# Patient Record
Sex: FEMALE | Race: WHITE | Employment: FULL TIME | URBAN - METROPOLITAN AREA
[De-identification: names, ages, dates, MRNs, and addresses within clinical notes are randomized per-mention and may not be internally consistent; named-entity substitution may affect disease eponyms.]

---

## 2017-01-18 ENCOUNTER — HOSPITAL ENCOUNTER (OUTPATIENT)
Dept: MAMMOGRAPHY | Age: 62
Discharge: HOME | End: 2017-01-18
Attending: NURSE PRACTITIONER
Payer: COMMERCIAL

## 2017-01-18 DIAGNOSIS — Z12.39 BREAST CANCER SCREENING: ICD-10-CM

## 2017-01-18 DIAGNOSIS — N63.10 MASS OF RIGHT BREAST: ICD-10-CM

## 2017-01-18 PROCEDURE — 76642 ULTRASOUND BREAST LIMITED: CPT

## 2017-01-18 PROCEDURE — 77066 DX MAMMO INCL CAD BI: CPT

## 2017-02-07 ENCOUNTER — OFFICE VISIT (OUTPATIENT)
Dept: INTERNAL MEDICINE CLINIC | Age: 62
End: 2017-02-07

## 2017-02-07 VITALS
DIASTOLIC BLOOD PRESSURE: 69 MMHG | OXYGEN SATURATION: 94 % | SYSTOLIC BLOOD PRESSURE: 140 MMHG | TEMPERATURE: 96.1 F | HEIGHT: 60 IN | HEART RATE: 90 BPM | RESPIRATION RATE: 19 BRPM | WEIGHT: 293 LBS | BODY MASS INDEX: 57.52 KG/M2

## 2017-02-07 DIAGNOSIS — J44.9 CHRONIC OBSTRUCTIVE PULMONARY DISEASE, UNSPECIFIED COPD TYPE (HCC): ICD-10-CM

## 2017-02-07 DIAGNOSIS — L71.9 ROSACEA: ICD-10-CM

## 2017-02-07 DIAGNOSIS — E66.01 MORBID OBESITY, UNSPECIFIED OBESITY TYPE (HCC): ICD-10-CM

## 2017-02-07 DIAGNOSIS — F17.200 TOBACCO USE DISORDER: ICD-10-CM

## 2017-02-07 DIAGNOSIS — E78.00 PURE HYPERCHOLESTEROLEMIA: Chronic | ICD-10-CM

## 2017-02-07 DIAGNOSIS — G47.33 OSA (OBSTRUCTIVE SLEEP APNEA): ICD-10-CM

## 2017-02-07 DIAGNOSIS — I10 ESSENTIAL HYPERTENSION: Primary | Chronic | ICD-10-CM

## 2017-02-07 RX ORDER — TRIAMTERENE/HYDROCHLOROTHIAZID 37.5-25 MG
TABLET ORAL
Qty: 30 TAB | Refills: 5 | Status: SHIPPED | OUTPATIENT
Start: 2017-02-07 | End: 2017-09-05 | Stop reason: SDUPTHER

## 2017-02-07 RX ORDER — LOSARTAN POTASSIUM 50 MG/1
TABLET ORAL
Qty: 30 TAB | Refills: 5 | Status: SHIPPED | OUTPATIENT
Start: 2017-02-07 | End: 2017-09-05 | Stop reason: SDUPTHER

## 2017-02-07 RX ORDER — MONTELUKAST SODIUM 10 MG/1
TABLET ORAL
Qty: 30 TAB | Refills: 5 | Status: SHIPPED | OUTPATIENT
Start: 2017-02-07 | End: 2017-09-05 | Stop reason: SDUPTHER

## 2017-02-07 RX ORDER — METRONIDAZOLE 10 MG/G
GEL TOPICAL DAILY
Qty: 45 G | Refills: 5 | Status: SHIPPED | OUTPATIENT
Start: 2017-02-07 | End: 2018-12-05 | Stop reason: SDUPTHER

## 2017-02-07 RX ORDER — LOVASTATIN 40 MG/1
TABLET ORAL
Qty: 30 TAB | Refills: 5 | Status: SHIPPED | OUTPATIENT
Start: 2017-02-07 | End: 2017-09-05 | Stop reason: SDUPTHER

## 2017-02-07 NOTE — PROGRESS NOTES
Reviewed record in preparation for visit and have obtained necessary documentation. Identified pt with two pt identifiers(name and ). Health Maintenance Due   Topic    Hepatitis C Screening     Pneumococcal 19-64 Medium Risk (1 of 1 - PPSV23)    FOBT Q 1 YEAR AGE 54-65     INFLUENZA AGE 9 TO ADULT          Chief Complaint   Patient presents with    Hypertension    Cholesterol Problem    Follow Up Chronic Condition          Learning Assessment:  :     Learning Assessment 2014   PRIMARY LEARNER Patient   HIGHEST LEVEL OF EDUCATION - PRIMARY LEARNER  4 YEARS OF COLLEGE   BARRIERS PRIMARY LEARNER NONE   PRIMARY LANGUAGE ENGLISH   LEARNER PREFERENCE PRIMARY LISTENING   ANSWERED BY patient   RELATIONSHIP SELF       Depression Screening:  :     PHQ 2 / 9, over the last two weeks 2017   Little interest or pleasure in doing things Not at all   Feeling down, depressed or hopeless Not at all   Total Score PHQ 2 0       Fall Risk Assessment:  :     No flowsheet data found. Abuse Screening:  :     No flowsheet data found. Coordination of Care Questionnaire:  :     1) Have you been to an emergency room, urgent care clinic since your last visit? yes   Hospitalized since your last visit? no             2) Have you seen or consulted any other health care providers outside of [address] [place] [address] since your last visit? no  (Include any pap smears or colon screenings in this section.)    3) Do you have an Advance Directive on file? yes    4) Are you interested in receiving information on Advance Directives? NO      Patient is accompanied by patient I have received verbal consent from Juan Diego Coughlin to discuss any/all medical information while they are present in the room.

## 2017-02-07 NOTE — PROGRESS NOTES
HISTORY OF PRESENT ILLNESS  Juan Diego Coughlin is a 63 y.o. female. Pt. comes in after over a year for f/u. Has multiple medical problems. Recent abnormal mammogram but benign findings. Had umbilical hernia repair in 6/16. Has chronic redness on face and neck area. Worse in sun. No itching. Reports compliance with medications but not her diet closely. Med list and most recent labs/studies reviewed with pt. Not very active physically. Has gained more. Plans to attend bariatric surgery seminar. Her asthma/COPD has been stable. Still smoking. Has ADORE but unable to use her CPAP. Sleeps in recliner. Due for labs. Needs refills. Hypertension    Associated symptoms include shortness of breath (DANIEL). Pertinent negatives include no chest pain, no blurred vision, no headaches and no dizziness. Cholesterol Problem   Associated symptoms include shortness of breath (DANIEL). Pertinent negatives include no chest pain, no abdominal pain and no headaches. Follow Up Chronic Condition   Associated symptoms include shortness of breath (DANIEL). Pertinent negatives include no chest pain, no abdominal pain and no headaches. Review of Systems   Constitutional: Negative. Negative for weight loss. Eyes: Negative for blurred vision. Respiratory: Positive for shortness of breath (DANIEL). Negative for sputum production and wheezing. Cardiovascular: Negative for chest pain and leg swelling. Gastrointestinal: Negative for abdominal pain. Genitourinary: Negative for dysuria. Musculoskeletal: Positive for joint pain. Negative for back pain and falls. Skin: Negative for rash. Neurological: Negative for dizziness, sensory change, focal weakness and headaches. Psychiatric/Behavioral: Negative for depression. The patient is not nervous/anxious and does not have insomnia. All other systems reviewed and are negative. Physical Exam   Constitutional: She is oriented to person, place, and time.  She appears well-developed and well-nourished. No distress. Morbidly obese pleasant     HENT:   Head: Normocephalic and atraumatic. Mouth/Throat: Oropharynx is clear and moist.   Thick tissue in back of throat   Large tonsils     Eyes: Conjunctivae are normal. No scleral icterus. Neck: Normal range of motion. Neck supple. No JVD present. No thyromegaly present. Cardiovascular: Normal rate, regular rhythm, normal heart sounds and intact distal pulses. No murmur heard. Pulmonary/Chest: Effort normal and breath sounds normal. No respiratory distress. She has no wheezes. She has no rales. Abdominal: Soft. Bowel sounds are normal. She exhibits no distension. There is no tenderness. obese   Musculoskeletal: She exhibits no edema or tenderness. djd   Lymphadenopathy:     She has no cervical adenopathy. Neurological: She is alert and oriented to person, place, and time. Coordination normal.   Skin: Skin is warm and dry. No rash noted. There is erythema (facial, chronic). Psychiatric: She has a normal mood and affect. Her behavior is normal.   Nursing note and vitals reviewed. ASSESSMENT and PLAN  Brendan Murphy was seen today for hypertension, cholesterol problem and follow up chronic condition. Diagnoses and all orders for this visit:    Essential hypertension  -     METABOLIC PANEL, BASIC    Pure hypercholesterolemia  -     ALT  -     AST  -     LIPID PANEL  -     METABOLIC PANEL, BASIC  -     lovastatin (MEVACOR) 40 mg tablet; TAKE 1 TABLET BY MOUTH NIGHTLY    Morbid obesity, unspecified obesity type (HCC)    ADORE (obstructive sleep apnea)    Tobacco use disorder    Chronic obstructive pulmonary disease, unspecified COPD type (HCC)    Rosacea    Other orders  -     metroNIDAZOLE (METROGEL) 1 % topical gel; Apply  to affected area daily.  Use a thin layer to affected areas after washing  -     montelukast (SINGULAIR) 10 mg tablet; TAKE 1 TABLET BY MOUTH EVERY DAY  -     losartan (COZAAR) 50 mg tablet; TAKE 1 TABLET BY MOUTH DAILY.  -     triamterene-hydroCHLOROthiazide (MAXZIDE) 37.5-25 mg per tablet; TAKE 1 TABLET BY MOUTH EVERY DAY      Follow-up Disposition:  Return in about 6 months (around 8/7/2017). lab results and schedule of future lab studies reviewed with patient  reviewed diet, exercise and weight control  cardiovascular risk and specific lipid/LDL goals reviewed  reviewed medications and side effects in detail   F/u with other MD's as scheduled  Explained that her main issue is her wt. Discussed possible future consequences in detail. Bariatric surgery is probably the only effective way to lose this much wt.

## 2017-02-07 NOTE — MR AVS SNAPSHOT
Visit Information Date & Time Provider Department Dept. Phone Encounter #  
 2/7/2017 10:00 AM Esthela Guerra, [address] [facility] Internal Medicine [phone] [id number] Follow-up Instructions Return in about 6 months (around 8/7/2017). Upcoming Health Maintenance Date Due Hepatitis C Screening 1955 Pneumococcal 19-64 Medium Risk (1 of 1 - PPSV23) 6/22/1976 FOBT Q 1 YEAR AGE 50-75 6/22/2007 INFLUENZA AGE 9 TO ADULT 8/1/2016 PAP AKA CERVICAL CYTOLOGY 12/17/2017 BREAST CANCER SCRN MAMMOGRAM 1/18/2019 DTaP/Tdap/Td series (2 - Td) 12/17/2024 Allergies as of 2/7/2017  Review Complete On: 2/7/2017 By: Esthela Guerra, DO Severity Noted Reaction Type Reactions Codeine High 07/23/2010   Intolerance Shortness of Breath, Swelling Pcn [Penicillins] High 07/23/2010   Intolerance Shortness of Breath, Swelling E-mycin E Medium 07/23/2010   Intolerance Hives, Nausea and Vomiting Scopolamine Medium 12/05/2012    Swelling Adhesive Tape-silicones  74/28/5713    Rash, Swelling Current Immunizations  Reviewed on 2/7/2017 Name Date Influenza Vaccine 10/20/2016, 10/15/2014, 10/21/2013 Influenza Vaccine Split 9/5/2011 Pneumococcal Vaccine (Unspecified Type)  Deferred (Patient Refused) Tdap 12/17/2014 Reviewed by Tiffanie Villafana on 2/7/2017 at 10:22 AM  
You Were Diagnosed With   
  
 Codes Comments Essential hypertension    -  Primary ICD-10-CM: I10 
ICD-9-CM: 401.9 Pure hypercholesterolemia     ICD-10-CM: E78.00 ICD-9-CM: 272.0 Morbid obesity, unspecified obesity type ([facility]     ICD-10-CM: E66.01 
ICD-9-CM: 278.01   
 ADORE (obstructive sleep apnea)     ICD-10-CM: G47.33 
ICD-9-CM: 327.23 Tobacco use disorder     ICD-10-CM: F17.200 ICD-9-CM: 305.1 Chronic obstructive pulmonary disease, unspecified COPD type ([facility]     ICD-10-CM: J44.9 ICD-9-CM: 663 Rosacea     ICD-10-CM: L71.9 ICD-9-CM: 695.3 Vitals BP Pulse Temp Resp Height(growth percentile) Weight(growth percentile) 140/69 (BP 1 Location: Right arm, BP Patient Position: Sitting) 90 96.1 °F (35.6 °C) (Oral) 19 5' (1.524 m) 303 lb (137.4 kg) SpO2 BMI OB Status Smoking Status 94% 59.18 kg/m2 Hysterectomy Current Every Day Smoker BMI and BSA Data Body Mass Index Body Surface Area  
 59.18 kg/m 2 2.41 m 2 Preferred Pharmacy Pharmacy Name Phone [id number] [address] [phone] Your Updated Medication List  
  
   
This list is accurate as of: 2/7/17 10:39 AM.  Always use your most recent med list.  
  
  
  
  
 aspirin 81 mg chewable tablet Take 81 mg by mouth daily. clobetasol 0.05 % topical cream  
Commonly known as:  Marcus Davenport Apply  to affected area two (2) times a day. ipratropium-albuterol  mcg/actuation inhaler Commonly known as:  COMBIVENT Take 2 Puffs by inhalation every six (6) hours as needed for Wheezing. losartan 50 mg tablet Commonly known as:  COZAAR  
TAKE 1 TABLET BY MOUTH DAILY. lovastatin 40 mg tablet Commonly known as:  MEVACOR  
TAKE 1 TABLET BY MOUTH NIGHTLY  
  
 metroNIDAZOLE 1 % topical gel Commonly known as:  Mango Turner Apply  to affected area daily. Use a thin layer to affected areas after washing  
  
 montelukast 10 mg tablet Commonly known as:  SINGULAIR  
TAKE 1 TABLET BY MOUTH EVERY DAY  
  
 triamterene-hydroCHLOROthiazide 37.5-25 mg per tablet Commonly known as:  Cleotis Blare TAKE 1 TABLET BY MOUTH EVERY DAY  
  
 VITAMIN D3 PO Take 1,000 mg by mouth daily. Prescriptions Sent to Pharmacy Refills  
 metroNIDAZOLE (METROGEL) 1 % topical gel 5 Sig: Apply  to affected area daily. Use a thin layer to affected areas after washing Class: Normal  
 Pharmacy: [facility]/pharmacy #[id number] Ph #: [phone] Route: Topical  
 lovastatin (MEVACOR) 40 mg tablet 5 Sig: TAKE 1 TABLET BY MOUTH NIGHTLY Class: Normal  
 [id number] [address] Ph #: [phone]  
 montelukast (SINGULAIR) 10 mg tablet 5 Sig: TAKE 1 TABLET BY MOUTH EVERY DAY Class: Normal  
 [id number] [place] [address] Ph #: [phone]  
 losartan (COZAAR) 50 mg tablet 5 Sig: TAKE 1 TABLET BY MOUTH DAILY. Class: Normal  
 [id number] [address] Ph #: [phone]  
 triamterene-hydroCHLOROthiazide (MAXZIDE) 37.5-25 mg per tablet 5 Sig: TAKE 1 TABLET BY MOUTH EVERY DAY Class: Normal  
 [id number] [address] Ph #: [phone] We Performed the Following ALT [id number] CPT(R)] AST [id number] CPT(R)] LIPID PANEL [14832 CPT(R)] METABOLIC PANEL, BASIC [54475 CPT(R)] Follow-up Instructions Return in about 6 months (around 8/7/2017). Introducing [facility]! Dear Lauren White: Thank you for requesting a [employer] account. Our records indicate that you already have an active [employer] account. You can access your account anytime at [email or web address] Did you know that you can access your hospital and ER discharge instructions at any time in [employer]? You can also review all of your test results from your hospital stay or ER visit. Additional Information If you have questions, please visit the Frequently Asked Questions section of the [employer] website at [email or web address] Remember, [employer] is NOT to be used for urgent needs. For medical emergencies, dial 911. Now available from your iPhone and Android! Please provide this summary of care documentation to your next provider. Your primary care clinician is listed as Juan José Chavez. If you have any questions after today's visit, please call [phone].

## 2017-02-07 NOTE — LETTER
2/15/2017 12:18 PM 
 
Ms. Francine Alonso [facility] [address] Dear Kelvin Cordoba: 
 
Please find your most recent results below. Resulted Orders ALT Result Value Ref Range ALT (SGPT) 21 0 - 32 IU/L Narrative Performed at:  [address]  [id number] : Syed Warner MD, Phone:  [phone] AST Result Value Ref Range AST (SGOT) 20 0 - 40 IU/L Narrative Performed at:  [address]  [id number] : Syed Warner MD, Phone:  [phone] LIPID PANEL Result Value Ref Range Cholesterol, total 134 100 - 199 mg/dL Triglyceride 104 0 - 149 mg/dL HDL Cholesterol 46 >39 mg/dL VLDL, calculated 21 5 - 40 mg/dL LDL, calculated 67 0 - 99 mg/dL Narrative Performed at:  [address]  [id number] : Syed Warner MD, Phone:  [phone] METABOLIC PANEL, BASIC Result Value Ref Range Glucose 95 65 - 99 mg/dL BUN 21 6 - 24 mg/dL Creatinine 0.87 0.57 - 1.00 mg/dL GFR est non-AA 73 >59 mL/min/1.73 GFR est AA 84 >59 mL/min/1.73  
 BUN/Creatinine ratio 24 (H) 9 - 23 Sodium 141 134 - 144 mmol/L Potassium 3.8 3.5 - 5.2 mmol/L Chloride 99 96 - 106 mmol/L  
 CO2 28 18 - 29 mmol/L Calcium 10.0 8.7 - 10.2 mg/dL Narrative Performed at:  [address]  [id number] : Syed Warner MD, Phone:  [phone] CVD REPORT Result Value Ref Range INTERPRETATION Note Comment:  
   Supplement report is available. Narrative Performed at:  [facility] [place] [facility] [address] [place] [address]  [id number] : Feliz Velasco PhD, Phone:  [phone] RECOMMENDATIONS: 
 
stable Please call me if you have any questions: [phone] Sincerely, 
 
 
 Dre Yanez, DO

## 2017-02-08 LAB
ALT SERPL-CCNC: 21 IU/L (ref 0–32)
AST SERPL-CCNC: 20 IU/L (ref 0–40)
BUN SERPL-MCNC: 21 MG/DL (ref 6–24)
BUN/CREAT SERPL: 24 (ref 9–23)
CALCIUM SERPL-MCNC: 10 MG/DL (ref 8.7–10.2)
CHLORIDE SERPL-SCNC: 99 MMOL/L (ref 96–106)
CHOLEST SERPL-MCNC: 134 MG/DL (ref 100–199)
CO2 SERPL-SCNC: 28 MMOL/L (ref 18–29)
CREAT SERPL-MCNC: 0.87 MG/DL (ref 0.57–1)
GLUCOSE SERPL-MCNC: 95 MG/DL (ref 65–99)
HDLC SERPL-MCNC: 46 MG/DL
INTERPRETATION, 910389: NORMAL
LDLC SERPL CALC-MCNC: 67 MG/DL (ref 0–99)
POTASSIUM SERPL-SCNC: 3.8 MMOL/L (ref 3.5–5.2)
SODIUM SERPL-SCNC: 141 MMOL/L (ref 134–144)
TRIGL SERPL-MCNC: 104 MG/DL (ref 0–149)
VLDLC SERPL CALC-MCNC: 21 MG/DL (ref 5–40)

## 2017-04-08 DIAGNOSIS — J45.909 UNCOMPLICATED ASTHMA, UNSPECIFIED ASTHMA SEVERITY: ICD-10-CM

## 2017-04-08 DIAGNOSIS — I10 ESSENTIAL HYPERTENSION: ICD-10-CM

## 2017-04-08 RX ORDER — LOSARTAN POTASSIUM 50 MG/1
TABLET ORAL
Qty: 30 TAB | Refills: 5 | Status: SHIPPED | OUTPATIENT
Start: 2017-04-08 | End: 2017-06-07 | Stop reason: SDUPTHER

## 2017-04-08 RX ORDER — MONTELUKAST SODIUM 10 MG/1
TABLET ORAL
Qty: 30 TAB | Refills: 5 | Status: SHIPPED | OUTPATIENT
Start: 2017-04-08 | End: 2017-06-07 | Stop reason: SDUPTHER

## 2017-04-08 RX ORDER — TRIAMTERENE/HYDROCHLOROTHIAZID 37.5-25 MG
TABLET ORAL
Qty: 30 TAB | Refills: 5 | Status: SHIPPED | OUTPATIENT
Start: 2017-04-08 | End: 2017-06-07 | Stop reason: SDUPTHER

## 2017-06-01 ENCOUNTER — HOSPITAL ENCOUNTER (EMERGENCY)
Age: 62
Discharge: HOME | End: 2017-06-01
Attending: FAMILY MEDICINE

## 2017-06-01 ENCOUNTER — APPOINTMENT (OUTPATIENT)
Dept: GENERAL RADIOLOGY | Age: 62
End: 2017-06-01
Attending: FAMILY MEDICINE

## 2017-06-01 VITALS
HEART RATE: 81 BPM | SYSTOLIC BLOOD PRESSURE: 192 MMHG | DIASTOLIC BLOOD PRESSURE: 90 MMHG | BODY MASS INDEX: 57.52 KG/M2 | TEMPERATURE: 98.1 F | WEIGHT: 293 LBS | OXYGEN SATURATION: 94 % | HEIGHT: 60 IN | RESPIRATION RATE: 20 BRPM

## 2017-06-01 DIAGNOSIS — J44.1 CHRONIC OBSTRUCTIVE PULMONARY DISEASE WITH ACUTE EXACERBATION (HCC): Primary | ICD-10-CM

## 2017-06-01 RX ORDER — BENZONATATE 200 MG/1
200 CAPSULE ORAL
Qty: 21 CAP | Refills: 0 | Status: SHIPPED | OUTPATIENT
Start: 2017-06-01 | End: 2017-06-07

## 2017-06-01 RX ORDER — IPRATROPIUM BROMIDE AND ALBUTEROL SULFATE 2.5; .5 MG/3ML; MG/3ML
3 SOLUTION RESPIRATORY (INHALATION)
Status: COMPLETED | OUTPATIENT
Start: 2017-06-01 | End: 2017-06-01

## 2017-06-01 RX ORDER — PREDNISONE 20 MG/1
TABLET ORAL
Qty: 21 TAB | Refills: 0 | Status: SHIPPED | OUTPATIENT
Start: 2017-06-01 | End: 2017-09-05 | Stop reason: ALTCHOICE

## 2017-06-01 RX ORDER — DOXYCYCLINE 100 MG/1
100 CAPSULE ORAL 2 TIMES DAILY
Qty: 20 CAP | Refills: 0 | Status: SHIPPED | OUTPATIENT
Start: 2017-06-01 | End: 2017-09-05 | Stop reason: ALTCHOICE

## 2017-06-01 RX ORDER — PROMETHAZINE HYDROCHLORIDE AND DEXTROMETHORPHAN HYDROBROMIDE 6.25; 15 MG/5ML; MG/5ML
5 SYRUP ORAL
Qty: 100 ML | Refills: 0 | Status: SHIPPED | OUTPATIENT
Start: 2017-06-01 | End: 2017-06-07

## 2017-06-01 RX ADMIN — IPRATROPIUM BROMIDE AND ALBUTEROL SULFATE 3 ML: 2.5; .5 SOLUTION RESPIRATORY (INHALATION) at 10:55

## 2017-06-01 NOTE — UC PROVIDER NOTE
Patient is a 63 y.o. female presenting with cough. The history is provided by the patient. Cough   This is a new problem. The current episode started more than 2 days ago. The problem occurs every few minutes. The problem has been rapidly worsening. The cough is non-productive. There has been no fever. Associated symptoms include chest pain (on coughing ), shortness of breath and wheezing. Pertinent negatives include no chills, no nausea and no vomiting. She has tried nothing for the symptoms. She is a smoker. Her past medical history is significant for bronchitis, pneumonia and COPD. Past Medical History:   Diagnosis Date    Angina decubitus ([facility]     Arthritis     KNEES    Asthma     COPD     Gallstones     H/O seasonal allergies     History of blood transfusion EARLY     [facility], NO REACTION    Hypercholesterolemia     Hypertension     IBS (irritable bowel syndrome)     Morbid obesity (HCC)     Nausea & vomiting     WITH ALL 3 SURGERIES    Obesity     Other ill-defined conditions(799.89)     High cholesterol    Psoriasis     Recurrent umbilical hernia 4323    Sleep apnea     DOESN'T USE CPAP; SLEEPS IN RECLINER FOR 2 YEARS    Unspecified adverse effect of anesthesia     SMALL AIRWAY, ENLARGED TONSILS    URI (upper respiratory infection) 2014        Past Surgical History:   Procedure Laterality Date    HX  SECTION  1987    HX CHOLECYSTECTOMY  2011    by Dr Bee Folds Right     2010 - Benign    HX DILATION AND CURETTAGE  10/2012    HX HEART CATHETERIZATION      HX HEENT      WISDOM TEETH EXTRACTION    HX HEENT Bilateral     TAN.  TEAR DUCT TUMOR REMOVAL    HX HEENT      ALL TEETH REMOVED    HX HERNIA REPAIR  9997    umbilical hernia repair by Dr Ambrose Aiken  12    ventral hernia repair by Dr Rupa Alexandra  16    Laparoscopic repair of recurrent incisional hernia with mesh by Dr Francheska Man  12/1/2012    Dr Salvador Pro Left 2/09   2 PROCEDURES    ORIF L FOOT, 2ND REPAIR    HX WISDOM TEETH EXTRACTION      KNEE ARTHROSCP HARV      KNEE SCOPE,MED OR LAT MENIS REPAIR Bilateral 1980    TAN 2 SEPARATE SURGERIES SAME YEAR    LAP,CHOLECYSTECTOMY  4/27/11         Family History   Problem Relation Age of Onset    Hypertension Mother     Post-op Nausea/Vomiting Mother     Stroke Mother     Post-op Nausea/Vomiting Daughter     Heart Disease Father     No Known Problems Sister     No Known Problems Sister     No Known Problems Sister         Social History     Social History    Marital status:      Spouse name: N/A    Number of children: N/A    Years of education: N/A     Occupational History    Not on file. Social History Main Topics    Smoking status: Current Every Day Smoker     Packs/day: 0.25     Years: 38.00     Types: Cigarettes    Smokeless tobacco: Never Used      Comment: DOWN TO 5 CIGARETTES PER DAY. SMOKED 2 PPD FOR 7 YEARS    Alcohol use No    Drug use: No    Sexual activity: Not on file     Other Topics Concern    Not on file     Social History Narrative                ALLERGIES: Codeine; Pcn [penicillins]; E-mycin e; Scopolamine; and Adhesive tape-silicones    Review of Systems   Constitutional: Negative for chills. Respiratory: Positive for cough, shortness of breath and wheezing. Cardiovascular: Positive for chest pain (on coughing ). Gastrointestinal: Negative for nausea and vomiting. All other systems reviewed and are negative. Vitals:    06/01/17 1032   BP: 192/90   Pulse: 81   Resp: 20   Temp: 98.1 °F (36.7 °C)   SpO2: 94%   Weight: 136.5 kg (301 lb)   Height: 5' (1.524 m)       Physical Exam   Constitutional: No distress.    HENT:   Right Ear: Tympanic membrane and ear canal normal.   Left Ear: Tympanic membrane and ear canal normal.   Nose: Nose normal.   Mouth/Throat: No oropharyngeal exudate, posterior oropharyngeal edema or posterior oropharyngeal erythema. Eyes: Conjunctivae are normal. Right eye exhibits no discharge. Left eye exhibits no discharge. Neck: Neck supple. Pulmonary/Chest: Effort normal. No respiratory distress. She has decreased breath sounds. She has wheezes. She has rhonchi. She has no rales. Lymphadenopathy:     She has no cervical adenopathy. Skin: No rash noted. Nursing note and vitals reviewed. MDM     Differential Diagnosis; Clinical Impression; Plan:     CLINICAL IMPRESSION:  Chronic obstructive pulmonary disease with acute exacerbation ([facility]  (primary encounter diagnosis)      DDX    Plan:    Doxy/tessalon and prednisone  Continue inhalers    CXR- no infiltrate  Amount and/or Complexity of Data Reviewed:   Tests in the radiology section of CPT®:  Ordered and reviewed   Review and summarize past medical records:  Yes  Risk of Significant Complications, Morbidity, and/or Mortality:   Presenting problems: Moderate  Diagnostic procedures: Moderate  Management options:   Moderate      Procedures

## 2017-06-01 NOTE — DISCHARGE INSTRUCTIONS
COPD Exacerbation Plan: Care Instructions  Your Care Instructions  If you have chronic obstructive pulmonary disease (COPD), your usual shortness of breath could suddenly get worse. You may start coughing more and have more mucus. This flare-up is called a COPD exacerbation (say \"fc-EAU-md-BAY-myriam\"). A lung infection or air pollution could set off an exacerbation. Sometimes it can happen after a quick change in temperature or being around chemicals. Work with your doctor to make a plan for dealing with an exacerbation. You can better manage it if you plan ahead. Follow-up care is a key part of your treatment and safety. Be sure to make and go to all appointments, and call your doctor if you are having problems. It's also a good idea to know your test results and keep a list of the medicines you take. How can you care for yourself at home? During an exacerbation  · Do not panic if you start to have one. Quick treatment at home may help you prevent serious breathing problems. If you have a COPD exacerbation plan that you developed with your doctor, follow it. · Take your medicines exactly as your doctor tells you. ¨ Use your inhaler as directed by your doctor. If your symptoms do not get better after you use your medicine, have someone take you to the emergency room. Call an ambulance if necessary. ¨ With inhaled medicines, a spacer or a nebulizer may help you get more medicine to your lungs. Ask your doctor or pharmacist how to use them properly. Practice using the spacer in front of a mirror before you have an exacerbation. This may help you get the medicine into your lungs quickly. ¨ If your doctor has given you steroid pills, take them as directed. ¨ Your doctor may have given you a prescription for antibiotics, which you can fill if you need it. ¨ Talk to your doctor if you have any problems with your medicine. And call your doctor if you have to use your antibiotic or steroid pills.   Preventing an exacerbation  · Do not smoke. This is the most important step you can take to prevent more damage to your lungs and prevent problems. If you already smoke, it is never too late to stop. If you need help quitting, talk to your doctor about stop-smoking programs and medicines. These can increase your chances of quitting for good. · Take your daily medicines as prescribed. · Avoid colds and flu. ¨ Get a pneumococcal vaccine. ¨ Get a flu vaccine each year, as soon as it is available. Ask those you live or work with to do the same, so they will not get the flu and infect you. ¨ Try to stay away from people with colds or the flu. ¨ Wash your hands often. · Avoid secondhand smoke; air pollution; cold, dry air; hot, humid air; and high altitudes. Stay at home with your windows closed when air pollution is bad. · Learn breathing techniques for COPD, such as breathing through pursed lips. These techniques can help you breathe easier during an exacerbation. When should you call for help? Call 911 anytime you think you may need emergency care. For example, call if:  · You have severe trouble breathing. · You have severe chest pain. Call your doctor now or seek immediate medical care if:  · You have new or worse shortness of breath. · You develop new chest pain. · You are coughing more deeply or more often, especially if you notice more mucus or a change in the color of your mucus. · You cough up blood. · You have new or increased swelling in your legs or belly. · You have a fever. Watch closely for changes in your health, and be sure to contact your doctor if:  · You need to use your antibiotic or steroid pills. · Your symptoms are getting worse. Where can you learn more? Go to http://thien-homero.info/. Enter Z951 in the search box to learn more about \"COPD Exacerbation Plan: Care Instructions. \"  Current as of: July 21, 2016  Content Version: 11.2  © 0749-7263 [employer]. Care instructions adapted under license by [employer] (which disclaims liability or warranty for this information). If you have questions about a medical condition or this instruction, always ask your healthcare professional. [address] any warranty or liability for your use of this information.

## 2017-06-02 ENCOUNTER — PATIENT OUTREACH (OUTPATIENT)
Dept: OTHER | Age: 62
End: 2017-06-02

## 2017-06-02 NOTE — PROGRESS NOTES
Patient on report as with discharge from  visit 06/01 for COPD exacerbation. Initial attempt to contact patient for transitions of care. Left discreet message on voicemail with this CM contact information. Will attempt to contact again. 1:30 pm Ms. Kelly returned my call and left voice message while I was on another call. She shared that she has started her antibiotics and prednisone and is feeling better with productive cough instead of dry. She is planning to make apt with PCP, Dr. Pascale Crowley today. Follow up:  Tues 6/6        Prescriptions   Medication Sig Dispense Start Date End Date Auth. Provider   benzonatate (TESSALON) 200 mg capsule Take 1 Cap by mouth three (3) times daily as needed for Cough for up to 7 days. 21 Cap 6/1/2017 6/8/2017 Delfino Lopez MD   doxycycline (MONODOX) 100 mg capsule Take 1 Cap by mouth two (2) times a day. 20 Cap 6/1/2017  Delfino Lopez MD   predniSONE (DELTASONE) 20 mg tablet 3 tab once  x 3 days, 2 tab once x 3 days, 1 tab once x 3 days and 1/2 tab once  x 3 days - With Breakfast 21 Tab 6/1/2017  Delfino Lopez MD   promethazine-dextromethorphan (PROMETHAZINE-DM) 6.25-15 mg/5 mL syrup Take 5 mL by mouth nightly as needed for Cough.  100 mL 6/1/2017  Delfino Lopez MD   Follow-up Information   Follow up With Details Comments  [address], DO Schedule an appointment as soon as possible for a visit in 1 week If symptom not resolved [address] [facility]   [address] [facility]   [phone]

## 2017-06-05 ENCOUNTER — PATIENT OUTREACH (OUTPATIENT)
Dept: OTHER | Age: 62
End: 2017-06-05

## 2017-06-05 NOTE — PROGRESS NOTES
Patient on report as with discharge from  visit for COPD exacerbation. Attempted to contact patient for transitions of care update. Left discreet message on voicemail with this CM contact information. Will attempt to contact again.      Chart Review finds apt on 6/7 with NP.

## 2017-06-06 ENCOUNTER — PATIENT OUTREACH (OUTPATIENT)
Dept: OTHER | Age: 62
End: 2017-06-06

## 2017-06-06 NOTE — PROGRESS NOTES
SUKUMAR follow up. Patient on with UC/ED visit for COPD exacerbation. Chart review:  PCP appointment made for 06/07. Patient returned my call for transitions of care services. Ms. Salo Ovalle is feeling much better with antibiotics and steroids. She states, \"Smoking is down. \"   Apt for tomorrow with PCP made. She hopes to return to work this weekend. She is aware of her needs for lifestyle modification (smoking/ weight management mentioned) and will consider readiness to change. Introduced CCM program for continued support after SUKUMAR. Will follow for GISELA MONZON services. Next FU 2 weeks.

## 2017-06-07 ENCOUNTER — OFFICE VISIT (OUTPATIENT)
Dept: INTERNAL MEDICINE CLINIC | Age: 62
End: 2017-06-07

## 2017-06-07 VITALS
TEMPERATURE: 97.2 F | WEIGHT: 293 LBS | SYSTOLIC BLOOD PRESSURE: 144 MMHG | HEART RATE: 88 BPM | DIASTOLIC BLOOD PRESSURE: 71 MMHG | HEIGHT: 60 IN | OXYGEN SATURATION: 98 % | BODY MASS INDEX: 57.52 KG/M2 | RESPIRATION RATE: 14 BRPM

## 2017-06-07 DIAGNOSIS — J44.1 COPD EXACERBATION (HCC): Primary | ICD-10-CM

## 2017-06-07 DIAGNOSIS — Z12.11 COLON CANCER SCREENING: ICD-10-CM

## 2017-06-07 NOTE — PATIENT INSTRUCTIONS
COPD Exacerbation Plan: Care Instructions  Your Care Instructions  If you have chronic obstructive pulmonary disease (COPD), your usual shortness of breath could suddenly get worse. You may start coughing more and have more mucus. This flare-up is called a COPD exacerbation (say \"lp-LIR-df-BAY-myriam\"). A lung infection or air pollution could set off an exacerbation. Sometimes it can happen after a quick change in temperature or being around chemicals. Work with your doctor to make a plan for dealing with an exacerbation. You can better manage it if you plan ahead. Follow-up care is a key part of your treatment and safety. Be sure to make and go to all appointments, and call your doctor if you are having problems. It's also a good idea to know your test results and keep a list of the medicines you take. How can you care for yourself at home? During an exacerbation  · Do not panic if you start to have one. Quick treatment at home may help you prevent serious breathing problems. If you have a COPD exacerbation plan that you developed with your doctor, follow it. · Take your medicines exactly as your doctor tells you. ¨ Use your inhaler as directed by your doctor. If your symptoms do not get better after you use your medicine, have someone take you to the emergency room. Call an ambulance if necessary. ¨ With inhaled medicines, a spacer or a nebulizer may help you get more medicine to your lungs. Ask your doctor or pharmacist how to use them properly. Practice using the spacer in front of a mirror before you have an exacerbation. This may help you get the medicine into your lungs quickly. ¨ If your doctor has given you steroid pills, take them as directed. ¨ Your doctor may have given you a prescription for antibiotics, which you can fill if you need it. ¨ Talk to your doctor if you have any problems with your medicine. And call your doctor if you have to use your antibiotic or steroid pills.   Preventing an exacerbation  · Do not smoke. This is the most important step you can take to prevent more damage to your lungs and prevent problems. If you already smoke, it is never too late to stop. If you need help quitting, talk to your doctor about stop-smoking programs and medicines. These can increase your chances of quitting for good. · Take your daily medicines as prescribed. · Avoid colds and flu. ¨ Get a pneumococcal vaccine. ¨ Get a flu vaccine each year, as soon as it is available. Ask those you live or work with to do the same, so they will not get the flu and infect you. ¨ Try to stay away from people with colds or the flu. ¨ Wash your hands often. · Avoid secondhand smoke; air pollution; cold, dry air; hot, humid air; and high altitudes. Stay at home with your windows closed when air pollution is bad. · Learn breathing techniques for COPD, such as breathing through pursed lips. These techniques can help you breathe easier during an exacerbation. When should you call for help? Call 911 anytime you think you may need emergency care. For example, call if:  · You have severe trouble breathing. · You have severe chest pain. Call your doctor now or seek immediate medical care if:  · You have new or worse shortness of breath. · You develop new chest pain. · You are coughing more deeply or more often, especially if you notice more mucus or a change in the color of your mucus. · You cough up blood. · You have new or increased swelling in your legs or belly. · You have a fever. Watch closely for changes in your health, and be sure to contact your doctor if:  · You need to use your antibiotic or steroid pills. · Your symptoms are getting worse. Where can you learn more? Go to http://thien-homero.info/. Enter V739 in the search box to learn more about \"COPD Exacerbation Plan: Care Instructions. \"  Current as of: July 21, 2016  Content Version: 11.2  © 6964-3435 Healthwise, Incorporated. Care instructions adapted under license by [employer] (which disclaims liability or warranty for this information). If you have questions about a medical condition or this instruction, always ask your healthcare professional. [address] any warranty or liability for your use of this information.

## 2017-06-07 NOTE — PROGRESS NOTES
HISTORY OF PRESENT ILLNESS  Britney Baer is a 63 y.o. female. This is a patient of Dr. Laurie Welch who presents today for follow-up after urgent care visit. The patient presented to urgent care on 6/1/17 related to cough and congestion. She had some wheezing as well. She was diagnosed with COPD exacerbation and discharged with orders for doxycycline and prednisone taper. She states she is feeling better. She continue with productive cough, but this is improving. No wheezing or shortness of breath at this time. No chest pain. No fever or chills. Patient states she has been using cough drops PRN, which have helped. She would like to return to work on Friday. Patient has not had colonoscopy. She declines at today's visit but agrees to FIT test.  Visit Vitals    /71 (BP 1 Location: Left arm, BP Patient Position: Sitting)    Pulse 88    Temp 97.2 °F (36.2 °C) (Oral)    Resp 14    Ht 5' (1.524 m)    Wt 303 lb (137.4 kg)    SpO2 98%    BMI 59.18 kg/m2     HPI    Review of Systems   Constitutional: Negative for chills, fever and weight loss. HENT: Positive for congestion and sore throat. Respiratory: Positive for cough and sputum production. Negative for shortness of breath and wheezing. Cardiovascular: Negative for chest pain, palpitations and leg swelling. Gastrointestinal: Negative for abdominal pain, blood in stool, constipation and diarrhea. Genitourinary: Negative. Musculoskeletal: Negative. Skin: Negative. Neurological: Negative for dizziness and headaches. Endo/Heme/Allergies: Negative. Psychiatric/Behavioral: Negative. Physical Exam   Constitutional: She is oriented to person, place, and time. She appears well-developed and well-nourished. No distress. HENT:   Head: Normocephalic and atraumatic. Mild nasal congestion  OP with cobblestoning     Eyes: Conjunctivae are normal.   Cardiovascular: Normal rate and regular rhythm.     Pulmonary/Chest: Effort normal and breath sounds normal. No respiratory distress. She has no wheezes. She has no rales. Musculoskeletal: She exhibits no edema. Lymphadenopathy:     She has no cervical adenopathy. Neurological: She is alert and oriented to person, place, and time. Skin: Skin is warm and dry. Psychiatric: She has a normal mood and affect. Her behavior is normal.   Nursing note and vitals reviewed. ASSESSMENT and PLAN    ICD-10-CM ICD-9-CM    1. COPD exacerbation (HCC) J44.1 491.21 Complete doxycyline 100 mg po bid x 10 days, as ordered. Complete prednisone taper, as ordered. May continue Combivent PRN, as ordered. Patient encouraged to call or return to office if symptoms do not improve or worsen. Discussed importance of smoking cessation. Patient has currently decreased to 3-4 cigarettes daily. 2. Colon cancer screening Z12.11 V76.51 Will order  OCCULT BLOOD, IMMUNOASSAY (FIT)     Completed note for return to work, per request.  Lab results and schedule of future lab studies reviewed with patient  Reviewed diet, exercise and weight control  Very strongly urged to quit smoking to reduce cardiovascular risk  Reviewed medications and side effects in detail  Patient encouraged to call or return to office if symptoms do not improve or worsen. Reviewed plan of care with patient who acknowledges understanding and agrees. If experiencing severe shortness of breath or chest pain, present to the ER. Follow-up with Dr. Rima Parker in 2 months, as scheduled, or sooner as needed.

## 2017-06-07 NOTE — MR AVS SNAPSHOT
Visit Information Date & Time Provider Department Dept. Phone Encounter #  
 6/7/2017  9:45 AM Jacquelyn Dean, [address] [facility] Internal Medicine [phone] [id number] Your Appointments 8/7/2017 10:15 AM  
ACUTE CARE with Nandini Alba DO [facility] ([address]) Appt Note: fu [address] [place] [id number]  
[phone]  
  
   
 [address] [facility] [address] Upcoming Health Maintenance Date Due Hepatitis C Screening 1955 Pneumococcal 19-64 Medium Risk (1 of 1 - PPSV23) 6/22/1976 FOBT Q 1 YEAR AGE 50-75 6/22/2007 INFLUENZA AGE 9 TO ADULT 8/1/2017 PAP AKA CERVICAL CYTOLOGY 12/17/2017 BREAST CANCER SCRN MAMMOGRAM 1/18/2019 DTaP/Tdap/Td series (2 - Td) 12/17/2024 Allergies as of 6/7/2017  Review Complete On: 6/7/2017 By: Jacquelyn Dean NP Severity Noted Reaction Type Reactions Codeine High 07/23/2010   Intolerance Shortness of Breath, Swelling Pcn [Penicillins] High 07/23/2010   Intolerance Shortness of Breath, Swelling E-mycin E Medium 07/23/2010   Intolerance Hives, Nausea and Vomiting Scopolamine Medium 12/05/2012    Swelling Adhesive Tape-silicones  66/11/8958    Rash, Swelling Current Immunizations  Reviewed on 2/7/2017 Name Date Influenza Vaccine 10/20/2016, 10/15/2014, 10/21/2013 Influenza Vaccine Split 9/5/2011 Pneumococcal Vaccine (Unspecified Type)  Deferred (Patient Refused) Tdap 12/17/2014 Not reviewed this visit You Were Diagnosed With   
  
 Codes Comments COPD exacerbation ([facility] [address]    -  Primary ICD-10-CM: J44.1 ICD-9-CM: 491.21 Colon cancer screening     ICD-10-CM: Z12.11 ICD-9-CM: V76.51 Vitals BP Pulse Temp Resp Height(growth percentile) Weight(growth percentile) 144/71 (BP 1 Location: Left arm, BP Patient Position: Sitting) 88 97.2 °F (36.2 °C) (Oral) 14 5' (1.524 m) 303 lb (137.4 kg) SpO2 BMI OB Status Smoking Status 90% 59.18 kg/m2 Hysterectomy Current Every Day Smoker Vitals History BMI and BSA Data Body Mass Index Body Surface Area  
 59.18 kg/m 2 2.41 m 2 Preferred Pharmacy Pharmacy Name Phone [id number] Mckeon, [address] [place] [address] [phone] Your Updated Medication List  
  
   
This list is accurate as of: 6/7/17 10:15 AM.  Always use your most recent med list.  
  
  
  
  
 aspirin 81 mg chewable tablet Take 81 mg by mouth daily. clobetasol 0.05 % topical cream  
Commonly known as:  Jacquetta Laurel Apply  to affected area two (2) times a day. doxycycline 100 mg capsule Commonly known as:  Blanka Less Take 1 Cap by mouth two (2) times a day. ipratropium-albuterol  mcg/actuation inhaler Commonly known as:  COMBIVENT Take 2 Puffs by inhalation every six (6) hours as needed for Wheezing. losartan 50 mg tablet Commonly known as:  COZAAR  
TAKE 1 TABLET BY MOUTH DAILY. lovastatin 40 mg tablet Commonly known as:  MEVACOR  
TAKE 1 TABLET BY MOUTH NIGHTLY  
  
 metroNIDAZOLE 1 % topical gel Commonly known as:  Burtis Sidles Apply  to affected area daily. Use a thin layer to affected areas after washing  
  
 montelukast 10 mg tablet Commonly known as:  SINGULAIR  
TAKE 1 TABLET BY MOUTH EVERY DAY  
  
 predniSONE 20 mg tablet Commonly known as:  DELTASONE  
3 tab once  x 3 days, 2 tab once x 3 days, 1 tab once x 3 days and 1/2 tab once  x 3 days - With Breakfast  
  
 triamterene-hydroCHLOROthiazide 37.5-25 mg per tablet Commonly known as:  Rene Earing TAKE 1 TABLET BY MOUTH EVERY DAY  
  
 VITAMIN D3 PO Take 1,000 mg by mouth daily. We Performed the Following OCCULT BLOOD, IMMUNOASSAY (FIT) [id number] CPT(R)] Patient Instructions COPD Exacerbation Plan: Care Instructions Your Care Instructions If you have chronic obstructive pulmonary disease (COPD), your usual shortness of breath could suddenly get worse. You may start coughing more and have more mucus. This flare-up is called a COPD exacerbation (say \"fa-DMM-am-BAY-shun\"). A lung infection or air pollution could set off an exacerbation. Sometimes it can happen after a quick change in temperature or being around chemicals. Work with your doctor to make a plan for dealing with an exacerbation. You can better manage it if you plan ahead. Follow-up care is a key part of your treatment and safety. Be sure to make and go to all appointments, and call your doctor if you are having problems. It's also a good idea to know your test results and keep a list of the medicines you take. How can you care for yourself at home? During an exacerbation · Do not panic if you start to have one. Quick treatment at home may help you prevent serious breathing problems. If you have a COPD exacerbation plan that you developed with your doctor, follow it. · Take your medicines exactly as your doctor tells you. ¨ Use your inhaler as directed by your doctor. If your symptoms do not get better after you use your medicine, have someone take you to the emergency room. Call an ambulance if necessary. ¨ With inhaled medicines, a spacer or a nebulizer may help you get more medicine to your lungs. Ask your doctor or pharmacist how to use them properly. Practice using the spacer in front of a mirror before you have an exacerbation. This may help you get the medicine into your lungs quickly. ¨ If your doctor has given you steroid pills, take them as directed. ¨ Your doctor may have given you a prescription for antibiotics, which you can fill if you need it. ¨ Talk to your doctor if you have any problems with your medicine. And call your doctor if you have to use your antibiotic or steroid pills. Preventing an exacerbation · Do not smoke. This is the most important step you can take to prevent more damage to your lungs and prevent problems. If you already smoke, it is never too late to stop. If you need help quitting, talk to your doctor about stop-smoking programs and medicines. These can increase your chances of quitting for good. · Take your daily medicines as prescribed. · Avoid colds and flu. ¨ Get a pneumococcal vaccine. ¨ Get a flu vaccine each year, as soon as it is available. Ask those you live or work with to do the same, so they will not get the flu and infect you. ¨ Try to stay away from people with colds or the flu. ¨ Wash your hands often. · Avoid secondhand smoke; air pollution; cold, dry air; hot, humid air; and high altitudes. Stay at home with your windows closed when air pollution is bad. · Learn breathing techniques for COPD, such as breathing through pursed lips. These techniques can help you breathe easier during an exacerbation. When should you call for help? Call 911 anytime you think you may need emergency care. For example, call if: 
· You have severe trouble breathing. · You have severe chest pain. Call your doctor now or seek immediate medical care if: 
· You have new or worse shortness of breath. · You develop new chest pain. · You are coughing more deeply or more often, especially if you notice more mucus or a change in the color of your mucus. · You cough up blood. · You have new or increased swelling in your legs or belly. · You have a fever. Watch closely for changes in your health, and be sure to contact your doctor if: 
· You need to use your antibiotic or steroid pills. · Your symptoms are getting worse. Where can you learn more? Go to http://thien-homero.info/. Enter F901 in the search box to learn more about \"COPD Exacerbation Plan: Care Instructions. \" Current as of: July 21, 2016 Content Version: 11.2 © 1711-6358 Healthwise, Incorporated. Care instructions adapted under license by [employer] (which disclaims liability or warranty for this information). If you have questions about a medical condition or this instruction, always ask your healthcare professional. [address] any warranty or liability for your use of this information. Introducing [facility]! Dear Sawyer Randall: Thank you for requesting a [employer] account. Our records indicate that you already have an active [employer] account. You can access your account anytime at [email or web address] Did you know that you can access your hospital and ER discharge instructions at any time in [employer]? You can also review all of your test results from your hospital stay or ER visit. Additional Information If you have questions, please visit the Frequently Asked Questions section of the [employer] website at [email or web address] Remember, [employer] is NOT to be used for urgent needs. For medical emergencies, dial 911. Now available from your iPhone and Android! Please provide this summary of care documentation to your next provider. Your primary care clinician is listed as Cheryl Narvaez. If you have any questions after today's visit, please call [phone].

## 2017-06-07 NOTE — PROGRESS NOTES
1. Have you been to the ER, urgent care clinic since your last visit? Hospitalized since your last visit? yes- see below. 2. Have you seen or consulted any other health care providers outside of the [address] [place] [address] since your last visit? Include any pap smears or colon screening. No     Chief Complaint   Patient presents with    COPD     follow up from [facility] urgent care 6/1/2017 for COPD exarcebation.       Not fasting

## 2017-06-07 NOTE — LETTER
NOTIFICATION OF RETURN TO WORK / SCHOOL 
 
6/7/2017 10:12 AM 
 
Ms. Escamilla [address] [facility] [address] [address] [place] To Whom It May Concern: 
 
Brian Franklin was under the care of [address] [place] on 6/7/17. She will be able to return to work/school on 06/09/17 If there are questions or concerns please have the patient contact our office. Sincerely, Rosa Bravo NP

## 2017-07-03 ENCOUNTER — PATIENT OUTREACH (OUTPATIENT)
Dept: OTHER | Age: 62
End: 2017-07-03

## 2017-07-03 NOTE — PROGRESS NOTES
FU call for SUKUMAR from UC visit for bronchitis on 6/1. Previous SUKUMAR calls introduced continued CM coverage through [employer] benefits. Possible goals include weight management (BMI 59.18); Smoking cessation; Lifestyle management. Resolving current episode (Transitions of care complete). No further ED/UC or hospital admissions within 30 days post discharge. Patient attended follow-up appointments. Left discreet message on voicemail with this CM contact information. Will attempt to contact again to offer [address] [place] [address] Management services.

## 2017-07-03 NOTE — LETTER
7/3/2017 12:38 PM 
 
Ms. Francine Alonso [facility] [address] Dear Ms. Leticia, My name is Juliann Cade , Employee Care Manager for [employer] [facility] [employer] and I have been trying to reach you. The Employee Care  is a free-of-charge confidential service provided to our employees and their family members covered by the [employer]. The program will provide an employee and his/her family with the [employer] [facility] expertise to assist in navigating the health care delivery system, provider services, and their overall care needsso as to assure and improve health care interactions and enhance the quality of life. This program is designed to provide you with the opportunity to have a [employer] [facility] care manager partner with you for the following services: 
 
1.)  Care transitions-such as when you come home from the hospital  (I hope you recall talking to you) 2.) When help is needed to manage your disease process 3.) When you are faced with managing a chronic or complex medical condition [employer] [facility] is dedicated to empowering the good health of its community and improving the quality of care and care experiences for employees and their families. We are commited to safeguarding patient confidentiality and privacy,  assuring that every employee has the respect he or she deserves in managing their health. The information shared with your care manager will not be shared with anyone else aside from those you identify as part of your care team, and will only be used to assist you with any identified care needs. Please contact me if you would like this service provided to you.   
 
Sincerely, 
Juliann Cade RN, [place] [address], [address] [address]  
Phone:  [phone]     Fax:  [phone]    [email or web address]

## 2017-07-31 ENCOUNTER — PATIENT OUTREACH (OUTPATIENT)
Dept: OTHER | Age: 62
End: 2017-07-31

## 2017-07-31 NOTE — LETTER
7/31/2017 3:38 PM 
 
Ms. [address]am[address] [facility] [address] Dear Ms. Leticia, My name is Trevor Love , Employee Care Manager for [employer] and I have been trying to reach you. I hope you remember speaking with me in early June. The Employee Care  is a free-of-charge confidential service provided to our employees and their family members covered by the [employer]. The program will provide an employee and his/her family with the [employer] expertise to assist in navigating the health care delivery system, provider services, and their overall care needsso as to assure and improve health care interactions and enhance the quality of life. This program is designed to provide you with the opportunity to have a [employer] care manager partner with you for the following services: 
 
1.)  Care transitions-such as when you come home from the hospital 
2.) When help is needed to manage your disease process 3.) When you are faced with managing a chronic or complex medical condition [employer] is dedicated to empowering the good health of its community and improving the quality of care and care experiences for employees and their families. We are commited to safeguarding patient confidentiality and privacy,  assuring that every employee has the respect he or she deserves in managing their health. The information shared with your care manager will not be shared with anyone else aside from those you identify as part of your care team, and will only be used to assist you with any identified care needs. Please contact me if you would like this service provided to you. Sincerely, 
 
 
Trevor Love RN, [facility], [address] [address].  
Phone:  [phone]     Fax:  [phone]    [email or web address]

## 2017-07-31 NOTE — PROGRESS NOTES
Patient identified as eligible for [address] [place] [address] services. Second telephone outreach attempted. Left discreet voicemail with this CM confidential contact information. Will send UTR letter. Ms. Dallinmelanie Silver previously agreed to [facility] services. Opportunities for further CM service with chronic illness and potential lifestyle changes. Noted upcoming OV on 8/7.

## 2017-09-05 ENCOUNTER — OFFICE VISIT (OUTPATIENT)
Dept: INTERNAL MEDICINE CLINIC | Age: 62
End: 2017-09-05

## 2017-09-05 VITALS
HEIGHT: 60 IN | WEIGHT: 293 LBS | SYSTOLIC BLOOD PRESSURE: 145 MMHG | TEMPERATURE: 96.7 F | OXYGEN SATURATION: 97 % | RESPIRATION RATE: 18 BRPM | BODY MASS INDEX: 57.52 KG/M2 | DIASTOLIC BLOOD PRESSURE: 77 MMHG | HEART RATE: 90 BPM

## 2017-09-05 DIAGNOSIS — G47.33 OSA (OBSTRUCTIVE SLEEP APNEA): ICD-10-CM

## 2017-09-05 DIAGNOSIS — E78.00 PURE HYPERCHOLESTEROLEMIA: Chronic | ICD-10-CM

## 2017-09-05 DIAGNOSIS — I10 ESSENTIAL HYPERTENSION: Primary | Chronic | ICD-10-CM

## 2017-09-05 DIAGNOSIS — J45.909 UNCOMPLICATED ASTHMA, UNSPECIFIED ASTHMA SEVERITY: ICD-10-CM

## 2017-09-05 DIAGNOSIS — E66.01 MORBID OBESITY, UNSPECIFIED OBESITY TYPE (HCC): ICD-10-CM

## 2017-09-05 DIAGNOSIS — J44.1 CHRONIC OBSTRUCTIVE PULMONARY DISEASE WITH ACUTE EXACERBATION (HCC): ICD-10-CM

## 2017-09-05 RX ORDER — TRIAMTERENE/HYDROCHLOROTHIAZID 37.5-25 MG
TABLET ORAL
Qty: 30 TAB | Refills: 5 | Status: SHIPPED | OUTPATIENT
Start: 2017-09-05 | End: 2018-01-29 | Stop reason: SDUPTHER

## 2017-09-05 RX ORDER — MONTELUKAST SODIUM 10 MG/1
TABLET ORAL
Qty: 30 TAB | Refills: 5 | Status: SHIPPED | OUTPATIENT
Start: 2017-09-05 | End: 2018-01-29 | Stop reason: SDUPTHER

## 2017-09-05 RX ORDER — LOVASTATIN 40 MG/1
TABLET ORAL
Qty: 30 TAB | Refills: 5 | Status: SHIPPED | OUTPATIENT
Start: 2017-09-05 | End: 2018-01-29 | Stop reason: SDUPTHER

## 2017-09-05 RX ORDER — LOSARTAN POTASSIUM 50 MG/1
TABLET ORAL
Qty: 30 TAB | Refills: 5 | Status: SHIPPED | OUTPATIENT
Start: 2017-09-05 | End: 2018-01-29 | Stop reason: SDUPTHER

## 2017-09-05 NOTE — PROGRESS NOTES
HISTORY OF PRESENT ILLNESS  Omid Guerra is a 63 y.o. female. Pt. comes in for f/u. Has multiple medical problems. She continues to smoke a few cigarettes daily. Denies alcohol use. Reports compliance with medications and diet. Med list and most recent labs/studies reviewed with pt. Labs are stable. Trying to be active physically. Has lost some weight. Needs med refills. Reports no other new c/o. Hypertension    Associated symptoms include shortness of breath (DANIEL). Pertinent negatives include no chest pain, no blurred vision, no headaches and no dizziness. Irritable Bowel Syndrome   Associated symptoms include abdominal pain and shortness of breath (DANIEL). Pertinent negatives include no chest pain and no headaches. Cholesterol Problem   Associated symptoms include abdominal pain and shortness of breath (DANIEL). Pertinent negatives include no chest pain and no headaches. COPD   Associated symptoms include abdominal pain and shortness of breath (DANIEL). Pertinent negatives include no chest pain and no headaches. Review of Systems   Constitutional: Negative. Negative for weight loss. Eyes: Negative for blurred vision. Respiratory: Positive for shortness of breath (DANIEL). Negative for sputum production and wheezing. Cardiovascular: Negative for chest pain and leg swelling. Gastrointestinal: Positive for abdominal pain. Genitourinary: Negative for dysuria. Musculoskeletal: Positive for joint pain. Negative for back pain and falls. Skin: Negative for rash. Neurological: Negative for dizziness, sensory change, focal weakness and headaches. Psychiatric/Behavioral: Negative for depression. The patient is not nervous/anxious and does not have insomnia. All other systems reviewed and are negative. Physical Exam   Constitutional: She is oriented to person, place, and time. She appears well-developed and well-nourished. No distress.    Morbidly obese pleasant     HENT:   Head: Normocephalic and atraumatic. Mouth/Throat: Oropharynx is clear and moist.   Thick tissue in back of throat   Large tonsils     Eyes: Conjunctivae are normal. No scleral icterus. Neck: Normal range of motion. Neck supple. No JVD present. No thyromegaly present. Cardiovascular: Normal rate, regular rhythm, normal heart sounds and intact distal pulses. No murmur heard. Pulmonary/Chest: Effort normal and breath sounds normal. No respiratory distress. She has no wheezes. She has no rales. Abdominal: Soft. Bowel sounds are normal. She exhibits no distension. obese   Musculoskeletal: She exhibits no edema or tenderness. djd   Neurological: She is alert and oriented to person, place, and time. Coordination normal.   Skin: Skin is warm and dry. There is erythema (facial, chronic). Psychiatric: She has a normal mood and affect. Her behavior is normal.   Nursing note and vitals reviewed. ASSESSMENT and PLAN  Diagnoses and all orders for this visit:    1. Essential hypertension  -     METABOLIC PANEL, BASIC; Future    2. Chronic obstructive pulmonary disease with acute exacerbation (HCC)    3. Pure hypercholesterolemia  -     lovastatin (MEVACOR) 40 mg tablet; TAKE 1 TABLET BY MOUTH NIGHTLY  -     LIPID PANEL; Future  -     METABOLIC PANEL, BASIC; Future  -     ALT; Future  -     AST; Future    4. Morbid obesity, unspecified obesity type ([address]    5. ADORE (obstructive sleep apnea)    6. Uncomplicated asthma, unspecified asthma severity    Other orders  -     montelukast (SINGULAIR) 10 mg tablet; TAKE 1 TABLET BY MOUTH EVERY DAY  -     losartan (COZAAR) 50 mg tablet; TAKE 1 TABLET BY MOUTH DAILY. -     triamterene-hydroCHLOROthiazide (MAXZIDE) 37.5-25 mg per tablet; TAKE 1 TABLET BY MOUTH EVERY DAY      Follow-up Disposition:  Return in about 6 months (around 3/5/2018).    lab results and schedule of future lab studies reviewed with patient  reviewed diet, exercise and weight control  reviewed medications and side effects in detail  F/u with other MD's as scheduled  Advised patient to continue losing more weight  Advised patient to use her CPAP on a regular basis

## 2017-09-05 NOTE — PROGRESS NOTES
Health Maintenance Due   Topic Date Due    Hepatitis C Screening  1955    Pneumococcal 19-64 Medium Risk (1 of 1 - PPSV23) 06/22/1976    FOBT Q 1 YEAR AGE 50-75  06/22/2007    ZOSTER VACCINE AGE 60>  04/22/2017    INFLUENZA AGE 9 TO ADULT  08/01/2017    PAP AKA CERVICAL CYTOLOGY  12/17/2017       Chief Complaint   Patient presents with    Hypertension     6 month follow up    Irritable Bowel Syndrome     follow up    Cholesterol Problem     follow up    COPD     follow up       1. Have you been to the ER, urgent care clinic since your last visit? Hospitalized since your last visit? No    2. Have you seen or consulted any other health care providers outside of the [address] [place] [address] since your last visit? Include any pap smears or colon screening. No    3) Do you have an Advance Directive on file? no    4) Are you interested in receiving information on Advance Directives? NO      Patient is accompanied by self I have received verbal consent from Gonzalo Woods to discuss any/all medical information while they are present in the room.

## 2017-09-05 NOTE — MR AVS SNAPSHOT
Visit Information Date & Time Provider Department Dept. Phone Encounter #  
 9/5/2017  9:30 AM Landen Contreras, [facility] Internal Medicine [phone] [id number] Follow-up Instructions Return in about 6 months (around 3/5/2018). Upcoming Health Maintenance Date Due Hepatitis C Screening 1955 Pneumococcal 19-64 Medium Risk (1 of 1 - PPSV23) 6/22/1976 FOBT Q 1 YEAR AGE 50-75 6/22/2007 ZOSTER VACCINE AGE 60> 4/22/2017 INFLUENZA AGE 9 TO ADULT 8/1/2017 PAP AKA CERVICAL CYTOLOGY 12/17/2017 BREAST CANCER SCRN MAMMOGRAM 1/18/2019 DTaP/Tdap/Td series (2 - Td) 12/17/2024 Allergies as of 9/5/2017  Review Complete On: 9/5/2017 By: Landen Contreras DO Severity Noted Reaction Type Reactions Codeine High 07/23/2010   Intolerance Shortness of Breath, Swelling Pcn [Penicillins] High 07/23/2010   Intolerance Shortness of Breath, Swelling E-mycin E Medium 07/23/2010   Intolerance Hives, Nausea and Vomiting Scopolamine Medium 12/05/2012    Swelling Adhesive Tape-silicones  63/31/2176    Rash, Swelling Current Immunizations  Reviewed on 2/7/2017 Name Date Influenza Vaccine 10/20/2016, 10/15/2014, 10/21/2013 Influenza Vaccine Split 9/5/2011 Tdap 12/17/2014 ZZZ-RETIRED (DO NOT USE) Pneumococcal Vaccine (Unspecified Type)  Deferred (Patient Refused) Not reviewed this visit You Were Diagnosed With   
  
 Codes Comments Essential hypertension    -  Primary ICD-10-CM: I10 
ICD-9-CM: 401.9 Chronic obstructive pulmonary disease with acute exacerbation (HCC)     ICD-10-CM: J44.1 ICD-9-CM: 491.21 Pure hypercholesterolemia     ICD-10-CM: E78.00 ICD-9-CM: 272.0 Morbid obesity, unspecified obesity type ([facility]     ICD-10-CM: E66.01 
ICD-9-CM: 278.01   
 ADORE (obstructive sleep apnea)     ICD-10-CM: G47.33 
ICD-9-CM: 327.23 Uncomplicated asthma, unspecified asthma severity     ICD-10-CM: J45.909 ICD-9-CM: 493.90   
  
 Vitals BP Pulse Temp Resp Height(growth percentile) Weight(growth percentile) 145/77 (BP 1 Location: Left arm, BP Patient Position: Sitting) 90 96.7 °F (35.9 °C) (Oral) 18 5' (1.524 m) 296 lb (134.3 kg) SpO2 BMI OB Status Smoking Status 97% 57.81 kg/m2 Hysterectomy Current Every Day Smoker Vitals History BMI and BSA Data Body Mass Index Body Surface Area  
 57.81 kg/m 2 2.38 m 2 Preferred Pharmacy Pharmacy Name Phone [facility]/PHARMACY #[id number] [phone] Your Updated Medication List  
  
   
This list is accurate as of: 9/5/17 10:21 AM.  Always use your most recent med list.  
  
  
  
  
 aspirin 81 mg chewable tablet Take 81 mg by mouth daily. clobetasol 0.05 % topical cream  
Commonly known as:  Wilbur Foil Apply  to affected area two (2) times a day. ipratropium-albuterol  mcg/actuation inhaler Commonly known as:  COMBIVENT Take 2 Puffs by inhalation every six (6) hours as needed for Wheezing. losartan 50 mg tablet Commonly known as:  COZAAR  
TAKE 1 TABLET BY MOUTH DAILY. lovastatin 40 mg tablet Commonly known as:  MEVACOR  
TAKE 1 TABLET BY MOUTH NIGHTLY  
  
 metroNIDAZOLE 1 % topical gel Commonly known as:  Carlene Blazer Apply  to affected area daily. Use a thin layer to affected areas after washing  
  
 montelukast 10 mg tablet Commonly known as:  SINGULAIR  
TAKE 1 TABLET BY MOUTH EVERY DAY  
  
 triamterene-hydroCHLOROthiazide 37.5-25 mg per tablet Commonly known as:  Gary Sieve TAKE 1 TABLET BY MOUTH EVERY DAY  
  
 VITAMIN D3 PO Take 1,000 mg by mouth daily. Prescriptions Sent to Pharmacy Refills  
 lovastatin (MEVACOR) 40 mg tablet 5 Sig: TAKE 1 TABLET BY MOUTH NIGHTLY Class: Normal  
 Pharmacy: [facility]/pharmacy #[id number] Ph #: [phone] montelukast (SINGULAIR) 10 mg tablet 5 Sig: TAKE 1 TABLET BY MOUTH EVERY DAY Class: Normal  
 Pharmacy: [facility]/pharmacy #[id number] Ph #: [phone]  
 losartan (COZAAR) 50 mg tablet 5 Sig: TAKE 1 TABLET BY MOUTH DAILY. Class: Normal  
 Pharmacy: [facility]/pharmacy #[id number] Ph #: [phone]  
 triamterene-hydroCHLOROthiazide (MAXZIDE) 37.5-25 mg per tablet 5 Sig: TAKE 1 TABLET BY MOUTH EVERY DAY Class: Normal  
 Pharmacy: [facility]/pharmacy #[id number] Ph #: [phone] Follow-up Instructions Return in about 6 months (around 3/5/2018). To-Do List   
 03/04/2018 Lab:  ALT   
  
 03/04/2018 Lab:  AST   
  
 03/04/2018 Lab:  LIPID PANEL   
  
 03/04/2018 Lab:  METABOLIC PANEL, BASIC Introducing [facility] & HEALTH SERVICES! Dear Deo Astorga: Thank you for requesting a [employer] account. Our records indicate that you already have an active [employer] account. You can access your account anytime at [email or web address] Did you know that you can access your hospital and ER discharge instructions at any time in Gro[employer]? You can also review all of your test results from your hospital stay or ER visit. Additional Information If you have questions, please visit the Frequently Asked Questions section of the [employer] website at [email or web address] Remember, [employer] is NOT to be used for urgent needs. For medical emergencies, dial 911. Now available from your iPhone and Android! Please provide this summary of care documentation to your next provider. Your primary care clinician is listed as Marion Stephens. If you have any questions after today's visit, please call [phone].

## 2017-09-07 ENCOUNTER — PATIENT OUTREACH (OUTPATIENT)
Dept: OTHER | Age: 62
End: 2017-09-07

## 2017-09-07 NOTE — PROGRESS NOTES
Patient identified as eligible for [address] [place] [address] services. Second telephone outreach attempted. Left discreet voicemail with this CM confidential contact information. UTR letter sent with previous attempt. Chart Review:  OV 09/05  Active cigarette smoker.     Vitals:     /77 (BP 1 Location: Left arm, BP Patient Position: Sitting)     Pulse 90     Temp 96.7 °F (35.9 °C) (Oral)      Resp 18     Ht 5' (1.524 m)     Wt 296 lb (134.3 kg)     SpO2 97%     BMI 57.81 kg/m2     BSA 2.38 m2

## 2017-11-16 ENCOUNTER — PATIENT OUTREACH (OUTPATIENT)
Dept: OTHER | Age: 62
End: 2017-11-16

## 2017-11-16 NOTE — LETTER
11/16/2017 4:05 PM 
 
Ms. [address]am[address] [facility] [address] Dear MsAmy Leticia, My name is Jaz Traylor , Employee Care Manager for [employer] and I have been trying to reach you. The Employee Care  is a free-of-charge confidential service provided to our employees and their family members covered by the [employer]. The program will provide an employee and his/her family with the [employer] expertise to assist in navigating the health care delivery system, provider services, and their overall care needsso as to assure and improve health care interactions and enhance the quality of life. This program is designed to provide you with the opportunity to have a [employer] care manager partner with you for the following services: 
 
1.)  Care transitions-such as when you come home from the hospital 
2.) When help is needed to manage your disease process 3.) When you are faced with managing a chronic or complex medical condition [employer] is dedicated to empowering the good health of its community and improving the quality of care and care experiences for employees and their families. We are commited to safeguarding patient confidentiality and privacy,  assuring that every employee has the respect he or she deserves in managing their health. The information shared with your care manager will not be shared with anyone else aside from those you identify as part of your care team, and will only be used to assist you with any identified care needs. Please contact me if you would like this service provided to you.   
 
Sincerely, 
 
 
ARMANDO Jordan RN, [place] [address], [address] [address].  
Phone:  [phone]     Fax:  [phone]    [email or web address]

## 2018-01-24 ENCOUNTER — HOSPITAL ENCOUNTER (OUTPATIENT)
Dept: LAB | Age: 63
Discharge: HOME | End: 2018-01-24

## 2018-01-24 ENCOUNTER — HOSPITAL ENCOUNTER (EMERGENCY)
Age: 63
Discharge: HOME | End: 2018-01-24
Attending: FAMILY MEDICINE

## 2018-01-24 VITALS
OXYGEN SATURATION: 95 % | BODY MASS INDEX: 57.52 KG/M2 | HEART RATE: 112 BPM | DIASTOLIC BLOOD PRESSURE: 76 MMHG | TEMPERATURE: 98.6 F | HEIGHT: 60 IN | WEIGHT: 293 LBS | RESPIRATION RATE: 20 BRPM | SYSTOLIC BLOOD PRESSURE: 180 MMHG

## 2018-01-24 DIAGNOSIS — L03.116 CELLULITIS OF LEFT LOWER EXTREMITY: Primary | ICD-10-CM

## 2018-01-24 LAB
INFLUENZA A AG (POC): NORMAL
INFLUENZA AG (POC) NEGATIVE CTRL.: NORMAL
INFLUENZA AG (POC) POSITIVE CTRL.: NORMAL
INFLUENZA B AG (POC): NORMAL
LOT NUMBER POC: NORMAL
S PYO AG THROAT QL: NEGATIVE

## 2018-01-24 PROCEDURE — 87070 CULTURE OTHR SPECIMN AEROBIC: CPT | Performed by: NURSE PRACTITIONER

## 2018-01-24 RX ORDER — CLINDAMYCIN HYDROCHLORIDE 300 MG/1
300 CAPSULE ORAL 3 TIMES DAILY
Qty: 21 CAP | Refills: 0 | Status: SHIPPED | OUTPATIENT
Start: 2018-01-24 | End: 2018-01-29 | Stop reason: SDUPTHER

## 2018-01-24 NOTE — UC PROVIDER NOTE
HPI Comments:   Tiredness onset yesterday, chills onset today. Tmax 99.8F has been checking with thermometer. No cough, runny nose, sore throat or worsening SOB. + history of COPD- no recent flare up in symptoms denies sputum production. Symptoms constant. Associated general feelings of malaise/aches. Denies nausea vomiting or diarrhea. No leg swelling. No aggravating or alleviating factors. No known sick contacts. Hx of COPD, ADORE, umbilical hernia    Patient is a 63 y.o. female presenting with chills. Chills    Pertinent negatives include no chest pain, no diarrhea, no vomiting and no neck pain. Past Medical History:   Diagnosis Date    Angina decubitus ([address]     Arthritis     KNEES    Asthma     COPD     Gallstones     H/O seasonal allergies     History of blood transfusion EARLY     [facility], NO REACTION    Hypercholesterolemia     Hypertension     IBS (irritable bowel syndrome)     Morbid obesity (HCC)     Nausea & vomiting     WITH ALL 3 SURGERIES    Obesity     Other ill-defined conditions(799.89)     High cholesterol    Psoriasis     Recurrent umbilical hernia 3/22/5709    Sleep apnea     DOESN'T USE CPAP; SLEEPS IN RECLINER FOR 2 YEARS    Unspecified adverse effect of anesthesia     SMALL AIRWAY, ENLARGED TONSILS    URI (upper respiratory infection) 2014        Past Surgical History:   Procedure Laterality Date    HX  SECTION      HX CHOLECYSTECTOMY  2011    by Dr Mayela Caicedo Right      - Benign    HX DILATION AND CURETTAGE  10/2012    HX HEART CATHETERIZATION      HX HEENT      WISDOM TEETH EXTRACTION    HX HEENT Bilateral     TAN.  TEAR DUCT TUMOR REMOVAL    HX HEENT      ALL TEETH REMOVED    HX HERNIA REPAIR      umbilical hernia repair by Dr Devon Prescott  14    ventral hernia repair by Dr Dexter Arredondo  18    Laparoscopic repair of recurrent incisional hernia with mesh by Dr Jessee Sierra  12/1/2012    Dr Jennings Collet Left 2/09   2 PROCEDURES    ORIF L FOOT, 2ND REPAIR    HX WISDOM TEETH EXTRACTION      KNEE ARTHROSCP HARV      KNEE SCOPE,MED OR LAT MENIS REPAIR Bilateral 1980    TAN 2 SEPARATE SURGERIES SAME YEAR    LAP,CHOLECYSTECTOMY  4/27/11         Family History   Problem Relation Age of Onset    Hypertension Mother     Post-op Nausea/Vomiting Mother     Stroke Mother     Post-op Nausea/Vomiting Daughter     Heart Disease Father     No Known Problems Sister     No Known Problems Sister     No Known Problems Sister         Social History     Social History    Marital status:      Spouse name: N/A    Number of children: N/A    Years of education: N/A     Occupational History    Not on file. Social History Main Topics    Smoking status: Current Every Day Smoker     Packs/day: 0.25     Years: 38.00     Types: Cigarettes    Smokeless tobacco: Never Used      Comment: DOWN TO 5 CIGARETTES PER DAY. SMOKED 2 PPD FOR 7 YEARS    Alcohol use No    Drug use: No    Sexual activity: Not on file     Other Topics Concern    Not on file     Social History Narrative                ALLERGIES: Codeine; Pcn [penicillins]; E-mycin e; Scopolamine; and Adhesive tape-silicones    Review of Systems   Constitutional: Positive for chills. HENT: Negative for rhinorrhea, sinus pain and sinus pressure. Eyes: Negative for visual disturbance. Respiratory: Negative for chest tightness and wheezing. Cardiovascular: Positive for leg swelling. Negative for chest pain and palpitations. Gastrointestinal: Negative for diarrhea, nausea and vomiting. Genitourinary: Negative for decreased urine volume and flank pain. Musculoskeletal: Negative for neck pain. Neurological: Negative for dizziness, syncope and light-headedness.        Vitals:    01/24/18 1536   BP: 180/76   Pulse: (!) 112   Resp: 20   Temp: 98.6 °F (37 °C)   SpO2: 95%   Weight: 137 kg (302 lb)   Height: 5' (1.524 m)       Physical Exam   Constitutional: She is oriented to person, place, and time. Appears to not feel well but doesn't look toxic. HENT:   Head: Normocephalic and atraumatic. Nose: Nose normal.   Mouth/Throat: Oropharynx is clear and moist. No oropharyngeal exudate. TMs normal appearing   Eyes: Conjunctivae and EOM are normal. Pupils are equal, round, and reactive to light. Neck: Normal range of motion. Neck supple. Cardiovascular: Regular rhythm and normal heart sounds. Exam reveals no gallop. No murmur heard. Tachycardia 107 bpm.   Pulmonary/Chest: Effort normal and breath sounds normal. No respiratory distress. She has no wheezes. She has no rales. She exhibits no tenderness. No diminished breath sounds   Abdominal: Soft. Bowel sounds are normal. She exhibits no distension and no mass. There is no tenderness. There is no rebound and no guarding. Musculoskeletal:     Bilateral lower extremity edema L>R with left lower extremity cellulitis; blanchable circumferencial erythema mid shin down to ankle. Not well demarcated . Pain with palpation. Warm to touch. No weeping, no pus or fluctuance or mass. Cap refill < 2 sec. No erythema right lower extremity. Lymphadenopathy:     She has no cervical adenopathy. Neurological: She is alert and oriented to person, place, and time. Skin: Skin is warm and dry. No pallor. Psychiatric: She has a normal mood and affect. Her behavior is normal. Thought content normal.       [facility]     Differential Diagnosis; Clinical Impression; Plan:       CLINICAL IMPRESSION:  (L03.116) Cellulitis of left lower extremity  (primary encounter diagnosis)    Orders Placed This Encounter      CULTURE, THROAT      POC INFLUENZA A & B SCREEN      POC GROUP A STREP  RX      clindamycin (CLEOCIN) 300 mg capsule    Rapid flu and strep negative. Plan:  1.  See above orders  2. Review provided handouts  3. Maintain adequate fluid intake  4. Call immediately after leaving to schedule appointment with PCP for re-eval in 24-48 hours. We have reviewed concerning signs/symptoms, normal vs abnormal progression of medical condition and when and where to seek immediate medical attention   ED immediately for new or worsening. Especially for fever > 100.4 F, nausea, vomiting, worsening swelling or without some improvement over next 24 hours on antibiotic. Amount and/or Complexity of Data Reviewed:   Clinical lab tests:  Ordered   Discuss the patient with another provider:  Yes  Risk of Significant Complications, Morbidity, and/or Mortality:   Presenting problems: Moderate  Diagnostic procedures: Moderate  Management options:   Moderate  Progress:   Patient progress:  Stable      Procedures

## 2018-01-24 NOTE — DISCHARGE INSTRUCTIONS
Cellulitis: Care Instructions  Your Care Instructions    Cellulitis is a skin infection. It often occurs after a break in the skin from a scrape, cut, bite, or puncture, or after a rash. The doctor has checked you carefully, but problems can develop later. If you notice any problems or new symptoms, get medical treatment right away. Follow-up care is a key part of your treatment and safety. Be sure to make and go to all appointments, and call your doctor if you are having problems. It's also a good idea to know your test results and keep a list of the medicines you take. How can you care for yourself at home? · Take your antibiotics as directed. Do not stop taking them just because you feel better. You need to take the full course of antibiotics. · Prop up the infected area on pillows to reduce pain and swelling. Try to keep the area above the level of your heart as often as you can. · If your doctor told you how to care for your wound, follow your doctor's instructions. If you did not get instructions, follow this general advice:  ¨ Wash the wound with clean water 2 times a day. Don't use hydrogen peroxide or alcohol, which can slow healing. ¨ You may cover the wound with a thin layer of petroleum jelly, such as Vaseline, and a nonstick bandage. ¨ Apply more petroleum jelly and replace the bandage as needed. · Be safe with medicines. Take pain medicines exactly as directed. ¨ If the doctor gave you a prescription medicine for pain, take it as prescribed. ¨ If you are not taking a prescription pain medicine, ask your doctor if you can take an over-the-counter medicine. To prevent cellulitis in the future  · Try to prevent cuts, scrapes, or other injuries to your skin. Cellulitis most often occurs where there is a break in the skin. · If you get a scrape, cut, mild burn, or bite, wash the wound with clean water as soon as you can to help avoid infection.  Don't use hydrogen peroxide or alcohol, which can slow healing. · If you have swelling in your legs (edema), support stockings and good skin care may help prevent leg sores and cellulitis. · Take care of your feet, especially if you have diabetes or other conditions that increase the risk of infection. Wear shoes and socks. Do not go barefoot. If you have athlete's foot or other skin problems on your feet, talk to your doctor about how to treat them. When should you call for help? Call your doctor now or seek immediate medical care if:  ? · You have signs that your infection is getting worse, such as:  ¨ Increased pain, swelling, warmth, or redness. ¨ Red streaks leading from the area. ¨ Pus draining from the area. ¨ A fever. ? · You get a rash. ? Watch closely for changes in your health, and be sure to contact your doctor if:  ? · You are not getting better after 1 day (24 hours). ? · You do not get better as expected. Where can you learn more? Go to http://thien-homero.info/. Eula Look in the search box to learn more about \"Cellulitis: Care Instructions. \"  Current as of: October 13, 2016  Content Version: 11.4  © 2175-5005 [employer]. Care instructions adapted under license by [employer] (which disclaims liability or warranty for this information). If you have questions about a medical condition or this instruction, always ask your healthcare professional. [place] [address] any warranty or liability for your use of this information.

## 2018-01-25 ENCOUNTER — PATIENT OUTREACH (OUTPATIENT)
Dept: OTHER | Age: 63
End: 2018-01-25

## 2018-01-25 ENCOUNTER — HOSPITAL ENCOUNTER (EMERGENCY)
Age: 63
Discharge: HOME | End: 2018-01-25
Attending: EMERGENCY MEDICINE
Payer: COMMERCIAL

## 2018-01-25 VITALS
WEIGHT: 293 LBS | HEIGHT: 60 IN | BODY MASS INDEX: 57.52 KG/M2 | DIASTOLIC BLOOD PRESSURE: 79 MMHG | SYSTOLIC BLOOD PRESSURE: 127 MMHG | HEART RATE: 85 BPM | OXYGEN SATURATION: 99 % | RESPIRATION RATE: 16 BRPM | TEMPERATURE: 98.8 F

## 2018-01-25 DIAGNOSIS — L03.116 CELLULITIS OF LEFT LOWER EXTREMITY: Primary | ICD-10-CM

## 2018-01-25 LAB
ALBUMIN SERPL-MCNC: 3.3 G/DL (ref 3.5–5)
ALBUMIN/GLOB SERPL: 0.8 {RATIO} (ref 1.1–2.2)
ALP SERPL-CCNC: 117 U/L (ref 45–117)
ALT SERPL-CCNC: 25 U/L (ref 12–78)
ANION GAP SERPL CALC-SCNC: 10 MMOL/L (ref 5–15)
AST SERPL-CCNC: 20 U/L (ref 15–37)
BASOPHILS # BLD: 0 K/UL (ref 0–0.1)
BASOPHILS NFR BLD: 0 % (ref 0–1)
BILIRUB SERPL-MCNC: 0.9 MG/DL (ref 0.2–1)
BUN SERPL-MCNC: 17 MG/DL (ref 6–20)
BUN/CREAT SERPL: 20 (ref 12–20)
CALCIUM SERPL-MCNC: 8.9 MG/DL (ref 8.5–10.1)
CHLORIDE SERPL-SCNC: 102 MMOL/L (ref 97–108)
CO2 SERPL-SCNC: 27 MMOL/L (ref 21–32)
CREAT SERPL-MCNC: 0.84 MG/DL (ref 0.55–1.02)
DIFFERENTIAL METHOD BLD: ABNORMAL
EOSINOPHIL # BLD: 0 K/UL (ref 0–0.4)
EOSINOPHIL NFR BLD: 0 % (ref 0–7)
ERYTHROCYTE [DISTWIDTH] IN BLOOD BY AUTOMATED COUNT: 14.6 % (ref 11.5–14.5)
GLOBULIN SER CALC-MCNC: 4.3 G/DL (ref 2–4)
GLUCOSE SERPL-MCNC: 94 MG/DL (ref 65–100)
HCT VFR BLD AUTO: 43.9 % (ref 35–47)
HGB BLD-MCNC: 14.5 G/DL (ref 11.5–16)
IMM GRANULOCYTES # BLD: 0 K/UL (ref 0–0.04)
IMM GRANULOCYTES NFR BLD AUTO: 0 % (ref 0–0.5)
LYMPHOCYTES # BLD: 1.6 K/UL (ref 0.8–3.5)
LYMPHOCYTES NFR BLD: 13 % (ref 12–49)
MCH RBC QN AUTO: 29.4 PG (ref 26–34)
MCHC RBC AUTO-ENTMCNC: 33 G/DL (ref 30–36.5)
MCV RBC AUTO: 88.9 FL (ref 80–99)
MONOCYTES # BLD: 1.1 K/UL (ref 0–1)
MONOCYTES NFR BLD: 9 % (ref 5–13)
NEUTS SEG # BLD: 9.6 K/UL (ref 1.8–8)
NEUTS SEG NFR BLD: 77 % (ref 32–75)
NRBC # BLD: 0 K/UL (ref 0–0.01)
NRBC BLD-RTO: 0 PER 100 WBC
PLATELET # BLD AUTO: 220 K/UL (ref 150–400)
PMV BLD AUTO: 10.9 FL (ref 8.9–12.9)
POTASSIUM SERPL-SCNC: 2.9 MMOL/L (ref 3.5–5.1)
PROT SERPL-MCNC: 7.6 G/DL (ref 6.4–8.2)
RBC # BLD AUTO: 4.94 M/UL (ref 3.8–5.2)
SODIUM SERPL-SCNC: 139 MMOL/L (ref 136–145)
WBC # BLD AUTO: 12.4 K/UL (ref 3.6–11)

## 2018-01-25 PROCEDURE — 36415 COLL VENOUS BLD VENIPUNCTURE: CPT | Performed by: PHYSICIAN ASSISTANT

## 2018-01-25 PROCEDURE — 80053 COMPREHEN METABOLIC PANEL: CPT | Performed by: PHYSICIAN ASSISTANT

## 2018-01-25 PROCEDURE — 99282 EMERGENCY DEPT VISIT SF MDM: CPT

## 2018-01-25 PROCEDURE — 85025 COMPLETE CBC W/AUTO DIFF WBC: CPT | Performed by: PHYSICIAN ASSISTANT

## 2018-01-25 NOTE — PROGRESS NOTES
SUKUMAR Call:      Patient on report as with discharge from [address] [place] [address] / ED visit 01/24 for LLE cellulitis. Initial attempt to contact patient for transitions of care. Left discreet message on voicemail with this CM contact information. * Multiple outreach attempts to this patient with no return calls. Will alert practice that their outreach may be of help. - copy chart to NN. Will attempt to contact again. Chart Review:   visit 1/24         Vitals:     01/24/18 1536   BP: 180/76   Pulse: (!) 112   Resp: 20   Temp: 98.6 °F (37 °C)   SpO2: 95%   Weight: 137 kg (302 lb)   Height: 5' (1.524 m)       ED Prescriptions   Medication Sig Dispense Start Date End Date Auth. Provider   clindamycin (CLEOCIN) 300 mg capsule Take 1 Cap by mouth three (3) times daily for 7 days.  21 Cap 1/24/2018 1/31/2018 Benja Staff, NP   Follow-up Information   Follow up With Details Comments Contact Info   Horace Hernandez, DO Schedule an appointment as soon as possible for a visit See PCP within 48 hours for re eval.  For any fever over 100.5F, nausea, vomiting new or worsening or dont feel some improvement after antibiotic for 24-48 hours you MUST go to emergency room immediatly

## 2018-01-25 NOTE — DISCHARGE INSTRUCTIONS
Cellulitis: Care Instructions  Your Care Instructions    Cellulitis is a skin infection. It often occurs after a break in the skin from a scrape, cut, bite, or puncture, or after a rash. The doctor has checked you carefully, but problems can develop later. If you notice any problems or new symptoms, get medical treatment right away. Follow-up care is a key part of your treatment and safety. Be sure to make and go to all appointments, and call your doctor if you are having problems. It's also a good idea to know your test results and keep a list of the medicines you take. How can you care for yourself at home? · Take your antibiotics as directed. Do not stop taking them just because you feel better. You need to take the full course of antibiotics. · Prop up the infected area on pillows to reduce pain and swelling. Try to keep the area above the level of your heart as often as you can. · If your doctor told you how to care for your wound, follow your doctor's instructions. If you did not get instructions, follow this general advice:  ¨ Wash the wound with clean water 2 times a day. Don't use hydrogen peroxide or alcohol, which can slow healing. ¨ You may cover the wound with a thin layer of petroleum jelly, such as Vaseline, and a nonstick bandage. ¨ Apply more petroleum jelly and replace the bandage as needed. · Be safe with medicines. Take pain medicines exactly as directed. ¨ If the doctor gave you a prescription medicine for pain, take it as prescribed. ¨ If you are not taking a prescription pain medicine, ask your doctor if you can take an over-the-counter medicine. To prevent cellulitis in the future  · Try to prevent cuts, scrapes, or other injuries to your skin. Cellulitis most often occurs where there is a break in the skin. · If you get a scrape, cut, mild burn, or bite, wash the wound with clean water as soon as you can to help avoid infection.  Don't use hydrogen peroxide or alcohol, which can slow healing. · If you have swelling in your legs (edema), support stockings and good skin care may help prevent leg sores and cellulitis. · Take care of your feet, especially if you have diabetes or other conditions that increase the risk of infection. Wear shoes and socks. Do not go barefoot. If you have athlete's foot or other skin problems on your feet, talk to your doctor about how to treat them. When should you call for help? Call your doctor now or seek immediate medical care if:  ? · You have signs that your infection is getting worse, such as:  ¨ Increased pain, swelling, warmth, or redness. ¨ Red streaks leading from the area. ¨ Pus draining from the area. ¨ A fever. ? · You get a rash. ? Watch closely for changes in your health, and be sure to contact your doctor if:  ? · You are not getting better after 1 day (24 hours). ? · You do not get better as expected. Where can you learn more? Go to http://thien-homero.info/. Pako Banda in the search box to learn more about \"Cellulitis: Care Instructions. \"  Current as of: October 13, 2016  Content Version: 11.4  © 4016-0618 [employer]. Care instructions adapted under license by [employer] (which disclaims liability or warranty for this information). If you have questions about a medical condition or this instruction, always ask your healthcare professional. [place] [address] any warranty or liability for your use of this information. We hope that we have addressed all of your medical concerns. The examination and treatment you received in the Emergency Department were for an emergent problem and were not intended as complete care. It is important that you follow up with your healthcare provider(s) for ongoing care.  If your symptoms worsen or do not improve as expected, and you are unable to reach your usual health care provider(s), you should return to the Emergency Department. Today's healthcare is undergoing tremendous change, and patient satisfaction surveys are one of the many tools to assess the quality of medical care. You may receive a survey from the [employer] regarding your experience in the Emergency Department. I hope that your experience has been completely positive, particularly the medical care that I provided. As such, please participate in the survey; anything less than excellent does not meet my expectations or intentions. [address] [place] [address] and [facility] participate in nationally recognized quality of care measures. If your blood pressure is greater than 120/80, as reported below, we urge that you seek medical care to address the potential of high blood pressure, commonly known as hypertension. Hypertension can be hereditary or can be caused by certain medical conditions, pain, stress, or \"white coat syndrome. \"       Please make an appointment with your health care provider(s) for follow up of your Emergency Department visit. VITALS:   Patient Vitals for the past 8 hrs:   Temp Pulse Resp BP SpO2   01/25/18 1439 98.1 °F (36.7 °C) (!) 116 16 185/83 96 %          Thank you for allowing us to provide you with medical care today. We realize that you have many choices for your emergency care needs. Please choose us in the future for any continued health care needs.       Yumiko Ashford MD    [facility].   Office: [phone]            Recent Results (from the past 24 hour(s))   METABOLIC PANEL, COMPREHENSIVE    Collection Time: 01/25/18  3:13 PM   Result Value Ref Range    Sodium 139 136 - 145 mmol/L    Potassium 2.9 (L) 3.5 - 5.1 mmol/L    Chloride 102 97 - 108 mmol/L    CO2 27 21 - 32 mmol/L    Anion gap 10 5 - 15 mmol/L    Glucose 94 65 - 100 mg/dL    BUN 17 6 - 20 MG/DL    Creatinine 0.84 0.55 - 1.02 MG/DL    BUN/Creatinine ratio 20 12 - 20 GFR est AA >60 >60 ml/min/1.73m2    GFR est non-AA >60 >60 ml/min/1.73m2    Calcium 8.9 8.5 - 10.1 MG/DL    Bilirubin, total 0.9 0.2 - 1.0 MG/DL    ALT (SGPT) 25 12 - 78 U/L    AST (SGOT) 20 15 - 37 U/L    Alk. phosphatase 117 45 - 117 U/L    Protein, total 7.6 6.4 - 8.2 g/dL    Albumin 3.3 (L) 3.5 - 5.0 g/dL    Globulin 4.3 (H) 2.0 - 4.0 g/dL    A-G Ratio 0.8 (L) 1.1 - 2.2         No results found.

## 2018-01-25 NOTE — ED TRIAGE NOTES
Pt states that she went to the [facility] yesterday for body aches and not feeling good. She tested negative for strep and flu but was told she had cellulitis, was placed on an antibiotic and to keep and eye on it to see if it gets any worse. This morning  The pt states that her foot is redder this morning and moving up her leg.

## 2018-01-25 NOTE — Clinical Note
[place] -  I know this was a UC visit/ but recommended FU with Dr. Malachi Cisneros. She doesn't answer my calls - Head's up for a call from you if you think she will answer. She needs us. .... But may not be open.

## 2018-01-25 NOTE — ED PROVIDER NOTES
HPI Comments: 63 y.o. female with extensive past medical history, please see list, significant for HTN, COPD, morbid obesity and psoriasis who presents ambulatory from home accompanied by her daughter with chief complaint of foot swelling. Pt reports worsening redness and swelling to her left foot extending up her left calf. Pt notes T99.4 and generalized body aches associated. Pt's last dose of ibuprofen was 1230 today. Pt was evaluated for the same yesterday and prescribed PO clindamycin. Pt has taken 3 doses. Pt has also been using antifungal medications and applying Vaseline as directed. Pt states foot was painful yesterday, but pain has improved today. She is concerned about possible infection and wants to ensure she is taking the correct abx. Pt specifically denies history of DM and pain. There are no other acute medical concerns at this time. Social hx: current every day tobacco smoker; denies EtOH use; denies illicit drug use  PCP: Ha Arteaga DO    Note written by London Dang, as dictated by Desiree Daley MD 3:35 PM        The history is provided by the patient. No  was used.         Past Medical History:   Diagnosis Date    Angina decubitus ([facility] [address]     Arthritis     KNEES    Asthma     COPD     Gallstones     H/O seasonal allergies     History of blood transfusion EARLY     [facility], NO REACTION    Hypercholesterolemia     Hypertension     IBS (irritable bowel syndrome)     Morbid obesity (HCC)     Nausea & vomiting     WITH ALL 3 SURGERIES    Obesity     Other ill-defined conditions(799.89)     High cholesterol    Psoriasis     Recurrent umbilical hernia     Sleep apnea     DOESN'T USE CPAP; SLEEPS IN RECLINER FOR 2 YEARS    Unspecified adverse effect of anesthesia     SMALL AIRWAY, ENLARGED TONSILS    URI (upper respiratory infection) 2014       Past Surgical History:   Procedure Laterality Date    HX  SECTION 1987    HX CHOLECYSTECTOMY  4/27/2011    by Dr Basil Thomas Right     2010 - Benign    HX DILATION AND CURETTAGE  10/2012    HX HEART CATHETERIZATION  2011    HX HEENT      WISDOM TEETH EXTRACTION    HX HEENT Bilateral 1980'S    TAN. TEAR DUCT TUMOR REMOVAL    HX HEENT      ALL TEETH REMOVED    HX HERNIA REPAIR  1/51/7186    umbilical hernia repair by Dr Shayna Rico  12/1/12    ventral hernia repair by Dr Severo Legacy  6/9/16    Laparoscopic repair of recurrent incisional hernia with mesh by Dr Kaylen Ovalle  12/1/2012    Dr Kenneth Anderson Left 2/09   2 PROCEDURES    ORIF L FOOT, 2ND REPAIR    HX WISDOM TEETH EXTRACTION      KNEE ARTHROSCP HARV      KNEE SCOPE,MED OR LAT MENIS REPAIR Bilateral 1980    TAN 2 SEPARATE SURGERIES SAME YEAR    LAP,CHOLECYSTECTOMY  4/27/11         Family History:   Problem Relation Age of Onset    Hypertension Mother     Post-op Nausea/Vomiting Mother     Stroke Mother     Post-op Nausea/Vomiting Daughter     Heart Disease Father     No Known Problems Sister     No Known Problems Sister     No Known Problems Sister        Social History     Social History    Marital status:      Spouse name: N/A    Number of children: N/A    Years of education: N/A     Occupational History    Not on file. Social History Main Topics    Smoking status: Current Every Day Smoker     Packs/day: 0.25     Years: 38.00     Types: Cigarettes    Smokeless tobacco: Never Used      Comment: DOWN TO 5 CIGARETTES PER DAY. SMOKED 2 PPD FOR 7 YEARS    Alcohol use No    Drug use: No    Sexual activity: Not on file     Other Topics Concern    Not on file     Social History Narrative         ALLERGIES: Codeine; Pcn [penicillins]; E-mycin e; Scopolamine; and Adhesive tape-silicones    Review of Systems   Constitutional: Negative for fever.    HENT: Negative for facial swelling. Eyes: Negative for visual disturbance. Respiratory: Negative for chest tightness. Cardiovascular: Negative for chest pain. Gastrointestinal: Negative for abdominal pain. Genitourinary: Negative for dysuria. Musculoskeletal: Negative for arthralgias. Left foot swelling, redness   Skin: Negative for rash. Neurological: Negative for dizziness. Hematological: Negative for adenopathy. Psychiatric/Behavioral: Negative for suicidal ideas. Vitals:    01/25/18 1439   BP: 185/83   Pulse: (!) 116   Resp: 16   Temp: 98.1 °F (36.7 °C)   SpO2: 96%   Weight: 137 kg (302 lb)   Height: 5' (1.524 m)            Physical Exam   Constitutional: She is oriented to person, place, and time. She appears well-developed and well-nourished. No distress. Morbidly obese. HENT:   Head: Normocephalic and atraumatic. Mouth/Throat: Oropharynx is clear and moist.   Eyes: Pupils are equal, round, and reactive to light. No scleral icterus. Neck: Normal range of motion. Neck supple. No thyromegaly present. Cardiovascular: Normal rate, regular rhythm, normal heart sounds and intact distal pulses. No murmur heard. Pulmonary/Chest: Effort normal and breath sounds normal. No respiratory distress. Abdominal: Soft. Bowel sounds are normal. She exhibits no distension. There is no tenderness. Musculoskeletal: Normal range of motion. She exhibits no edema. Neurological: She is alert and oriented to person, place, and time. Skin: Skin is warm and dry. No rash noted. She is not diaphoretic. There is erythema. Erythema extending from left ankle into posterior calf. Cracked, dry skin on soles of feet bilaterally. Nursing note and vitals reviewed. Note written by London Kim, as dictated by Carlito Lemus MD 3:35 PM     MDM  Number of Diagnoses or Management Options  Cellulitis of left lower extremity:   Diagnosis management comments: A:  Cellulitis of LLE.   On clindamycin for less than 24 hours. VS stable with slight tachycardia that improved after triage. No fever. WBC=12.4. P:  Pt stable for continued outpatient treatment. Encouraged elevation. Pt advised to return with 24 hours if no further improvement.     ED Course       Procedures

## 2018-01-25 NOTE — LETTER
[address]ank[address] 
[address] [place], [place] [address] [id number] 
[phone] Work/School Note Date: 1/25/2018 To Whom It May concern: 
 
Jc Severino was seen and treated today in the emergency room by the following provider(s): 
Attending Provider: Gaby Lucero MD.   
 
Jc Severino may return to work on 1/29/18.  
 
Sincerely, 
 
 
 
Amber Kapadia RN

## 2018-01-25 NOTE — ED NOTES
Pt ambulatory out of ED with discharge instructions and prescriptions in hand given by Dr. Radha Miller; pt verbalized understanding of discharge paperwork and time allotted for questions. VSS. Pt alert and oriented.

## 2018-01-26 ENCOUNTER — PATIENT OUTREACH (OUTPATIENT)
Dept: OTHER | Age: 63
End: 2018-01-26

## 2018-01-26 LAB
BACTERIA SPEC CULT: NORMAL
SERVICE CMNT-IMP: NORMAL
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
## 2018-01-29 ENCOUNTER — OFFICE VISIT (OUTPATIENT)
Dept: INTERNAL MEDICINE CLINIC | Age: 63
End: 2018-01-29

## 2018-01-29 ENCOUNTER — PATIENT OUTREACH (OUTPATIENT)
Dept: OTHER | Age: 63
End: 2018-01-29

## 2018-01-29 VITALS
SYSTOLIC BLOOD PRESSURE: 145 MMHG | RESPIRATION RATE: 20 BRPM | WEIGHT: 293 LBS | BODY MASS INDEX: 57.52 KG/M2 | HEART RATE: 95 BPM | DIASTOLIC BLOOD PRESSURE: 67 MMHG | HEIGHT: 60 IN | OXYGEN SATURATION: 94 % | TEMPERATURE: 97.9 F

## 2018-01-29 DIAGNOSIS — R60.0 LOCALIZED EDEMA: ICD-10-CM

## 2018-01-29 DIAGNOSIS — E78.00 PURE HYPERCHOLESTEROLEMIA: Chronic | ICD-10-CM

## 2018-01-29 DIAGNOSIS — L03.116 CELLULITIS OF LEFT LOWER EXTREMITY: Primary | ICD-10-CM

## 2018-01-29 DIAGNOSIS — E87.6 HYPOKALEMIA: ICD-10-CM

## 2018-01-29 DIAGNOSIS — L30.9 DERMATITIS: ICD-10-CM

## 2018-01-29 DIAGNOSIS — I10 ESSENTIAL HYPERTENSION: Chronic | ICD-10-CM

## 2018-01-29 RX ORDER — FUROSEMIDE 20 MG/1
20 TABLET ORAL DAILY
Qty: 7 TAB | Refills: 0 | Status: SHIPPED | OUTPATIENT
Start: 2018-01-29 | End: 2018-02-12 | Stop reason: SDUPTHER

## 2018-01-29 RX ORDER — MONTELUKAST SODIUM 10 MG/1
TABLET ORAL
Qty: 30 TAB | Refills: 5 | Status: SHIPPED | OUTPATIENT
Start: 2018-01-29 | End: 2018-10-14 | Stop reason: SDUPTHER

## 2018-01-29 RX ORDER — TRIAMTERENE/HYDROCHLOROTHIAZID 37.5-25 MG
TABLET ORAL
Qty: 30 TAB | Refills: 5 | Status: SHIPPED | OUTPATIENT
Start: 2018-01-29 | End: 2018-09-15 | Stop reason: SDUPTHER

## 2018-01-29 RX ORDER — CLINDAMYCIN HYDROCHLORIDE 300 MG/1
300 CAPSULE ORAL 3 TIMES DAILY
Qty: 21 CAP | Refills: 0 | Status: ON HOLD | OUTPATIENT
Start: 2018-01-29 | End: 2018-02-05

## 2018-01-29 RX ORDER — POTASSIUM CHLORIDE 20 MEQ/1
20 TABLET, EXTENDED RELEASE ORAL DAILY
Qty: 30 TAB | Refills: 0 | Status: SHIPPED | OUTPATIENT
Start: 2018-01-29 | End: 2018-02-24 | Stop reason: SDUPTHER

## 2018-01-29 RX ORDER — LOVASTATIN 40 MG/1
TABLET ORAL
Qty: 30 TAB | Refills: 5 | Status: SHIPPED | OUTPATIENT
Start: 2018-01-29 | End: 2018-10-14 | Stop reason: SDUPTHER

## 2018-01-29 RX ORDER — CLOBETASOL PROPIONATE 0.5 MG/G
CREAM TOPICAL 2 TIMES DAILY
Qty: 1 TUBE | Refills: 5 | Status: SHIPPED | OUTPATIENT
Start: 2018-01-29

## 2018-01-29 RX ORDER — LOSARTAN POTASSIUM 50 MG/1
TABLET ORAL
Qty: 30 TAB | Refills: 5 | Status: SHIPPED | OUTPATIENT
Start: 2018-01-29 | End: 2018-09-15 | Stop reason: SDUPTHER

## 2018-01-29 NOTE — PATIENT INSTRUCTIONS
Cellulitis in Children: Care Instructions  Your Care Instructions    Cellulitis is a skin infection. It often occurs after a break in the skin from a scrape, cut, bite, or puncture. Or it can occur after a rash. The doctor has checked your child carefully. But problems can develop later. If you notice any problems or new symptoms, get medical treatment right away. Follow-up care is a key part of your child's treatment and safety. Be sure to make and go to all appointments, and call your doctor if your child is having problems. It's also a good idea to know your child's test results and keep a list of the medicines your child takes. How can you care for your child at home? · Give your child antibiotics as directed. Do not stop using them just because your child feels better. Your child needs to take the full course of antibiotics. · Prop up the infected area on pillows to reduce pain and swelling. Try to keep the area above the level of your child's heart as often as you can. · If your doctor told you how to care for your child's infection, follow your doctor's instructions. If you did not get instructions, follow this general advice:  ¨ Wash the area with clean water 2 times a day. Don't use hydrogen peroxide or alcohol, which can slow healing. ¨ You may cover the area with a thin layer of petroleum jelly, such as Vaseline, and a nonstick bandage. ¨ Apply more petroleum jelly and replace the bandage as needed. · Give your child acetaminophen (Tylenol) or ibuprofen (Advil, Motrin) to reduce pain and swelling. Read and follow all instructions on the label. · Do not give a child two or more pain medicines at the same time unless the doctor told you to. Many pain medicines have acetaminophen, which is Tylenol. Too much acetaminophen (Tylenol) can be harmful. To prevent cellulitis in the future  · Try to prevent cuts, scrapes, or other injuries to your child's skin.  Cellulitis most often occurs where there is a break in the skin. · If your child gets a scrape, cut, mild burn, or bite, wash the wound with clean water as soon as you can. This helps to avoid infection. Don't use hydrogen peroxide or alcohol, which can slow healing. · Take care of your child's feet, especially if he or she has diabetes or other conditions that increase the risk of infection. Make sure that your child wears shoes and socks. Don't let your child go barefoot. If your child has athlete's foot or other skin problems on the feet, talk to the doctor about how to treat them. When should you call for help? Call your doctor now or seek immediate medical care if:  ? · There are signs that your child's infection is getting worse, such as:  ¨ Increased pain, swelling, warmth, or redness. ¨ Red streaks leading from the area. ¨ Pus draining from the area. ¨ A fever. ? · Your child gets a rash. ? Watch closely for changes in your child's health, and be sure to contact your doctor if:  ? · Your child is not getting better after 1 day (24 hours). ? · Your child does not get better as expected. Where can you learn more? Go to http://thien-homero.info/. Enter C158 in the search box to learn more about \"Cellulitis in Children: Care Instructions. \"  Current as of: October 13, 2016  Content Version: 11.4  © 3080-5542 [employer]. Care instructions adapted under license by [employer] (which disclaims liability or warranty for this information). If you have questions about a medical condition or this instruction, always ask your healthcare professional. [address] any warranty or liability for your use of this information. Electrolyte Imbalance: Care Instructions  Your Care Instructions  Electrolytes are minerals in your blood. They include sodium, potassium, calcium, and magnesium. When they are not at the right levels, you can feel very ill.  You may not know what is causing it, but you know something is wrong. You may feel weak or numb, have muscle spasms, or twitch. Your heart may beat fast. Symptoms are different with each mineral. Too much is as bad as too little. Minerals help keep your body working as it should. Vomiting, diarrhea, and fever can cause you to lose minerals. A problem with your kidneys can tip a mineral out of balance. So can taking certain medicines. Your doctor may do more tests. He or she may change your medicine and diet. If you are low in one or more minerals, they may be given through a tube into your vein (IV). Your doctor may have you take or drink special fluids or pills. If your kidneys are failing, your blood may be filtered. This is called dialysis. It can put the minerals back in balance. Follow-up care is a key part of your treatment and safety. Be sure to make and go to all appointments, and call your doctor if you are having problems. It's also a good idea to know your test results and keep a list of the medicines you take. How can you care for yourself at home? · Take your medicines exactly as prescribed. Call your doctor if you have any problems with your medicines. You will get more details on the specific medicines your doctor prescribes. · Do not take any medicine without talking to your doctor first. This includes prescription, over-the-counter, and herbal medicines. · If you have kidney, heart, or liver disease and have to limit fluids, talk with your doctor before you increase the amount of fluids you drink. · Your doctor or dietitian may give you a diet plan to help balance your minerals. Follow the diet carefully. When should you call for help? Call 911 anytime you think you may need emergency care. For example, call if:  ? · You passed out (lost consciousness). ? · Your heartbeat seems to be irregular. It might be speeding up and then slowing down or skipping beats.    ?Call your doctor now or seek immediate medical care if:  ? · You have muscle aches. ? · You feel very weak. ? · You are confused or cannot think clearly. ? Watch closely for changes in your health, and be sure to contact your doctor if:  ? · You do not get better as expected. Where can you learn more? Go to http://thien-homero.info/. Enter L458 in the search box to learn more about \"Electrolyte Imbalance: Care Instructions. \"  Current as of: May 12, 2017  Content Version: 11.4  © 6282-8485 [employer]. Care instructions adapted under license by [employer] (which disclaims liability or warranty for this information). If you have questions about a medical condition or this instruction, always ask your healthcare professional. [address] any warranty or liability for your use of this information. Leg and Ankle Edema: Care Instructions  Your Care Instructions  Swelling in the legs, ankles, and feet is called edema. It is common after you sit or stand for a while. Long plane flights or car rides often cause swelling in the legs and feet. You may also have swelling if you have to stand for long periods of time at your job. Problems with the veins in the legs (varicose veins) and changes in hormones can also cause swelling. Sometimes the swelling in the ankles and feet is caused by a more serious problem, such as heart failure, infection, blood clots, or liver or kidney disease. Follow-up care is a key part of your treatment and safety. Be sure to make and go to all appointments, and call your doctor if you are having problems. It's also a good idea to know your test results and keep a list of the medicines you take. How can you care for yourself at home? · If your doctor gave you medicine, take it as prescribed. Call your doctor if you think you are having a problem with your medicine. · Whenever you are resting, raise your legs up.  Try to keep the swollen area higher than the level of your heart. · Take breaks from standing or sitting in one position. ¨ Walk around to increase the blood flow in your lower legs. ¨ Move your feet and ankles often while you stand, or tighten and relax your leg muscles. · Wear support stockings. Put them on in the morning, before swelling gets worse. · Eat a balanced diet. Lose weight if you need to. · Limit the amount of salt (sodium) in your diet. Salt holds fluid in the body and may increase swelling. When should you call for help? Call 911 anytime you think you may need emergency care. For example, call if:  ? · You have symptoms of a blood clot in your lung (called a pulmonary embolism). These may include:  ¨ Sudden chest pain. ¨ Trouble breathing. ¨ Coughing up blood. ?Call your doctor now or seek immediate medical care if:  ? · You have signs of a blood clot, such as:  ¨ Pain in your calf, back of the knee, thigh, or groin. ¨ Redness and swelling in your leg or groin. ? · You have symptoms of infection, such as:  ¨ Increased pain, swelling, warmth, or redness. ¨ Red streaks or pus. ¨ A fever. ? Watch closely for changes in your health, and be sure to contact your doctor if:  ? · Your swelling is getting worse. ? · You have new or worsening pain in your legs. ? · You do not get better as expected. Where can you learn more? Go to http://thien-homero.info/. Enter O180 in the search box to learn more about \"Leg and Ankle Edema: Care Instructions. \"  Current as of: March 20, 2017  Content Version: 11.4  © 0252-8092 [employer]. Care instructions adapted under license by [employer] (which disclaims liability or warranty for this information). If you have questions about a medical condition or this instruction, always ask your healthcare professional. [address] any warranty or liability for your use of this information.

## 2018-01-29 NOTE — PROGRESS NOTES
Health Maintenance Due   Topic Date Due    Hepatitis C Screening  1955    Pneumococcal 19-64 Medium Risk (1 of 1 - PPSV23) 06/22/1976    FOBT Q 1 YEAR AGE 50-75  06/22/2007    ZOSTER VACCINE AGE 60>  04/22/2017    Influenza Age 5 to Adult  08/01/2017    PAP AKA CERVICAL CYTOLOGY  12/17/2017       Chief Complaint   Patient presents with    Follow-up     ER- [facility] 1/25/18    Skin Infection     Cellulitis       1. Have you been to the ER, urgent care clinic since your last visit? Hospitalized since your last visit? Yes When: 1/25/19 Where: [facility] Reason for visit: Left leg swelling    2. Have you seen or consulted any other health care providers outside of the [facility] [place] [address] [place] since your last visit? Include any pap smears or colon screening. No    3) Do you have an Advance Directive on file? no    4) Are you interested in receiving information on Advance Directives? NO      Patient is accompanied by self I have received verbal consent from Sofie Leal to discuss any/all medical information while they are present in the room.

## 2018-01-29 NOTE — PROGRESS NOTES
Subjective:     Chief Complaint   Patient presents with    Follow-up     ER- [facility] 1/25/18    Skin Infection     Cellulitis       History of Present Illness    Leela Santos is a 63y.o. year old female who is a patient of Dr. César Rincon that presents today for an ED follow-up visit. She was recently seen at [facility] ED for complaints of redness and swelling to her left lower leg. She was diagnosed with cellulitis and placed on Clindamycin. She states she has noticed some improvement however she recently noticed redness to her right foot as well. She states she has been elevating her legs while at home. She reports increased swelling. Her WBC was normal in the ED. She denies any pain or fevers. Her K was 2.9. She reports compliance with her antibiotics. She denies any new medical complaints today. No CP, SOB, GI, or  symptoms. Reviewed medications, recent lab work and imaging with patient. Pt reports compliance with medications. Current Outpatient Prescriptions on File Prior to Visit   Medication Sig Dispense Refill    clindamycin (CLEOCIN) 300 mg capsule Take 1 Cap by mouth three (3) times daily for 7 days. 21 Cap 0    lovastatin (MEVACOR) 40 mg tablet TAKE 1 TABLET BY MOUTH NIGHTLY 30 Tab 5    montelukast (SINGULAIR) 10 mg tablet TAKE 1 TABLET BY MOUTH EVERY DAY 30 Tab 5    losartan (COZAAR) 50 mg tablet TAKE 1 TABLET BY MOUTH DAILY. 30 Tab 5    triamterene-hydroCHLOROthiazide (MAXZIDE) 37.5-25 mg per tablet TAKE 1 TABLET BY MOUTH EVERY DAY 30 Tab 5    metroNIDAZOLE (METROGEL) 1 % topical gel Apply  to affected area daily. Use a thin layer to affected areas after washing 45 g 5    ipratropium-albuterol (COMBIVENT)  mcg/actuation inhaler Take 2 Puffs by inhalation every six (6) hours as needed for Wheezing. 1 Inhaler 5    clobetasol (TEMOVATE) 0.05 % topical cream Apply  to affected area two (2) times a day. (Patient taking differently: Apply  to affected area two (2) times a day.  FOR PSORASIS) 45 g 0    CHOLECALCIFEROL, VITAMIN D3, (VITAMIN D-3 PO) Take 1,000 mg by mouth daily.  aspirin 81 mg chewable tablet Take 81 mg by mouth daily. No current facility-administered medications on file prior to visit. Allergies   Allergen Reactions    Codeine Shortness of Breath and Swelling    Pcn [Penicillins] Shortness of Breath and Swelling    E-Mycin E Hives and Nausea and Vomiting    Scopolamine Swelling    Adhesive Tape-Silicones Rash and Swelling      Past Medical History:   Diagnosis Date    Angina decubitus (HCC)     Arthritis     KNEES    Asthma     COPD     Gallstones     H/O seasonal allergies     History of blood transfusion EARLY     [facility], NO REACTION    Hypercholesterolemia     Hypertension     IBS (irritable bowel syndrome)     Morbid obesity (HCC)     Nausea & vomiting     WITH ALL 3 SURGERIES    Obesity     Other ill-defined conditions(799.89)     High cholesterol    Psoriasis     Recurrent umbilical hernia     Sleep apnea     DOESN'T USE CPAP; SLEEPS IN RECLINER FOR 2 YEARS    Unspecified adverse effect of anesthesia     SMALL AIRWAY, ENLARGED TONSILS    URI (upper respiratory infection) 2014      Past Surgical History:   Procedure Laterality Date    HX  SECTION      HX CHOLECYSTECTOMY  2011    by Dr Ajit Koch Right      - Benign    HX DILATION AND CURETTAGE  10/2012    HX HEART CATHETERIZATION      HX HEENT      WISDOM TEETH EXTRACTION    HX HEENT Bilateral     TAN.  TEAR DUCT TUMOR REMOVAL    HX HEENT      ALL TEETH REMOVED    HX HERNIA REPAIR      umbilical hernia repair by Dr Governor Chapman  12    ventral hernia repair by Dr Ruy Camarena  18    Laparoscopic repair of recurrent incisional hernia with mesh by Dr Devante Healy  2012    Dr Donnie Gambino HX ORTHOPAEDIC Left 2/09   2 PROCEDURES    ORIF L FOOT, 2ND REPAIR    HX WISDOM TEETH EXTRACTION      KNEE ARTHROSCP HARV      KNEE SCOPE,MED OR LAT MENIS REPAIR Bilateral 1980    TAN 2 SEPARATE SURGERIES SAME YEAR    LAP,CHOLECYSTECTOMY  4/27/11      Social History   Substance Use Topics    Smoking status: Current Every Day Smoker     Packs/day: 0.25     Years: 38.00     Types: Cigarettes    Smokeless tobacco: Never Used      Comment: DOWN TO 5 CIGARETTES PER DAY. SMOKED 2 PPD FOR 7 YEARS    Alcohol use No      Family History   Problem Relation Age of Onset    Hypertension Mother     Post-op Nausea/Vomiting Mother     Stroke Mother     Post-op Nausea/Vomiting Daughter     Heart Disease Father     No Known Problems Sister     No Known Problems Sister     No Known Problems Sister         Objective:     Vitals:    01/29/18 1337   BP: 145/67   Pulse: 95   Resp: 20   Temp: 97.9 °F (36.6 °C)   TempSrc: Oral   SpO2: 94%   Weight: 295 lb 9.6 oz (134.1 kg)   Height: 5' (1.524 m)       Review of Systems   Constitutional: Negative. HENT: Negative. Eyes: Negative. Respiratory: Negative. Cardiovascular: Positive for leg swelling. Gastrointestinal: Negative. Genitourinary: Negative. Musculoskeletal: Negative. Skin:        Redness   Neurological: Negative. Psychiatric/Behavioral: Negative. Physical Exam   Constitutional: She is oriented to person, place, and time. She appears well-developed and well-nourished. No distress. Well-appearing obese  female. NAD   HENT:   Head: Normocephalic and atraumatic. Eyes: Conjunctivae and EOM are normal. Pupils are equal, round, and reactive to light. Neck: Normal range of motion. Neck supple. Cardiovascular: Normal rate, regular rhythm and normal heart sounds. Pulmonary/Chest: Effort normal and breath sounds normal. No respiratory distress. She has no wheezes. Abdominal: She exhibits no distension.    Musculoskeletal: Normal range of motion. She exhibits edema. She exhibits no tenderness or deformity. Feet:   Right Foot:   Skin Integrity: Positive for callus and dry skin. Left Foot:   Skin Integrity: Positive for callus and dry skin. Neurological: She is alert and oriented to person, place, and time. Skin: Skin is warm and dry. She is not diaphoretic. There is erythema. Left lower leg with erythema and edema from ankle to knee. 3+ edema noted  Small erythematous area noted on top of right foot. Psychiatric: She has a normal mood and affect. Her behavior is normal.   Nursing note and vitals reviewed. Assessment/ Plan:   Diagnoses and all orders for this visit:    1. Hypokalemia   Will order  -     potassium chloride (K-DUR, KLOR-CON) 20 mEq tablet; Take 1 Tab by mouth daily.  -     METABOLIC PANEL, COMPREHENSIVE    2. Cellulitis of left lower extremity   Will order  -     clindamycin (CLEOCIN) 300 mg capsule; Take 1 Cap by mouth three (3) times daily for 7 days. -     CBC WITH AUTOMATED DIFF  -     REFERRAL TO PODIATRY    3. Localized edema   Will order  -     furosemide (LASIX) 20 mg tablet; Take 1 Tab by mouth daily. 4. Pure hypercholesterolemia   Will order  -     lovastatin (MEVACOR) 40 mg tablet; TAKE 1 TABLET BY MOUTH NIGHTLY    5. Dermatitis   Will order  -     clobetasol (TEMOVATE) 0.05 % topical cream; Apply  to affected area two (2) times a day. FOR PSORASIS    Other orders  -     montelukast (SINGULAIR) 10 mg tablet; TAKE 1 TABLET BY MOUTH EVERY DAY  -     losartan (COZAAR) 50 mg tablet; TAKE 1 TABLET BY MOUTH DAILY. -     triamterene-hydroCHLOROthiazide (MAXZIDE) 37.5-25 mg per tablet; TAKE 1 TABLET BY MOUTH EVERY DAY    Patient's plan of care has been reviewed with them.   Patient and/or family have verbally conveyed their understanding and agreement of the patient's signs, symptoms, diagnosis, treatment and prognosis and additionally agree to follow up as recommended or return to [facility] should their condition change prior to follow-up. Discharge instructions have also been provided to the patient with some educational information regarding their diagnosis as well a list of reasons why they would want to return to the office prior to their follow-up appointment should their condition change. Follow-up on Thursday unless cellulitis has greatly improved.

## 2018-01-29 NOTE — MR AVS SNAPSHOT
[address] [facility] [address] Alyssa Thomas 13 
[phone] Patient: Sterling Quinones MRN:  [id number] Visit Information Date & Time Provider Department Dept. Phone Encounter #  
 1/29/2018  1:20 PM Serenity Singh, [facility] Internal Medicine [phone] [id number] Your Appointments 3/6/2018 10:00 AM  
ROUTINE CARE with Larry Huff DO [facility] Internal Medicine ([address] [facility]) Appt Note: 6 month follow up [address] [facility] [address] [facility] [address]  
[phone]  
  
   
 [address] Upcoming Health Maintenance Date Due Hepatitis C Screening 1955 Pneumococcal 19-64 Medium Risk (1 of 1 - PPSV23) 6/22/1976 FOBT Q 1 YEAR AGE 50-75 6/22/2007 ZOSTER VACCINE AGE 60> 4/22/2017 PAP AKA CERVICAL CYTOLOGY 12/17/2017 BREAST CANCER SCRN MAMMOGRAM 1/18/2019 DTaP/Tdap/Td series (2 - Td) 12/17/2024 Allergies as of 1/29/2018  Review Complete On: 1/29/2018 By: Pallavi Lewis LPN Severity Noted Reaction Type Reactions Codeine High 07/23/2010   Intolerance Shortness of Breath, Swelling Pcn [Penicillins] High 07/23/2010   Intolerance Shortness of Breath, Swelling E-mycin E Medium 07/23/2010   Intolerance Hives, Nausea and Vomiting Scopolamine Medium 12/05/2012    Swelling Adhesive Tape-silicones  03/17/9367    Rash, Swelling Current Immunizations  Reviewed on 2/7/2017 Name Date Influenza Vaccine 10/16/2017, 10/15/2014, 10/21/2013 Influenza Vaccine Split 9/5/2011 Tdap 12/17/2014 ZZZ-RETIRED (DO NOT USE) Pneumococcal Vaccine (Unspecified Type)  Deferred (Patient Refused) Not reviewed this visit You Were Diagnosed With   
  
 Codes Comments Hypokalemia    -  Primary ICD-10-CM: E87.6 ICD-9-CM: 276.8 Cellulitis of left lower extremity     ICD-10-CM: L03.116 ICD-9-CM: 682.6 Localized edema     ICD-10-CM: R60.0 ICD-9-CM: 389. 3 Pure hypercholesterolemia     ICD-10-CM: E78.00 ICD-9-CM: 272.0 Dermatitis     ICD-10-CM: L30.9 ICD-9-CM: 692.9 Vitals BP Pulse Temp Resp Height(growth percentile) Weight(growth percentile) 145/67 (BP 1 Location: Left arm, BP Patient Position: Sitting) 95 97.9 °F (36.6 °C) (Oral) 20 5' (1.524 m) 295 lb 9.6 oz (134.1 kg) SpO2 BMI OB Status Smoking Status 94% 57.73 kg/m2 Hysterectomy Current Every Day Smoker Vitals History BMI and BSA Data Body Mass Index Body Surface Area 57.73 kg/m 2 2.38 m 2 Preferred Pharmacy Pharmacy Name Phone [id number] [address] [phone] Your Updated Medication List  
  
   
This list is accurate as of: 1/29/18  2:09 PM.  Always use your most recent med list.  
  
  
  
  
 aspirin 81 mg chewable tablet Take 81 mg by mouth daily. clindamycin 300 mg capsule Commonly known as:  CLEOCIN Take 1 Cap by mouth three (3) times daily for 7 days. clobetasol 0.05 % topical cream  
Commonly known as:  Royal Riches Apply  to affected area two (2) times a day. FOR PSORASIS  
  
 furosemide 20 mg tablet Commonly known as:  LASIX Take 1 Tab by mouth daily. ipratropium-albuterol  mcg/actuation inhaler Commonly known as:  COMBIVENT Take 2 Puffs by inhalation every six (6) hours as needed for Wheezing. losartan 50 mg tablet Commonly known as:  COZAAR  
TAKE 1 TABLET BY MOUTH DAILY. lovastatin 40 mg tablet Commonly known as:  MEVACOR  
TAKE 1 TABLET BY MOUTH NIGHTLY  
  
 metroNIDAZOLE 1 % topical gel Commonly known as:  Eulis Miu Apply  to affected area daily. Use a thin layer to affected areas after washing  
  
 montelukast 10 mg tablet Commonly known as:  SINGULAIR  
TAKE 1 TABLET BY MOUTH EVERY DAY  
  
 potassium chloride 20 mEq tablet Commonly known as:  K-DUR, KLOR-CON Take 1 Tab by mouth daily. triamterene-hydroCHLOROthiazide 37.5-25 mg per tablet Commonly known as:  Cande Evens TAKE 1 TABLET BY MOUTH EVERY DAY  
  
 VITAMIN D3 PO Take 1,000 mg by mouth daily. Prescriptions Sent to Pharmacy Refills  
 clindamycin (CLEOCIN) 300 mg capsule 0 Sig: Take 1 Cap by mouth three (3) times daily for 7 days. Class: Normal  
 Pharmacy: [facility] #[id number] Ph #: [phone] Route: Oral  
 furosemide (LASIX) 20 mg tablet 0 Sig: Take 1 Tab by mouth daily. Class: Normal  
 Pharmacy: [facility]/pharmacy #[id number] Ph #: [phone] Route: Oral  
 potassium chloride (K-DUR, KLOR-CON) 20 mEq tablet 0 Sig: Take 1 Tab by mouth daily. Class: Normal  
 [facility] Ph #: [phone] Route: Oral  
 lovastatin (MEVACOR) 40 mg tablet 5 Sig: TAKE 1 TABLET BY MOUTH NIGHTLY Class: Normal  
 Pharmacy: [facility]/pharmacy #[id number] Ph #: [phone]  
 montelukast (SINGULAIR) 10 mg tablet 5 Sig: TAKE 1 TABLET BY MOUTH EVERY DAY Class: Normal  
 [id number] [address] Ph #: [phone]  
 losartan (COZAAR) 50 mg tablet 5 Sig: TAKE 1 TABLET BY MOUTH DAILY. Class: Normal  
 Pharmacy: [facility]/pharmacy #[id number] Ph #: [phone]  
 triamterene-hydroCHLOROthiazide (MAXZIDE) 37.5-25 mg per tablet 5 Sig: TAKE 1 TABLET BY MOUTH EVERY DAY  Class: Normal  
 Pharmacy: [facility]/pharmacy #[id number] Ph #: [phone]  
 clobetasol (TEMOVATE) 0.05 % topical cream 5  
 Sig: Apply  to affected area two (2) times a day. FOR PSORASIS Class: Normal  
 [id number] [place], [address] [facility] Ph #: [phone] Route: Topical  
  
We Performed the Following CBC WITH AUTOMATED DIFF [43689 CPT(R)] METABOLIC PANEL, COMPREHENSIVE [91623 CPT(R)] REFERRAL TO PODIATRY [REF90 Custom] Comments:  
 Please evaluate patient for foot evaluation Referral Information Referral ID Referred By Referred To  
  
 [id number] DOMO, [address] [address]   
   [address] [place], [facility] Visits Status Start Date End Date 1 New Request 1/29/18 1/29/19 If your referral has a status of pending review or denied, additional information will be sent to support the outcome of this decision. Introducing [facility]! Dear Abby House: Thank you for requesting a [employer] account. Our records indicate that you already have an active [employer] account. You can access your account anytime at [email or web address] Did you know that you can access your hospital and ER discharge instructions at any time in [employer]? You can also review all of your test results from your hospital stay or ER visit. Additional Information If you have questions, please visit the Frequently Asked Questions section of the [employer] website at [email or web address] Remember, [employer] is NOT to be used for urgent needs. For medical emergencies, dial 911. Now available from your iPhone and Android! Please provide this summary of care documentation to your next provider. Your primary care clinician is listed as Rhoda Herrera. If you have any questions after today's visit, please call [phone].

## 2018-01-29 NOTE — PROGRESS NOTES
SUKUMAR follow up. Patient with UC  1/24  And ED visit 1/25 for Left leg cellulitis . Chart review:  PCP / NP visit today attended. With FU apt Thurs planned/ unless healing well. Attempted to contact patient for transitions of care services. Left discreet message on voicemail with this CM contact information. Will follow for [facility] services. Next call   2/1    Chart Review    Vitals:     01/29/18 1337   BP: 145/67   Pulse: 95   Resp: 20   Temp: 97.9 °F (36.6 °C)   TempSrc: Oral   SpO2: 94%   Weight: 295 lb 9.6 oz (134.1 kg)   Height: 5' (1.524 m)     Assessment/ Plan:   Diagnoses and all orders for this visit:     1. Hypokalemia                        Will order  -     potassium chloride (K-DUR, KLOR-CON) 20 mEq tablet; Take 1 Tab by mouth daily.  -     METABOLIC PANEL, COMPREHENSIVE     2. Cellulitis of left lower extremity                        Will order  -     clindamycin (CLEOCIN) 300 mg capsule; Take 1 Cap by mouth three (3) times daily for 7 days. -     CBC WITH AUTOMATED DIFF  -     REFERRAL TO PODIATRY     3. Localized edema                        Will order  -     furosemide (LASIX) 20 mg tablet; Take 1 Tab by mouth daily.     4. Pure hypercholesterolemia                        Will order  -     lovastatin (MEVACOR) 40 mg tablet; TAKE 1 TABLET BY MOUTH NIGHTLY     5. Dermatitis                        Will order  -     clobetasol (TEMOVATE) 0.05 % topical cream; Apply  to affected area two (2) times a day. FOR PSORASIS     Other orders  -     montelukast (SINGULAIR) 10 mg tablet; TAKE 1 TABLET BY MOUTH EVERY DAY  -     losartan (COZAAR) 50 mg tablet; TAKE 1 TABLET BY MOUTH DAILY.   -     triamterene-hydroCHLOROthiazide (MAXZIDE) 37.5-25 mg per tablet; TAKE 1 TABLET BY MOUTH EVERY DAY

## 2018-01-29 NOTE — LETTER
1/30/2018 12:58 PM 
 
Ms. Francine Alonso [facility] [address] Dear Nilda España: 
 
Please find your most recent results below. Resulted Orders CBC WITH AUTOMATED DIFF Result Value Ref Range WBC 12.4 (H) 3.4 - 10.8 x10E3/uL  
 RBC 4.85 3.77 - 5.28 x10E6/uL HGB 14.2 11.1 - 15.9 g/dL HCT 42.8 34.0 - 46.6 % MCV 88 79 - 97 fL  
 MCH 29.3 26.6 - 33.0 pg  
 MCHC 33.2 31.5 - 35.7 g/dL  
 RDW 14.7 12.3 - 15.4 % PLATELET 997 500 - 702 x10E3/uL NEUTROPHILS 64 Not Estab. % Lymphocytes 20 Not Estab. % MONOCYTES 13 Not Estab. % EOSINOPHILS 3 Not Estab. % BASOPHILS 0 Not Estab. %  
 ABS. NEUTROPHILS 7.9 (H) 1.4 - 7.0 x10E3/uL Abs Lymphocytes 2.5 0.7 - 3.1 x10E3/uL  
 ABS. MONOCYTES 1.6 (H) 0.1 - 0.9 x10E3/uL  
 ABS. EOSINOPHILS 0.3 0.0 - 0.4 x10E3/uL  
 ABS. BASOPHILS 0.0 0.0 - 0.2 x10E3/uL IMMATURE GRANULOCYTES 0 Not Estab. %  
 ABS. IMM. GRANS. 0.0 0.0 - 0.1 x10E3/uL Narrative Performed at:  [address]  [id number] : Cordell Robles MD, Phone:  [phone] METABOLIC PANEL, COMPREHENSIVE Result Value Ref Range Glucose 90 65 - 99 mg/dL BUN 18 8 - 27 mg/dL Creatinine 0.79 0.57 - 1.00 mg/dL GFR est non-AA 82 >59 mL/min/1.73 GFR est AA 94 >59 mL/min/1.73  
 BUN/Creatinine ratio 23 12 - 28 Sodium 140 134 - 144 mmol/L Potassium 3.9 3.5 - 5.2 mmol/L Chloride 98 96 - 106 mmol/L  
 CO2 25 18 - 29 mmol/L Calcium 9.8 8.7 - 10.3 mg/dL Protein, total 6.9 6.0 - 8.5 g/dL Albumin 4.0 3.6 - 4.8 g/dL GLOBULIN, TOTAL 2.9 1.5 - 4.5 g/dL A-G Ratio 1.4 1.2 - 2.2 Bilirubin, total 0.4 0.0 - 1.2 mg/dL Alk. phosphatase 109 39 - 117 IU/L  
 AST (SGOT) 18 0 - 40 IU/L  
 ALT (SGPT) 20 0 - 32 IU/L Narrative Performed at:  [address]  [id number] : Cordell Robles MD, Phone:  [phone] RECOMMENDATIONS: 
 
1.  White count is unchanged.  Continue with Clindamycin.    
2.  Potassium 3.9 which it within normal limits.  Do not need to take Potassium supplements.    
Other labs stable Please call me if you have any questions: [phone] Sincerely, Danielle Perea NP

## 2018-01-30 LAB
ALBUMIN SERPL-MCNC: 4 G/DL (ref 3.6–4.8)
ALBUMIN/GLOB SERPL: 1.4 {RATIO} (ref 1.2–2.2)
ALP SERPL-CCNC: 109 IU/L (ref 39–117)
ALT SERPL-CCNC: 20 IU/L (ref 0–32)
AST SERPL-CCNC: 18 IU/L (ref 0–40)
BASOPHILS # BLD AUTO: 0 X10E3/UL (ref 0–0.2)
BASOPHILS NFR BLD AUTO: 0 %
BILIRUB SERPL-MCNC: 0.4 MG/DL (ref 0–1.2)
BUN SERPL-MCNC: 18 MG/DL (ref 8–27)
BUN/CREAT SERPL: 23 (ref 12–28)
CALCIUM SERPL-MCNC: 9.8 MG/DL (ref 8.7–10.3)
CHLORIDE SERPL-SCNC: 98 MMOL/L (ref 96–106)
CO2 SERPL-SCNC: 25 MMOL/L (ref 18–29)
CREAT SERPL-MCNC: 0.79 MG/DL (ref 0.57–1)
EOSINOPHIL # BLD AUTO: 0.3 X10E3/UL (ref 0–0.4)
EOSINOPHIL NFR BLD AUTO: 3 %
ERYTHROCYTE [DISTWIDTH] IN BLOOD BY AUTOMATED COUNT: 14.7 % (ref 12.3–15.4)
GFR SERPLBLD CREATININE-BSD FMLA CKD-EPI: 82 ML/MIN/1.73
GFR SERPLBLD CREATININE-BSD FMLA CKD-EPI: 94 ML/MIN/1.73
GLOBULIN SER CALC-MCNC: 2.9 G/DL (ref 1.5–4.5)
GLUCOSE SERPL-MCNC: 90 MG/DL (ref 65–99)
HCT VFR BLD AUTO: 42.8 % (ref 34–46.6)
HGB BLD-MCNC: 14.2 G/DL (ref 11.1–15.9)
IMM GRANULOCYTES # BLD: 0 X10E3/UL (ref 0–0.1)
IMM GRANULOCYTES NFR BLD: 0 %
LYMPHOCYTES # BLD AUTO: 2.5 X10E3/UL (ref 0.7–3.1)
LYMPHOCYTES NFR BLD AUTO: 20 %
MCH RBC QN AUTO: 29.3 PG (ref 26.6–33)
MCHC RBC AUTO-ENTMCNC: 33.2 G/DL (ref 31.5–35.7)
MCV RBC AUTO: 88 FL (ref 79–97)
MONOCYTES # BLD AUTO: 1.6 X10E3/UL (ref 0.1–0.9)
MONOCYTES NFR BLD AUTO: 13 %
NEUTROPHILS # BLD AUTO: 7.9 X10E3/UL (ref 1.4–7)
NEUTROPHILS NFR BLD AUTO: 64 %
PLATELET # BLD AUTO: 306 X10E3/UL (ref 150–379)
POTASSIUM SERPL-SCNC: 3.9 MMOL/L (ref 3.5–5.2)
PROT SERPL-MCNC: 6.9 G/DL (ref 6–8.5)
RBC # BLD AUTO: 4.85 X10E6/UL (ref 3.77–5.28)
SODIUM SERPL-SCNC: 140 MMOL/L (ref 134–144)
WBC # BLD AUTO: 12.4 X10E3/UL (ref 3.4–10.8)

## 2018-01-30 NOTE — PROGRESS NOTES
1.  White count is unchanged. Continue with Clindamycin. 2.  Potassium 3.9 which it within normal limits. Do not need to take Potassium supplements. Other labs stable.

## 2018-01-31 ENCOUNTER — HOSPITAL ENCOUNTER (INPATIENT)
Age: 63
LOS: 4 days | Discharge: HOME | DRG: 872 | End: 2018-02-05
Attending: EMERGENCY MEDICINE | Admitting: FAMILY MEDICINE
Payer: COMMERCIAL

## 2018-01-31 DIAGNOSIS — L03.116 CELLULITIS OF LEFT LOWER EXTREMITY: ICD-10-CM

## 2018-01-31 DIAGNOSIS — L03.115 CELLULITIS OF RIGHT LOWER EXTREMITY: Primary | ICD-10-CM

## 2018-01-31 LAB
ALBUMIN SERPL-MCNC: 3 G/DL (ref 3.5–5)
ALBUMIN/GLOB SERPL: 0.7 {RATIO} (ref 1.1–2.2)
ALP SERPL-CCNC: 105 U/L (ref 45–117)
ALT SERPL-CCNC: 26 U/L (ref 12–78)
ANION GAP SERPL CALC-SCNC: 9 MMOL/L (ref 5–15)
AST SERPL-CCNC: 16 U/L (ref 15–37)
BASOPHILS # BLD: 0 K/UL (ref 0–0.1)
BASOPHILS NFR BLD: 0 % (ref 0–1)
BILIRUB SERPL-MCNC: 0.7 MG/DL (ref 0.2–1)
BUN SERPL-MCNC: 21 MG/DL (ref 6–20)
BUN/CREAT SERPL: 24 (ref 12–20)
CALCIUM SERPL-MCNC: 9.3 MG/DL (ref 8.5–10.1)
CHLORIDE SERPL-SCNC: 99 MMOL/L (ref 97–108)
CO2 SERPL-SCNC: 28 MMOL/L (ref 21–32)
CREAT SERPL-MCNC: 0.89 MG/DL (ref 0.55–1.02)
DIFFERENTIAL METHOD BLD: ABNORMAL
EOSINOPHIL # BLD: 0 K/UL (ref 0–0.4)
EOSINOPHIL NFR BLD: 0 % (ref 0–7)
ERYTHROCYTE [DISTWIDTH] IN BLOOD BY AUTOMATED COUNT: 14.2 % (ref 11.5–14.5)
FLUAV AG NPH QL IA: NEGATIVE
FLUBV AG NOSE QL IA: NEGATIVE
GLOBULIN SER CALC-MCNC: 4.5 G/DL (ref 2–4)
GLUCOSE SERPL-MCNC: 120 MG/DL (ref 65–100)
HCT VFR BLD AUTO: 42.1 % (ref 35–47)
HGB BLD-MCNC: 14 G/DL (ref 11.5–16)
IMM GRANULOCYTES # BLD: 0.3 K/UL (ref 0–0.04)
IMM GRANULOCYTES NFR BLD AUTO: 1 % (ref 0–0.5)
LACTATE SERPL-SCNC: 1.1 MMOL/L (ref 0.4–2)
LYMPHOCYTES # BLD: 1.6 K/UL (ref 0.8–3.5)
LYMPHOCYTES NFR BLD: 6 % (ref 12–49)
MCH RBC QN AUTO: 29.4 PG (ref 26–34)
MCHC RBC AUTO-ENTMCNC: 33.3 G/DL (ref 30–36.5)
MCV RBC AUTO: 88.3 FL (ref 80–99)
MONOCYTES # BLD: 1.3 K/UL (ref 0–1)
MONOCYTES NFR BLD: 5 % (ref 5–13)
NEUTS SEG # BLD: 23.5 K/UL (ref 1.8–8)
NEUTS SEG NFR BLD: 88 % (ref 32–75)
NRBC # BLD: 0 K/UL (ref 0–0.01)
NRBC BLD-RTO: 0 PER 100 WBC
PLATELET # BLD AUTO: 293 K/UL (ref 150–400)
PMV BLD AUTO: 10 FL (ref 8.9–12.9)
POTASSIUM SERPL-SCNC: 3.2 MMOL/L (ref 3.5–5.1)
PROT SERPL-MCNC: 7.5 G/DL (ref 6.4–8.2)
RBC # BLD AUTO: 4.77 M/UL (ref 3.8–5.2)
RBC MORPH BLD: ABNORMAL
SODIUM SERPL-SCNC: 136 MMOL/L (ref 136–145)
WBC # BLD AUTO: 26.7 K/UL (ref 3.6–11)

## 2018-01-31 PROCEDURE — 74011250637 HC RX REV CODE- 250/637: Performed by: EMERGENCY MEDICINE

## 2018-01-31 PROCEDURE — 87804 INFLUENZA ASSAY W/OPTIC: CPT | Performed by: EMERGENCY MEDICINE

## 2018-01-31 PROCEDURE — 85025 COMPLETE CBC W/AUTO DIFF WBC: CPT | Performed by: EMERGENCY MEDICINE

## 2018-01-31 PROCEDURE — 93971 EXTREMITY STUDY: CPT

## 2018-01-31 PROCEDURE — 74011250636 HC RX REV CODE- 250/636: Performed by: EMERGENCY MEDICINE

## 2018-01-31 PROCEDURE — 36415 COLL VENOUS BLD VENIPUNCTURE: CPT | Performed by: EMERGENCY MEDICINE

## 2018-01-31 PROCEDURE — 80053 COMPREHEN METABOLIC PANEL: CPT | Performed by: EMERGENCY MEDICINE

## 2018-01-31 PROCEDURE — 87040 BLOOD CULTURE FOR BACTERIA: CPT | Performed by: EMERGENCY MEDICINE

## 2018-01-31 PROCEDURE — 99283 EMERGENCY DEPT VISIT LOW MDM: CPT

## 2018-01-31 PROCEDURE — 83605 ASSAY OF LACTIC ACID: CPT | Performed by: EMERGENCY MEDICINE

## 2018-01-31 PROCEDURE — 96365 THER/PROPH/DIAG IV INF INIT: CPT

## 2018-01-31 RX ORDER — SODIUM CHLORIDE 9 MG/ML
1000 INJECTION, SOLUTION INTRAVENOUS ONCE
Status: COMPLETED | OUTPATIENT
Start: 2018-01-31 | End: 2018-02-01

## 2018-01-31 RX ORDER — ACETAMINOPHEN 500 MG
1000 TABLET ORAL
Status: COMPLETED | OUTPATIENT
Start: 2018-01-31 | End: 2018-01-31

## 2018-01-31 RX ORDER — VANCOMYCIN 2 GRAM/500 ML IN 0.9 % SODIUM CHLORIDE INTRAVENOUS
2
Status: COMPLETED | OUTPATIENT
Start: 2018-01-31 | End: 2018-02-01

## 2018-01-31 RX ADMIN — SODIUM CHLORIDE 1000 ML: 900 INJECTION, SOLUTION INTRAVENOUS at 23:18

## 2018-01-31 RX ADMIN — ACETAMINOPHEN 1000 MG: 500 TABLET, FILM COATED ORAL at 23:41

## 2018-01-31 RX ADMIN — VANCOMYCIN HYDROCHLORIDE 2000 MG: 10 INJECTION, POWDER, LYOPHILIZED, FOR SOLUTION INTRAVENOUS at 23:18

## 2018-01-31 NOTE — IP AVS SNAPSHOT
[address] [place] [address] 
[phone] Patient: Yanely Lai MRN: [id number] LUJ:2/91/3323 A check shaji indicates which time of day the medication should be taken. My Medications START taking these medications Instructions Each Dose to Equal  
 Morning Noon Evening Bedtime L. acidoph & paracasei- S therm- Bifido 8 billion cell Cap cap Commonly known as:  YANICK-Q/RISAQUAD Your last dose was: Your next dose is: Take 1 Cap by mouth daily. 1 Cap  
    
   
   
   
  
 levoFLOXacin 750 mg tablet Commonly known as:  Clerance Folk Your last dose was: Your next dose is: Take 1 Tab by mouth daily for 7 days. Indications: Skin and Skin Structure Infection 750 mg CHANGE how you take these medications Instructions Each Dose to Equal  
 Morning Noon Evening Bedtime * furosemide 20 mg tablet Commonly known as:  LASIX What changed:  Another medication with the same name was added. Make sure you understand how and when to take each. Your last dose was: Your next dose is: Take 1 Tab by mouth daily. 20 mg  
    
   
   
   
  
 * furosemide 20 mg tablet Commonly known as:  LASIX What changed: You were already taking a medication with the same name, and this prescription was added. Make sure you understand how and when to take each. Your last dose was: Your next dose is: Take 1 Tab by mouth daily. 20 mg  
    
   
   
   
  
 * Notice: This list has 2 medication(s) that are the same as other medications prescribed for you. Read the directions carefully, and ask your doctor or other care provider to review them with you. CONTINUE taking these medications Instructions Each Dose to Equal  
 Morning Noon Evening Bedtime  
 aspirin 81 mg chewable tablet Your last dose was: Your next dose is: Take 81 mg by mouth daily. 81 mg  
    
   
   
   
  
 clindamycin 300 mg capsule Commonly known as:  CLEOCIN Your last dose was: Your next dose is: Take 1 Cap by mouth three (3) times daily for 7 days. Indications: STAPHYLOCOCCUS AUREUS SKIN AND SKIN STRUCTURE INFECTION  
 300 mg  
    
   
   
   
  
 clobetasol 0.05 % topical cream  
Commonly known as:  Sherlyn Pleitez Your last dose was: Your next dose is:    
   
   
 Apply  to affected area two (2) times a day. FOR PSORASIS  
     
   
   
   
  
 ipratropium-albuterol  mcg/actuation inhaler Commonly known as:  COMBIVENT Your last dose was: Your next dose is: Take 2 Puffs by inhalation every six (6) hours as needed for Wheezing. 2 Puff  
    
   
   
   
  
 losartan 50 mg tablet Commonly known as:  COZAAR Your last dose was: Your next dose is: TAKE 1 TABLET BY MOUTH DAILY. lovastatin 40 mg tablet Commonly known as:  MEVACOR Your last dose was: Your next dose is: TAKE 1 TABLET BY MOUTH NIGHTLY  
     
   
   
   
  
 metroNIDAZOLE 1 % topical gel Commonly known as:  Carter Cool Your last dose was: Your next dose is:    
   
   
 Apply  to affected area daily. Use a thin layer to affected areas after washing  
     
   
   
   
  
 montelukast 10 mg tablet Commonly known as:  SINGULAIR Your last dose was: Your next dose is: TAKE 1 TABLET BY MOUTH EVERY DAY  
     
   
   
   
  
 potassium chloride 20 mEq tablet Commonly known as:  K-DURMARIA DEL ROSARIOOR-CON Your last dose was: Your next dose is: Take 1 Tab by mouth daily. 20 mEq  
    
   
   
   
  
 triamterene-hydroCHLOROthiazide 37.5-25 mg per tablet Commonly known as:  Marijean Scheuermann Your last dose was: Your next dose is: TAKE 1 TABLET BY MOUTH EVERY DAY  
     
   
   
   
  
 VITAMIN D3 PO Your last dose was: Your next dose is: Take 1,000 mg by mouth daily. 1000 mg Where to Get Your Medications Information on where to get these meds will be given to you by the nurse or doctor. ! Ask your nurse or doctor about these medications  
  clindamycin 300 mg capsule  
 furosemide 20 mg tablet L. acidoph & paracasei- S therm- Bifido 8 billion cell Cap cap  
 levoFLOXacin 750 mg tablet

## 2018-01-31 NOTE — IP AVS SNAPSHOT
[address] [facility] [address] [place] [address] 
[phone] Patient: Zandra Gary MRN: [id number] KYP:5[birth year] About your hospitalization You were admitted on:  February 1, 2018 You last received care in the:  [address] You were discharged on:  February 5, 2018 Why you were hospitalized Your primary diagnosis was:  Cellulitis Of Right Lower Extremity Follow-up Information Follow up With Details Comments Contact Info Marielle Melgoza DO Go on 2/7/2018 Primary Care follow-up 1:40 p.m.  [address] [facility] [address] [place] [address] 
[phone] Your Scheduled Appointments Wednesday February 07, 2018  1:40 PM EST  
ROUTINE CARE with Jacqui Larios NP [facility] Internal Medicine ([facility]) 200 [facility] 102 [address] [place] [address]  
[phone] Tuesday March 06, 2018 10:00 AM EST  
ROUTINE CARE with Marielle Melgoza DO [facility] Internal Medicine ([facility]) 200 [facility] 102 [address] [place] [address]  
[phone] Discharge Orders None A check shaji indicates which time of day the medication should be taken. My Medications START taking these medications Instructions Each Dose to Equal  
 Morning Noon Evening Bedtime L. acidoph & paracasei- S therm- Bifido 8 billion cell Cap cap Commonly known as:  YANICK-Q/RISAQUAD Your last dose was: Your next dose is: Take 1 Cap by mouth daily. 1 Cap  
    
   
   
   
  
 levoFLOXacin 750 mg tablet Commonly known as:  Lawrence Douglas Your last dose was: Your next dose is: Take 1 Tab by mouth daily for 7 days. Indications: Skin and Skin Structure Infection 750 mg CHANGE how you take these medications Instructions Each Dose to Equal  
 Morning Noon Evening Bedtime * furosemide 20 mg tablet Commonly known as:  LASIX What changed:  Another medication with the same name was added. Make sure you understand how and when to take each. Your last dose was: Your next dose is: Take 1 Tab by mouth daily. 20 mg  
    
   
   
   
  
 * furosemide 20 mg tablet Commonly known as:  LASIX What changed: You were already taking a medication with the same name, and this prescription was added. Make sure you understand how and when to take each. Your last dose was: Your next dose is: Take 1 Tab by mouth daily. 20 mg  
    
   
   
   
  
 * Notice: This list has 2 medication(s) that are the same as other medications prescribed for you. Read the directions carefully, and ask your doctor or other care provider to review them with you. CONTINUE taking these medications Instructions Each Dose to Equal  
 Morning Noon Evening Bedtime  
 aspirin 81 mg chewable tablet Your last dose was: Your next dose is: Take 81 mg by mouth daily. 81 mg  
    
   
   
   
  
 clindamycin 300 mg capsule Commonly known as:  CLEOCIN Your last dose was: Your next dose is: Take 1 Cap by mouth three (3) times daily for 7 days. Indications: STAPHYLOCOCCUS AUREUS SKIN AND SKIN STRUCTURE INFECTION  
 300 mg  
    
   
   
   
  
 clobetasol 0.05 % topical cream  
Commonly known as:  Rupinder Coupe Your last dose was: Your next dose is:    
   
   
 Apply  to affected area two (2) times a day. FOR PSORASIS  
     
   
   
   
  
 ipratropium-albuterol  mcg/actuation inhaler Commonly known as:  COMBIVENT Your last dose was: Your next dose is: Take 2 Puffs by inhalation every six (6) hours as needed for Wheezing. 2 Puff  
    
   
   
   
  
 losartan 50 mg tablet Commonly known as:  COZAAR Your last dose was: Your next dose is: TAKE 1 TABLET BY MOUTH DAILY. lovastatin 40 mg tablet Commonly known as:  MEVACOR Your last dose was: Your next dose is: TAKE 1 TABLET BY MOUTH NIGHTLY  
     
   
   
   
  
 metroNIDAZOLE 1 % topical gel Commonly known as:  Eulis Miu Your last dose was: Your next dose is:    
   
   
 Apply  to affected area daily. Use a thin layer to affected areas after washing  
     
   
   
   
  
 montelukast 10 mg tablet Commonly known as:  SINGULAIR Your last dose was: Your next dose is: TAKE 1 TABLET BY MOUTH EVERY DAY  
     
   
   
   
  
 potassium chloride 20 mEq tablet Commonly known as:  K-DUR, KLOR-CON Your last dose was: Your next dose is: Take 1 Tab by mouth daily. 20 mEq  
    
   
   
   
  
 triamterene-hydroCHLOROthiazide 37.5-25 mg per tablet Commonly known as:  Carrie Bold Your last dose was: Your next dose is: TAKE 1 TABLET BY MOUTH EVERY DAY  
     
   
   
   
  
 VITAMIN D3 PO Your last dose was: Your next dose is: Take 1,000 mg by mouth daily. 1000 mg Where to Get Your Medications Information on where to get these meds will be given to you by the nurse or doctor. ! Ask your nurse or doctor about these medications  
  clindamycin 300 mg capsule  
 furosemide 20 mg tablet L. acidoph & paracasei- S therm- Bifido 8 billion cell Cap cap  
 levoFLOXacin 750 mg tablet Discharge Instructions Discharge Instructions PATIENT ID: Dorothy Maradiaga MRN: [id number] YOB: 1955 DATE OF ADMISSION: 1/31/2018  9:30 PM   
DATE OF DISCHARGE: 2/5/2018 PRIMARY CARE PROVIDER: Mitch Puentes DO  
 
 
DISCHARGING PHYSICIAN: Matheus Hall MD   
To contact this individual call [phone] and ask the  to page. If unavailable ask to be transferred the Adult Hospitalist Department. DISCHARGE DIAGNOSES : Right lower extremity cellulitis, Sepsis CONSULTATIONS: IP CONSULT TO HOSPITALIST 
IP CONSULT TO PODIATRY PROCEDURES/SURGERIES: * No surgery found * PENDING TEST RESULTS:  
At the time of discharge the following test results are still pending: FOLLOW UP APPOINTMENTS:  
Follow-up Information None ADDITIONAL CARE RECOMMENDATIONS:  
 
DIET: Cardiac Diet ACTIVITY: Activity as tolerated WOUND CARE: None EQUIPMENT needed:  
 
 
  
 SNF/Inpatient Rehab/LTAC Independent/assisted living Hospice Other: CDMP Checked: Yes X Signed:  
Tyrone Polo MD 
2/5/2018 
7:45 AM 
 
  
  
  
[employer] Announcement We are excited to announce that we are making your provider's discharge notes available to you in [employer]. You will see these notes when they are completed and signed by the physician that discharged you from your recent hospital stay.   If you have any questions or concerns about any information you see in [employer], please call the Health Information Department where you were seen or reach out to your Primary Care Provider for more information about your plan of care. Introducing [address]! Dear Baron Riley: Thank you for requesting a [employer] account. Our records indicate that you already have an active [employer] account. You can access your account anytime at [email or web address]. [email or web address] Did you know that you can access your hospital and ER discharge instructions at any time in [employer]? You can also review all of your test results from your hospital stay or ER visit. Additional Information If you have questions, please visit the Frequently Asked Questions section of the [employer] website at [email or web address] Remember, [employer] is NOT to be used for urgent needs. For medical emergencies, dial 911. Now available from your iPhone and Android! Providers Seen During Your Hospitalization Provider Specialty Primary office phone Ayah Hernandez MD Emergency Medicine [phone] Flo Prajapati MD Family Practice [phone] Dwight Triana MD Internal Medicine [phone] Your Primary Care Physician (PCP) Primary Care Physician Office Phone Office Fax Arlyn Founds [phone] You are allergic to the following Allergen Reactions Codeine Shortness of Breath Swelling Pcn (Penicillins) Shortness of Breath Swelling E-Mycin E Hives Nausea and Vomiting Scopolamine Swelling Adhesive Tape-Silicones Rash Swelling Recent Documentation Height Weight BMI OB Status Smoking Status 1.524 m 134.5 kg 57.91 kg/m2 Hysterectomy Current Every Day Smoker Emergency Contacts Name Discharge Info Relation Home Work Mobile Shelby Kelly DISCHARGE CAREGIVER [3] Child [2] [phone] Patient Belongings  The following personal items are in your possession at time of discharge: 
  Dental Appliances: Uppers  Visual Aid: Glasses, With patient      Home Medications: None      Clothing: Undergarments, With patient, Sweater, Shirt, Pants, Pajamas, Footwear, At bedside    Other Valuables: Cell Phone, At bedside Please provide this summary of care documentation to your next provider. Signatures-by signing, you are acknowledging that this After Visit Summary has been reviewed with you and you have received a copy. Patient Signature:  ____________________________________________________________ Date:  ____________________________________________________________  
  
[facility] Provider Signature:  ____________________________________________________________ Date:  ____________________________________________________________

## 2018-02-01 ENCOUNTER — DOCUMENTATION ONLY (OUTPATIENT)
Dept: OTHER | Age: 63
End: 2018-02-01

## 2018-02-01 PROBLEM — L03.115 CELLULITIS OF RIGHT LOWER EXTREMITY: Status: ACTIVE | Noted: 2018-02-01

## 2018-02-01 LAB
ANION GAP SERPL CALC-SCNC: 10 MMOL/L (ref 5–15)
ATRIAL RATE: 92 BPM
BASOPHILS # BLD: 0 K/UL (ref 0–0.1)
BASOPHILS NFR BLD: 0 % (ref 0–1)
BUN SERPL-MCNC: 16 MG/DL (ref 6–20)
BUN/CREAT SERPL: 18 (ref 12–20)
CALCIUM SERPL-MCNC: 8.6 MG/DL (ref 8.5–10.1)
CALCULATED P AXIS, ECG09: 65 DEGREES
CALCULATED R AXIS, ECG10: 25 DEGREES
CALCULATED T AXIS, ECG11: 31 DEGREES
CHLORIDE SERPL-SCNC: 103 MMOL/L (ref 97–108)
CO2 SERPL-SCNC: 26 MMOL/L (ref 21–32)
CREAT SERPL-MCNC: 0.87 MG/DL (ref 0.55–1.02)
DIAGNOSIS, 93000: NORMAL
DIFFERENTIAL METHOD BLD: ABNORMAL
EOSINOPHIL # BLD: 0 K/UL (ref 0–0.4)
EOSINOPHIL NFR BLD: 0 % (ref 0–7)
ERYTHROCYTE [DISTWIDTH] IN BLOOD BY AUTOMATED COUNT: 14.4 % (ref 11.5–14.5)
GLUCOSE SERPL-MCNC: 107 MG/DL (ref 65–100)
HCT VFR BLD AUTO: 40.3 % (ref 35–47)
HGB BLD-MCNC: 13.2 G/DL (ref 11.5–16)
IMM GRANULOCYTES # BLD: 0 K/UL
IMM GRANULOCYTES NFR BLD AUTO: 0 %
LYMPHOCYTES # BLD: 1 K/UL (ref 0.8–3.5)
LYMPHOCYTES NFR BLD: 4 % (ref 12–49)
MCH RBC QN AUTO: 29.1 PG (ref 26–34)
MCHC RBC AUTO-ENTMCNC: 32.8 G/DL (ref 30–36.5)
MCV RBC AUTO: 88.8 FL (ref 80–99)
METAMYELOCYTES NFR BLD MANUAL: 1 %
MONOCYTES # BLD: 1.3 K/UL (ref 0–1)
MONOCYTES NFR BLD: 5 % (ref 5–13)
NEUTS SEG # BLD: 22.8 K/UL (ref 1.8–8)
NEUTS SEG NFR BLD: 90 % (ref 32–75)
NRBC # BLD: 0 K/UL (ref 0–0.01)
NRBC BLD-RTO: 0 PER 100 WBC
P-R INTERVAL, ECG05: 156 MS
PLATELET # BLD AUTO: 290 K/UL (ref 150–400)
PLATELET COMMENTS,PCOM: ABNORMAL
PMV BLD AUTO: 9.9 FL (ref 8.9–12.9)
POTASSIUM SERPL-SCNC: 3.3 MMOL/L (ref 3.5–5.1)
Q-T INTERVAL, ECG07: 364 MS
QRS DURATION, ECG06: 94 MS
QTC CALCULATION (BEZET), ECG08: 450 MS
RBC # BLD AUTO: 4.54 M/UL (ref 3.8–5.2)
RBC MORPH BLD: ABNORMAL
RBC MORPH BLD: ABNORMAL
SODIUM SERPL-SCNC: 139 MMOL/L (ref 136–145)
VENTRICULAR RATE, ECG03: 92 BPM
WBC # BLD AUTO: 25.3 K/UL (ref 3.6–11)

## 2018-02-01 PROCEDURE — 74011250637 HC RX REV CODE- 250/637: Performed by: FAMILY MEDICINE

## 2018-02-01 PROCEDURE — 74011250636 HC RX REV CODE- 250/636: Performed by: FAMILY MEDICINE

## 2018-02-01 PROCEDURE — 80048 BASIC METABOLIC PNL TOTAL CA: CPT | Performed by: FAMILY MEDICINE

## 2018-02-01 PROCEDURE — 85025 COMPLETE CBC W/AUTO DIFF WBC: CPT | Performed by: FAMILY MEDICINE

## 2018-02-01 PROCEDURE — 93041 RHYTHM ECG TRACING: CPT

## 2018-02-01 PROCEDURE — 96366 THER/PROPH/DIAG IV INF ADDON: CPT

## 2018-02-01 PROCEDURE — 36415 COLL VENOUS BLD VENIPUNCTURE: CPT | Performed by: FAMILY MEDICINE

## 2018-02-01 PROCEDURE — 65210000002 HC RM PRIVATE GYN

## 2018-02-01 RX ORDER — TRIAMTERENE/HYDROCHLOROTHIAZID 37.5-25 MG
1 TABLET ORAL DAILY
Status: DISCONTINUED | OUTPATIENT
Start: 2018-02-01 | End: 2018-02-05 | Stop reason: HOSPADM

## 2018-02-01 RX ORDER — SODIUM CHLORIDE 0.9 % (FLUSH) 0.9 %
5-10 SYRINGE (ML) INJECTION EVERY 8 HOURS
Status: DISCONTINUED | OUTPATIENT
Start: 2018-02-01 | End: 2018-02-05 | Stop reason: HOSPADM

## 2018-02-01 RX ORDER — FUROSEMIDE 20 MG/1
20 TABLET ORAL DAILY
Status: DISCONTINUED | OUTPATIENT
Start: 2018-02-01 | End: 2018-02-05 | Stop reason: HOSPADM

## 2018-02-01 RX ORDER — SODIUM CHLORIDE 0.9 % (FLUSH) 0.9 %
5-10 SYRINGE (ML) INJECTION AS NEEDED
Status: DISCONTINUED | OUTPATIENT
Start: 2018-02-01 | End: 2018-02-05 | Stop reason: HOSPADM

## 2018-02-01 RX ORDER — VANCOMYCIN 2 GRAM/500 ML IN 0.9 % SODIUM CHLORIDE INTRAVENOUS
2000 EVERY 24 HOURS
Status: DISCONTINUED | OUTPATIENT
Start: 2018-02-01 | End: 2018-02-04

## 2018-02-01 RX ORDER — LOSARTAN POTASSIUM 50 MG/1
50 TABLET ORAL DAILY
Status: DISCONTINUED | OUTPATIENT
Start: 2018-02-01 | End: 2018-02-05 | Stop reason: HOSPADM

## 2018-02-01 RX ORDER — MONTELUKAST SODIUM 10 MG/1
10 TABLET ORAL
Status: DISCONTINUED | OUTPATIENT
Start: 2018-02-01 | End: 2018-02-05 | Stop reason: HOSPADM

## 2018-02-01 RX ORDER — GUAIFENESIN 100 MG/5ML
81 LIQUID (ML) ORAL DAILY
Status: DISCONTINUED | OUTPATIENT
Start: 2018-02-01 | End: 2018-02-05 | Stop reason: HOSPADM

## 2018-02-01 RX ORDER — ACETAMINOPHEN 325 MG/1
650 TABLET ORAL
Status: DISCONTINUED | OUTPATIENT
Start: 2018-02-01 | End: 2018-02-05 | Stop reason: HOSPADM

## 2018-02-01 RX ORDER — MELATONIN
1000 DAILY
Status: DISCONTINUED | OUTPATIENT
Start: 2018-02-01 | End: 2018-02-05 | Stop reason: HOSPADM

## 2018-02-01 RX ORDER — LOVASTATIN 20 MG/1
40 TABLET ORAL DAILY
Status: DISCONTINUED | OUTPATIENT
Start: 2018-02-01 | End: 2018-02-05 | Stop reason: HOSPADM

## 2018-02-01 RX ADMIN — FUROSEMIDE 20 MG: 20 TABLET ORAL at 09:14

## 2018-02-01 RX ADMIN — VANCOMYCIN HYDROCHLORIDE 2000 MG: 10 INJECTION, POWDER, LYOPHILIZED, FOR SOLUTION INTRAVENOUS at 22:36

## 2018-02-01 RX ADMIN — Medication 10 ML: at 22:37

## 2018-02-01 RX ADMIN — ASPIRIN 81 MG 81 MG: 81 TABLET ORAL at 09:16

## 2018-02-01 RX ADMIN — MONTELUKAST SODIUM 10 MG: 10 TABLET, FILM COATED ORAL at 22:36

## 2018-02-01 RX ADMIN — ACETAMINOPHEN 650 MG: 325 TABLET, FILM COATED ORAL at 19:20

## 2018-02-01 RX ADMIN — VITAMIN D 1000 UNITS: 25 TAB ORAL at 09:14

## 2018-02-01 RX ADMIN — LOSARTAN POTASSIUM 50 MG: 50 TABLET ORAL at 09:15

## 2018-02-01 RX ADMIN — TRIAMTERENE AND HYDROCHLOROTHIAZIDE 1 TABLET: 37.5; 25 TABLET ORAL at 09:15

## 2018-02-01 RX ADMIN — ACETAMINOPHEN 650 MG: 325 TABLET, FILM COATED ORAL at 06:24

## 2018-02-01 RX ADMIN — ACETAMINOPHEN 650 MG: 325 TABLET, FILM COATED ORAL at 12:50

## 2018-02-01 RX ADMIN — LOVASTATIN 40 MG: 20 TABLET ORAL at 09:16

## 2018-02-01 RX ADMIN — Medication 10 ML: at 06:49

## 2018-02-01 RX ADMIN — Medication 10 ML: at 14:00

## 2018-02-01 NOTE — PROGRESS NOTES
Pharmacist Note - Vancomycin Dosing    Consult provided for this 63 y.o. female for indication of leg cellulitis. Antibiotic regimen(s): monotherapy    Recent Labs      18   2203  18   1439   WBC  26.7*  12.4*   CREA  0.89  0.79   BUN  21*  18     Frequency of BMP: daily  Height: 152.4 cm  Weight: 134.5 kg  Est CrCl: ~86 ml/min; Temp (24hrs), Av.6 °F (37 °C), Min:98.3 °F (36.8 °C), Max:98.9 °F (37.2 °C)    Cultures:   blood    Goal trough = 10 - 15 mcg/mL    Therapy will be initiated with a loading dose of 2000 mg IV x 1 to be followed by a maintenance dose of 2000 mg IV every 24 hours. Pharmacy to follow patient daily and order levels / make dose adjustments as appropriate.

## 2018-02-01 NOTE — ED NOTES
Assumed care of patient. In NAD. Pt reports back and buttock pain from the ER bed mattress. Pt states the Tylenol helped with her HA. No complaints of leg pain currently, just redness and warmth to the RIGHT lower leg. 1L of NS still infusing. Vancomycin also infusing on pump. Pt is requesting crackers and juice. Will speak with PA.   Pt awaiting eval by Hospitalist for admission and also Doppler study

## 2018-02-01 NOTE — PROCEDURES
[facility]  *** FINAL REPORT ***    Name: Maria C Covarrubias  MRN: [id number]  : 1955  HIS Order #: [id number]  [id number] [facility] Visit #: [id number]  Date: 2018    TYPE OF TEST: Peripheral Venous Testing    REASON FOR TEST  Pain in limb    Right Leg:-  Deep venous thrombosis:           No  Superficial venous thrombosis:    Not examined  Deep venous insufficiency:        Not examined  Superficial venous insufficiency: Not examined      INTERPRETATION/FINDINGS  PROCEDURE:  Color duplex ultrasound imaging of lower extremity veins. FINDINGS:       Right: The common femoral, deep femoral, femoral, popliteal, and  great saphenous are patent and without evidence of thrombus; each is  is fully compressible and there is no narrowing of the flow channel on   color Doppler imaging. Phasic flow is observed in the common femoral   vein. There is limited visualization of the calf veins. Left:   The common femoral vein is patent and without evidence of   thrombus. Phasic flow is observed. This extremity was not otherwise   evaluated. IMPRESSION:  No evidence of right lower extremity vein thrombosis. Due   to limited visualization of calf veins, isolated calf vein thrombosis   cannot be excluded. ADDITIONAL COMMENTS    I have personally reviewed the data relevant to the interpretation of  this  study.     TECHNOLOGIST: Sherwin Mcdowell RDMS  Signed: 2018 01:33 AM    PHYSICIAN: Todd Gaspar MD  Signed: 2018 08:23 AM

## 2018-02-01 NOTE — PROGRESS NOTES
NUTRITION   RD Screen      Pt seen for:      [x] MST for poor PO   []   MD Consult    []        Supplements  []   PO intake check   []        Food Allergies  []   Food Preferences/tolerances    []        Rescreen  []   Education    []        Diet order clarification []   Other   []  LOS                          Nutrition Prescription:     Liberalized Diet to Regular  Encourage adequate intake of fluids, meals and supplements    Assessment:   Information obtained from:   patient        Pt admitted with lower extremity cellulitis. Visited with patient. Reports she usually follows a lower sodium diet at home and reports she \"makes pretty good choices. \"  MST is triggered b/c of reported wt loss PTA of ~6#. Most likely r/t to fluid loss. Was feeling nauseous a few days PTA. Ate B'fast well this morning. Cultural, Yazidism and ethnic food preferences:   [x]  None   []  Identified and addressed    Diet:  Cardiac    PO Intake: [x]   Good     []  Fair      []  Poor     No data found. Wt Readings from Last 5 Encounters:   01/31/18 134.5 kg (296 lb 8 oz)   01/29/18 134.1 kg (295 lb 9.6 oz)   01/25/18 137 kg (302 lb)   01/24/18 137 kg (302 lb)   09/05/17 134.3 kg (296 lb)   ]    Body mass index is 57.91 kg/(m^2). Skin: BLE cellulitis/redness   BM: PTA  ABD: WDL    Estimated Daily Nutrition Requirements:  Kcals/day: 2200 Kcals/day  Protein: 55 g ( - 59 (1.2-1.3 g/kg of IBW))  Fluid:  2200mL (1 mL/est kcal needs)      Based On:  [place]  Alex[place] (AF 1.2)  Weight Used: Actual wt (134.5 kg ([facility] AF 1.2))    Weight Changes:   [x]   Loss (2% in 1 week)  []   Gain  []   Stable    Nutrition Problems Identified  [x]     None  []     Specified food preferences   []     Dislikes supplements              []     Allergies  []     Difficulty chewing     []     Dentition   []     Nausea/Vomiting  []    Constipation  []    Diarrhea    Nutrition Diagnosis:      No nutrition diagnosis identified at this time    Intervention:   [x] Obtained/adjusted food preferences/tolerances and/or snacks options   []    Dislikes supplements will try a substitution   []    Modify diet for food allergies  []    Adjust texture due to difficulty chewing   [x]    Encourage Fresh Fruit, Activia yogurt, fluid   []    Educated patient  [x]    Rescreen per screening protocol  []    Add Supplements    Goal: n/a    Monitoring/Evaluation:   Education & Discharge Needs  [] Nutrition related discharge needs addressed:   [] Supplements (on d/c instruction &/or coupons provided)    [] Education   [x] No nutrition related discharge needs at this time    Rescreen: []  At Nutrition Risk          [x]  Not at Nutrition Risk, rescreen per screening protocol    Dejon Silvestre, MS, RD, CDE

## 2018-02-01 NOTE — ED PROVIDER NOTES
HPI Comments: 82-year-old female presents to the emergency room for evaluation of right lower leg redness and warmth. Patient reports associated fever tonight of 101. Right leg and turned red with pain on Monday, 2 days ago. Today the redness is decreased. Patient is currently being treated with clindamycin for a cellulitis of the left lower leg. Patient reports feeling flulike redness began one week ago but has improved with clindamycin. No associated runny nose, cough and congestion or sore throat. No abdominal pain, nausea vomiting. No URI symptoms. No muscle aches or body aches. No chest pain or shortness of breath. Patient took 2 Tylenol tonight. Pain is worse with sitting up. Tylenol does provide relief. Social hx  +smoker  No alcohol    Patient is a 63 y.o. female presenting with fever and skin problem. The history is provided by the patient. Fever    Pertinent negatives include no chest pain, no congestion, no headaches, no sore throat, no cough, no shortness of breath and no neck pain.    Skin Problem           Past Medical History:   Diagnosis Date    Angina decubitus ([address]     Arthritis     KNEES    Asthma     COPD     Gallstones     H/O seasonal allergies     History of blood transfusion EARLY     [facility], NO REACTION    Hypercholesterolemia     Hypertension     IBS (irritable bowel syndrome)     Morbid obesity (HCC)     Nausea & vomiting     WITH ALL 3 SURGERIES    Obesity     Other ill-defined conditions(799.89)     High cholesterol    Psoriasis     Recurrent umbilical hernia 129    Sleep apnea     DOESN'T USE CPAP; SLEEPS IN RECLINER FOR 2 YEARS    Unspecified adverse effect of anesthesia     SMALL AIRWAY, ENLARGED TONSILS    URI (upper respiratory infection) 2014       Past Surgical History:   Procedure Laterality Date    HX  SECTION      HX CHOLECYSTECTOMY  2011    by Dr Karon Chester 2010 - Benign    HX DILATION AND CURETTAGE  10/2012    HX HEART CATHETERIZATION  2011    HX HEENT      WISDOM TEETH EXTRACTION    HX HEENT Bilateral 1980'S    TAN. TEAR DUCT TUMOR REMOVAL    HX HEENT      ALL TEETH REMOVED    HX HERNIA REPAIR  7/21/7753    umbilical hernia repair by Dr Marnie Ortega  12/1/12    ventral hernia repair by Dr Jhonny Tirado  6/9/16    Laparoscopic repair of recurrent incisional hernia with mesh by Dr Clarisa Middleton  12/1/2012    Dr Domínguez Severe Left 2/09   2 PROCEDURES    ORIF L FOOT, 2ND REPAIR    HX WISDOM TEETH EXTRACTION      KNEE ARTHROSCP HARV      KNEE SCOPE,MED OR LAT MENIS REPAIR Bilateral 1980    TAN 2 SEPARATE SURGERIES SAME YEAR    LAP,CHOLECYSTECTOMY  4/27/11         Family History:   Problem Relation Age of Onset    Hypertension Mother     Post-op Nausea/Vomiting Mother     Stroke Mother     Post-op Nausea/Vomiting Daughter     Heart Disease Father     No Known Problems Sister     No Known Problems Sister     No Known Problems Sister        Social History     Social History    Marital status:      Spouse name: N/A    Number of children: N/A    Years of education: N/A     Occupational History    Not on file. Social History Main Topics    Smoking status: Current Every Day Smoker     Packs/day: 0.25     Years: 38.00     Types: Cigarettes    Smokeless tobacco: Never Used      Comment: DOWN TO 5 CIGARETTES PER DAY. SMOKED 2 PPD FOR 7 YEARS    Alcohol use No    Drug use: No    Sexual activity: Not on file     Other Topics Concern    Not on file     Social History Narrative         ALLERGIES: Codeine; Pcn [penicillins]; E-mycin e; Scopolamine; and Adhesive tape-silicones    Review of Systems   Constitutional: Positive for chills and fever. HENT: Negative for congestion, sore throat and trouble swallowing.     Respiratory: Negative for cough, chest tightness and shortness of breath. Cardiovascular: Negative for chest pain. Gastrointestinal: Negative for abdominal pain and nausea. Genitourinary: Negative for difficulty urinating and dysuria. Musculoskeletal: Negative for myalgias, neck pain and neck stiffness. Skin: Positive for color change. Negative for wound. Neurological: Negative for dizziness, weakness, light-headedness and headaches. All other systems reviewed and are negative. Vitals:    01/31/18 2115   BP: (!) 174/100   Pulse: (!) 112   Resp: 18   Temp: 98.9 °F (37.2 °C)   SpO2: 93%   Weight: 134.5 kg (296 lb 8 oz)   Height: 5' (1.524 m)            Physical Exam   Constitutional: She appears well-developed and well-nourished. No distress. HENT:   Head: Normocephalic and atraumatic. Eyes: Conjunctivae and EOM are normal. Pupils are equal, round, and reactive to light. Neck: Normal range of motion. Neck supple. Cardiovascular: Normal rate and regular rhythm. Pulmonary/Chest: Effort normal and breath sounds normal. No respiratory distress. She has no wheezes. She has no rales. She exhibits no tenderness. Abdominal: Soft. Bowel sounds are normal. She exhibits no distension and no mass. There is no tenderness. There is no rebound and no guarding. Musculoskeletal: Normal range of motion. Right leg:  + erythema and warmth and tenderness to palpation  To lower leg from foot to calf. 30 cm in length. 2+ dorsalis pedis, 2+ posterior tibialis. Cap refill brisk < 3 seconds,  Mild tenderness to palpation of calf. No palpable cords. Neurological: She is alert. She exhibits normal muscle tone. Coordination normal.   Skin: Skin is warm and dry. There is erythema. Nursing note and vitals reviewed. 72-year-old female presents to the emergency room for evaluation of abdominal pain, diarrhea, nausea, vomiting, fever. Patient states on Friday, 6 days ago she had vomiting. Patient has had intermittent nausea since then.  She did have one episode of vomiting last night. No blood has been noted in the emesis. Patient also complaining of diarrhea. Patient states anytime she eats or drinks she has diarrhea. Patient reports a fever of 102 over the weekend. No dysuria. Patient is reporting positive blood in the urine. She has taken Tylenol which does provide relief. No cough, congestion, runny nose or sore throat. No muscle aches or body aches. No back pain. No dizziness or lightheadedness. No known sick contacts. No alleviating factors. MDM  Number of Diagnoses or Management Options  Cellulitis of right lower extremity:   Diagnosis management comments: 62 yo female presenting to ER for evaluation of leg redness and fever. Worsening over past 2 days. Tylenol prior to arrival.  No fever in ER. On clindamycin. Lungs clear. +erythema and redness to leg. P:labs, ultrasound, iv fluid. 12:39 AM  Pt with elevated wbc. Slight tachycardia. No lactic acid. Pt not septic. Pt got 2 liters of fluid for heart rate. Tylenol as requested for pain. Pt getting vancomycin. Case has been discussed with Dr. Donal Ulloa hospitalist for admission. Amount and/or Complexity of Data Reviewed  Discuss the patient with other providers: yes (ER attending-[facility])    Patient Progress  Patient progress: stable        ED Course       Procedures    Pt case including HPI, PE, and all available lab and radiology results has been discussed with attending physician. Opportunity to evaluate patient has been provided to ER attending.

## 2018-02-01 NOTE — PROGRESS NOTES
SUKUMAR follow up. Patient with cellulitis / seen in ED and admitted for bilateral lower leg cellulitis 1/31. Chart review:  Negative doppler studies for DVT. Elevated WBC 26/  Dropped to 25 this am after IV Vanq. Will follow for [facility] services.

## 2018-02-01 NOTE — ROUTINE PROCESS
TRANSFER - OUT REPORT:    Verbal report given to Miya Bassett RN  (name) on Jc Severino  being transferred to [phone] (unit) for routine progression of care       Report consisted of patients Situation, Background, Assessment and   Recommendations(SBAR). Information from the following report(s) SBAR, ED Summary, [facility] and Recent Results was reviewed with the receiving nurse. Lines:   Peripheral IV 01/31/18 Left Hand (Active)   Site Assessment Clean, dry, & intact 1/31/2018 10:07 PM   Phlebitis Assessment 0 1/31/2018 10:07 PM   Infiltration Assessment 0 1/31/2018 10:07 PM   Dressing Status Clean, dry, & intact 1/31/2018 10:07 PM   Dressing Type Tape;Transparent 1/31/2018 10:07 PM   Hub Color/Line Status Blue;Flushed;Patent 1/31/2018 10:07 PM   Action Taken Blood drawn 1/31/2018 10:07 PM        Opportunity for questions and clarification was provided.       Patient transported with:   Transport

## 2018-02-01 NOTE — ED NOTES
Pt again up to bedside commode. Pt asking for more Tylenol due to back and buttock pain from the ER stretcher. Advised pt that she is going upstairs to a more comfortable mattress, that it has not been 4 hours from her last Tylenol dosage. Plus, pt took Tylenol around 1900 this evening.   VSS

## 2018-02-01 NOTE — ED TRIAGE NOTES
Triage Note: Patient was seen here on Thursday and diagnosed with cellulitis to the left leg. Patient reports the left leg has improved, but now she has redness to the right leg. Patient has been taking antibiotics as prescribed. +fever. Patient reports decreased appetite.

## 2018-02-01 NOTE — PROGRESS NOTES
Cm reviewed chart and noted patient came to ER due to right leg swelling, pain and redness. Patient was admitted with cellulitis and is receiving IV abx vancomycin. Patient has medical hx ofsrthritis, COPD, asthma, gallstones, hypertension, sleep apnea (no CPAP at home) and IBS. PCP is Dr No Howard and last office visit was 1/29/18. AMD in chart  DaughterPatricia [phone] is MPOA. [facility]ures medications at [facility].     Cm met with patient in her room to introduce self and explain role. Patient was alert and oriented. Confirmed the above and that she lives with her daughter, Gil Mclean and son in law in split level home. There are no children but are 2 dogs in the home. Patient sister who was also living in the home passed away last year. 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 She normally drives herself to work. Patient plans to return home when discharged. Family can transport. Not determined (patient was admitted today) if she will need IV abx. CM will follow and assist with infusion center or home IV abx and HH if needed. Care Management Interventions  PCP Verified by CM: Yes  Last Visit to PCP: 01/29/18  Mode of Transport at Discharge:  (car with family)  Transition of Care Consult (CM Consult): Discharge Planning  Discharge Durable Medical Equipment: No  Physical Therapy Consult: No  Occupational Therapy Consult: No  Current Support Network: Relative's Home (lives in daughter and son in laws split level home. self care and independent prior to admission.   Good famly support-  AMD in chart.)  Confirm Follow Up Transport: Family  Plan discussed with Pt/Family/Caregiver: Yes  Freedom of Choice Offered: Yes (not a )  [address] Provided?: No  Discharge Location  Discharge Placement:  (TBD--Home with or without IV abx therapy and HH)

## 2018-02-01 NOTE — H&P
[facility]  ACUTE CARE HISTORY AND PHYSICAL    Name:Yesenia HUGGINS  MR#: [id number]  : 1955  ACCOUNT #: [de-identified]   DATE OF SERVICE: 2018    CHIEF COMPLAINT:  Right leg redness, pain, swelling. HISTORY OF PRESENT ILLNESS:  A 80-year-old white female with past medical history of arthritis, asthma, COPD, gallstones, hypertension, hyperlipidemia, irritable bowel syndrome, morbid obesity, psoriasis, sleep apnea who presented to the emergency department from home with chief complaint of fever, right leg pain, swelling and redness. The patient notes onset of symptoms starting approximately 2 days ago with progressive redness, pain, swelling of the right leg to foot. The patient notes she had similar redness overlying the left leg, which is subsequently starting to fade away. However, symptoms progressed, worsened into the right leg. She reportedly had a fever, temperature of 100.6 degrees Fahrenheit at home. She reportedly had recently been treated for left leg cellulitis with clindamycin. On arrival to the emergency department, initial recorded vital signs, blood pressure 174/100, heart rate of 112, respiratory rate 18, O2 saturation 93% on room air. Labs showed an elevated WBC of 26,700, neutrophils of 88%. The patient was given 0.9% normal saline, 1000 mL IV fluid bolus x2, vancomycin 2 g IV and Tylenol 1000 mg p.o. The patient now seen for admission to the hospitalist service for further evaluation and treatment. Patient does not complain of any dizziness, lightheadedness, focal weakness, nausea, numbness, paresthesias, slurred speech, facial droop, headache, neck pain, back pain, chest pain, shortness of breath, cough, congestion, abdominal pain, nausea, vomiting, diarrhea, melena, dysuria, hematuria.     PAST MEDICAL HISTORY:  Arthritis, knee pain, asthma, COPD, gallstones, seasonal allergies, history of blood transfusion, hypertension, hyperlipidemia, irritable bowel syndrome (IBS), psoriasis, recurrent umbilical hernia, sleep apnea (does not use CPAP machine). PAST SURGICAL HISTORY:   section, cholecystectomy, cyst incision and drainage, dilation and curettage, heart catheterization, wisdom tooth extraction, bilateral tear duct tumor removal, ventral hernia repair, umbilical hernia repair, laparoscopic repair of recurrent incisional hernia, hysterectomy. MEDICATIONS:  Aspirin 81 mg p.o. q. daily, vitamin D3 at 1000 mg p.o. q. daily, clindamycin 300 mg 1 capsule p.o. t.i.d., clobetasol 0.05% topical cream apply to affected areas b.i.d., furosemide 20 mg p.o. q. daily, Combivent 2 puffs inhaled q. 6 hours p.r.n., losartan 50 mg p.o. q. daily, lovastatin 40 mg p.o. at bedtime, MetroGel 1% topical gel apply to affected areas q. daily, Singulair 10 mg p.o. q. daily, potassium chloride 20 mEq p.o. q. daily, triamterene/hydrochlorothiazide 37.5/25 mg 1 tablet p.o. q. daily. ALLERGIES:  CODEINE, PENICILLIN, ERYTHROMYCIN, SCOPOLAMINE, ADHESIVE TAPE, SILICONE.    SOCIAL HISTORY:  Positive smoking cigarettes, quarter of a pack per day. Denies alcohol or illicit drugs. . FAMILY HISTORY:  Hypertension, mother. Stroke, mother. Heart disease, father. REVIEW OF SYSTEMS:  All systems reviewed. Pertinent positives were as per HPI, otherwise negative. PHYSICAL EXAMINATION:  VITAL SIGNS:  Temperature 98.9 degrees Fahrenheit, blood pressure 122/55, heart rate 89, respiratory rate of 20, O2 saturation 96% on room air, recorded weight of 296 pounds (134.5 kg), reported height of 5 feet 0 inches tall. GENERAL:  Morbidly obese female, in no acute respiratory distress. PSYCHIATRIC:  Patient awake, alert, oriented x3. NEUROLOGIC:  GCS of 15. Moves extremities x4. Sensation grossly intact without slurred speech or facial droop. HEENT:  Normocephalic, atraumatic. PERRLA. EOMs intact. Sclerae anicteric. Conjunctivae clear. Nares are patent. Oropharynx clear. Tongue is midline, not edematous. NECK:  Supple without lymphadenopathy, JVD, carotid bruits, thyromegaly. LYMPHATIC:  No cervical or supraclavicular lymphadenopathy. LUNGS:  Clear to auscultation bilaterally. CARDIOVASCULAR:  Heart has regular rate and rhythm, normal S1, S2, without murmurs, rubs or gallops. GASTROINTESTINAL:  Abdomen is soft, nontender, nondistended, normoactive bowel sounds. No rebound, guarding, rigidity. No auscultated abdominal bruits or pulsatile mass. BACK:  No CVA tenderness. No step-offs. MUSCULOSKELETAL:  Tenderness on rotation of the left and the right leg. The patient has extensive demarcated erythematous area of the left leg. VASCULAR:  2+ radial, 1+ dorsalis pedis pulses, without cyanosis, positive clubbing. There is massive nonpitting edema bilateral lower extremities. SKIN:  Warm and dry. LABS:  Reviewed. Sodium 136, potassium 3.2, chloride 99, CO2 of 28, BUN 21, creatinine 0.89, glucose 120, anion gap 9. Calcium 9.3, GFR greater than 60, total bilirubin 0.7, total protein 7.5, albumin 3.1, ALT 26, AST 16, alk phos is 105. Lactic acid is 1.1. WBC 26.7, hemoglobin 14.0, hematocrit 42.1, platelets 486, neutrophils are 88%. Influenza A and B antigens negative. A 12-lead EKG:  Normal sinus rhythm at 92 beats a minute. IMPRESSION AND PLAN:  1. Cellulitis, right lower extremity. The patient was started on vancomycin for IV antibiotic coverage. Repeat CBC. 2.  Fever. Will have Tylenol p.r.n.  3.  Hypertension. Monitor blood pressure closely and provide antihypertensive med as needed. 4.  Leukocytosis. Repeat CBC in the a.m.  5.  SIRS (systemic inflammatory response syndrome) with tachycardia, leukocytosis. Repeat CBC. 6.  Acute hypokalemia. Replace potassium, repeat potassium level status post replacement. 7.  VTE prophylaxis. SCDs to lower extremities. MELVIN L. Juvenal Scheuermann, MD MLP / AGA  D: 02/01/2018 09:59 T: 02/01/2018 11:44  JOB #: [id number]

## 2018-02-02 LAB
ANION GAP SERPL CALC-SCNC: 6 MMOL/L (ref 5–15)
BASOPHILS # BLD: 0.1 K/UL (ref 0–0.1)
BASOPHILS NFR BLD: 0 % (ref 0–1)
BUN SERPL-MCNC: 17 MG/DL (ref 6–20)
BUN/CREAT SERPL: 18 (ref 12–20)
CALCIUM SERPL-MCNC: 9 MG/DL (ref 8.5–10.1)
CHLORIDE SERPL-SCNC: 101 MMOL/L (ref 97–108)
CO2 SERPL-SCNC: 31 MMOL/L (ref 21–32)
CREAT SERPL-MCNC: 0.96 MG/DL (ref 0.55–1.02)
DIFFERENTIAL METHOD BLD: ABNORMAL
EOSINOPHIL # BLD: 0.3 K/UL (ref 0–0.4)
EOSINOPHIL NFR BLD: 2 % (ref 0–7)
ERYTHROCYTE [DISTWIDTH] IN BLOOD BY AUTOMATED COUNT: 14.6 % (ref 11.5–14.5)
GLUCOSE SERPL-MCNC: 126 MG/DL (ref 65–100)
HCT VFR BLD AUTO: 39.3 % (ref 35–47)
HGB BLD-MCNC: 12.9 G/DL (ref 11.5–16)
IMM GRANULOCYTES # BLD: 0.1 K/UL (ref 0–0.04)
IMM GRANULOCYTES NFR BLD AUTO: 1 % (ref 0–0.5)
LYMPHOCYTES # BLD: 2.4 K/UL (ref 0.8–3.5)
LYMPHOCYTES NFR BLD: 14 % (ref 12–49)
MCH RBC QN AUTO: 29.4 PG (ref 26–34)
MCHC RBC AUTO-ENTMCNC: 32.8 G/DL (ref 30–36.5)
MCV RBC AUTO: 89.5 FL (ref 80–99)
MONOCYTES # BLD: 1.5 K/UL (ref 0–1)
MONOCYTES NFR BLD: 9 % (ref 5–13)
NEUTS SEG # BLD: 12.8 K/UL (ref 1.8–8)
NEUTS SEG NFR BLD: 74 % (ref 32–75)
NRBC # BLD: 0 K/UL (ref 0–0.01)
NRBC BLD-RTO: 0 PER 100 WBC
PLATELET # BLD AUTO: 279 K/UL (ref 150–400)
PMV BLD AUTO: 10.1 FL (ref 8.9–12.9)
POTASSIUM SERPL-SCNC: 3 MMOL/L (ref 3.5–5.1)
RBC # BLD AUTO: 4.39 M/UL (ref 3.8–5.2)
SODIUM SERPL-SCNC: 138 MMOL/L (ref 136–145)
WBC # BLD AUTO: 17.2 K/UL (ref 3.6–11)

## 2018-02-02 PROCEDURE — 74011250637 HC RX REV CODE- 250/637: Performed by: HOSPITALIST

## 2018-02-02 PROCEDURE — 74011250637 HC RX REV CODE- 250/637: Performed by: FAMILY MEDICINE

## 2018-02-02 PROCEDURE — 85025 COMPLETE CBC W/AUTO DIFF WBC: CPT | Performed by: FAMILY MEDICINE

## 2018-02-02 PROCEDURE — 36415 COLL VENOUS BLD VENIPUNCTURE: CPT | Performed by: FAMILY MEDICINE

## 2018-02-02 PROCEDURE — 74011250636 HC RX REV CODE- 250/636: Performed by: FAMILY MEDICINE

## 2018-02-02 PROCEDURE — 74011250636 HC RX REV CODE- 250/636: Performed by: HOSPITALIST

## 2018-02-02 PROCEDURE — 80048 BASIC METABOLIC PNL TOTAL CA: CPT | Performed by: FAMILY MEDICINE

## 2018-02-02 PROCEDURE — 65210000002 HC RM PRIVATE GYN

## 2018-02-02 RX ORDER — POTASSIUM CHLORIDE 750 MG/1
40 TABLET, FILM COATED, EXTENDED RELEASE ORAL
Status: COMPLETED | OUTPATIENT
Start: 2018-02-02 | End: 2018-02-02

## 2018-02-02 RX ORDER — SENNOSIDES 8.6 MG/1
1 TABLET ORAL DAILY
Status: DISCONTINUED | OUTPATIENT
Start: 2018-02-02 | End: 2018-02-05 | Stop reason: HOSPADM

## 2018-02-02 RX ORDER — ENOXAPARIN SODIUM 100 MG/ML
40 INJECTION SUBCUTANEOUS EVERY 24 HOURS
Status: DISCONTINUED | OUTPATIENT
Start: 2018-02-02 | End: 2018-02-05 | Stop reason: HOSPADM

## 2018-02-02 RX ORDER — LEVOFLOXACIN 5 MG/ML
750 INJECTION, SOLUTION INTRAVENOUS EVERY 24 HOURS
Status: DISCONTINUED | OUTPATIENT
Start: 2018-02-02 | End: 2018-02-03

## 2018-02-02 RX ADMIN — LOSARTAN POTASSIUM 50 MG: 50 TABLET ORAL at 09:03

## 2018-02-02 RX ADMIN — Medication 10 ML: at 21:17

## 2018-02-02 RX ADMIN — Medication 5 ML: at 10:00

## 2018-02-02 RX ADMIN — ACETAMINOPHEN 650 MG: 325 TABLET, FILM COATED ORAL at 01:42

## 2018-02-02 RX ADMIN — VANCOMYCIN HYDROCHLORIDE 2000 MG: 10 INJECTION, POWDER, LYOPHILIZED, FOR SOLUTION INTRAVENOUS at 22:59

## 2018-02-02 RX ADMIN — Medication 1 CAPSULE: at 10:41

## 2018-02-02 RX ADMIN — ACETAMINOPHEN 650 MG: 325 TABLET, FILM COATED ORAL at 21:16

## 2018-02-02 RX ADMIN — SENNOSIDES 8.6 MG: 8.6 TABLET, FILM COATED ORAL at 10:41

## 2018-02-02 RX ADMIN — TRIAMTERENE AND HYDROCHLOROTHIAZIDE 1 TABLET: 37.5; 25 TABLET ORAL at 09:03

## 2018-02-02 RX ADMIN — LEVOFLOXACIN 750 MG: 5 INJECTION, SOLUTION INTRAVENOUS at 09:02

## 2018-02-02 RX ADMIN — ACETAMINOPHEN 650 MG: 325 TABLET, FILM COATED ORAL at 12:00

## 2018-02-02 RX ADMIN — LOVASTATIN 40 MG: 20 TABLET ORAL at 09:05

## 2018-02-02 RX ADMIN — Medication 10 ML: at 06:00

## 2018-02-02 RX ADMIN — MONTELUKAST SODIUM 10 MG: 10 TABLET, FILM COATED ORAL at 21:16

## 2018-02-02 RX ADMIN — ASPIRIN 81 MG 81 MG: 81 TABLET ORAL at 09:03

## 2018-02-02 RX ADMIN — ENOXAPARIN SODIUM 40 MG: 40 INJECTION SUBCUTANEOUS at 10:41

## 2018-02-02 RX ADMIN — VITAMIN D 1000 UNITS: 25 TAB ORAL at 09:03

## 2018-02-02 RX ADMIN — FUROSEMIDE 20 MG: 20 TABLET ORAL at 09:03

## 2018-02-02 RX ADMIN — POTASSIUM CHLORIDE 40 MEQ: 750 TABLET, FILM COATED, EXTENDED RELEASE ORAL at 09:03

## 2018-02-02 NOTE — PROGRESS NOTES
Bedside and Verbal shift change report given to [address] [place] [address] Ivett (oncoming nurse) by Tahira Latham (offgoing nurse). Report included the following information SBAR, Kardex, ED Summary, Intake/Output, MAR and Recent Results.

## 2018-02-03 LAB
ANION GAP SERPL CALC-SCNC: 9 MMOL/L (ref 5–15)
BASOPHILS # BLD: 0.1 K/UL (ref 0–0.1)
BASOPHILS NFR BLD: 1 % (ref 0–1)
BUN SERPL-MCNC: 18 MG/DL (ref 6–20)
BUN/CREAT SERPL: 20 (ref 12–20)
CALCIUM SERPL-MCNC: 9 MG/DL (ref 8.5–10.1)
CHLORIDE SERPL-SCNC: 101 MMOL/L (ref 97–108)
CO2 SERPL-SCNC: 30 MMOL/L (ref 21–32)
CREAT SERPL-MCNC: 0.9 MG/DL (ref 0.55–1.02)
DATE LAST DOSE: NORMAL
DIFFERENTIAL METHOD BLD: ABNORMAL
EOSINOPHIL # BLD: 0.3 K/UL (ref 0–0.4)
EOSINOPHIL NFR BLD: 3 % (ref 0–7)
ERYTHROCYTE [DISTWIDTH] IN BLOOD BY AUTOMATED COUNT: 14.5 % (ref 11.5–14.5)
GLUCOSE SERPL-MCNC: 109 MG/DL (ref 65–100)
HCT VFR BLD AUTO: 39.3 % (ref 35–47)
HGB BLD-MCNC: 12.8 G/DL (ref 11.5–16)
IMM GRANULOCYTES # BLD: 0.1 K/UL (ref 0–0.04)
IMM GRANULOCYTES NFR BLD AUTO: 0 % (ref 0–0.5)
LYMPHOCYTES # BLD: 1.8 K/UL (ref 0.8–3.5)
LYMPHOCYTES NFR BLD: 14 % (ref 12–49)
MCH RBC QN AUTO: 29.1 PG (ref 26–34)
MCHC RBC AUTO-ENTMCNC: 32.6 G/DL (ref 30–36.5)
MCV RBC AUTO: 89.3 FL (ref 80–99)
MONOCYTES # BLD: 1.3 K/UL (ref 0–1)
MONOCYTES NFR BLD: 10 % (ref 5–13)
NEUTS SEG # BLD: 9.2 K/UL (ref 1.8–8)
NEUTS SEG NFR BLD: 72 % (ref 32–75)
NRBC # BLD: 0 K/UL (ref 0–0.01)
NRBC BLD-RTO: 0 PER 100 WBC
PLATELET # BLD AUTO: 297 K/UL (ref 150–400)
PMV BLD AUTO: 10.1 FL (ref 8.9–12.9)
POTASSIUM SERPL-SCNC: 3.4 MMOL/L (ref 3.5–5.1)
RBC # BLD AUTO: 4.4 M/UL (ref 3.8–5.2)
REPORTED DOSE,DOSE: NORMAL UNITS
REPORTED DOSE/TIME,TMG: NORMAL
SODIUM SERPL-SCNC: 140 MMOL/L (ref 136–145)
VANCOMYCIN TROUGH SERPL-MCNC: 6.5 UG/ML (ref 5–10)
WBC # BLD AUTO: 12.8 K/UL (ref 3.6–11)

## 2018-02-03 PROCEDURE — 80202 ASSAY OF VANCOMYCIN: CPT | Performed by: FAMILY MEDICINE

## 2018-02-03 PROCEDURE — 65210000002 HC RM PRIVATE GYN

## 2018-02-03 PROCEDURE — 74011250637 HC RX REV CODE- 250/637: Performed by: FAMILY MEDICINE

## 2018-02-03 PROCEDURE — 74011250636 HC RX REV CODE- 250/636: Performed by: HOSPITALIST

## 2018-02-03 PROCEDURE — 74011250637 HC RX REV CODE- 250/637: Performed by: HOSPITALIST

## 2018-02-03 PROCEDURE — 80048 BASIC METABOLIC PNL TOTAL CA: CPT | Performed by: FAMILY MEDICINE

## 2018-02-03 PROCEDURE — 85025 COMPLETE CBC W/AUTO DIFF WBC: CPT | Performed by: HOSPITALIST

## 2018-02-03 PROCEDURE — 36415 COLL VENOUS BLD VENIPUNCTURE: CPT | Performed by: FAMILY MEDICINE

## 2018-02-03 RX ORDER — LEVOFLOXACIN 750 MG/1
750 TABLET ORAL DAILY
Status: DISCONTINUED | OUTPATIENT
Start: 2018-02-04 | End: 2018-02-05 | Stop reason: HOSPADM

## 2018-02-03 RX ORDER — POTASSIUM CHLORIDE 750 MG/1
40 TABLET, FILM COATED, EXTENDED RELEASE ORAL
Status: COMPLETED | OUTPATIENT
Start: 2018-02-03 | End: 2018-02-03

## 2018-02-03 RX ADMIN — POTASSIUM CHLORIDE 40 MEQ: 750 TABLET, EXTENDED RELEASE ORAL at 09:14

## 2018-02-03 RX ADMIN — ENOXAPARIN SODIUM 40 MG: 40 INJECTION SUBCUTANEOUS at 10:42

## 2018-02-03 RX ADMIN — LEVOFLOXACIN 750 MG: 750 TABLET, FILM COATED ORAL at 13:51

## 2018-02-03 RX ADMIN — VITAMIN D 1000 UNITS: 25 TAB ORAL at 09:14

## 2018-02-03 RX ADMIN — FUROSEMIDE 20 MG: 20 TABLET ORAL at 09:14

## 2018-02-03 RX ADMIN — MONTELUKAST SODIUM 10 MG: 10 TABLET, FILM COATED ORAL at 21:10

## 2018-02-03 RX ADMIN — ASPIRIN 81 MG 81 MG: 81 TABLET ORAL at 09:13

## 2018-02-03 RX ADMIN — Medication 10 ML: at 14:00

## 2018-02-03 RX ADMIN — LOSARTAN POTASSIUM 50 MG: 50 TABLET ORAL at 09:17

## 2018-02-03 RX ADMIN — Medication 10 ML: at 07:45

## 2018-02-03 RX ADMIN — Medication 10 ML: at 21:10

## 2018-02-03 RX ADMIN — ACETAMINOPHEN 650 MG: 325 TABLET, FILM COATED ORAL at 17:56

## 2018-02-03 RX ADMIN — Medication 1 CAPSULE: at 09:14

## 2018-02-03 RX ADMIN — LEVOFLOXACIN 750 MG: 5 INJECTION, SOLUTION INTRAVENOUS at 07:45

## 2018-02-03 RX ADMIN — TRIAMTERENE AND HYDROCHLOROTHIAZIDE 1 TABLET: 37.5; 25 TABLET ORAL at 09:14

## 2018-02-03 RX ADMIN — ACETAMINOPHEN 650 MG: 325 TABLET, FILM COATED ORAL at 09:13

## 2018-02-03 RX ADMIN — LOVASTATIN 40 MG: 20 TABLET ORAL at 09:13

## 2018-02-03 RX ADMIN — SENNOSIDES 8.6 MG: 8.6 TABLET, FILM COATED ORAL at 09:14

## 2018-02-03 NOTE — ROUTINE PROCESS
Bedside shift change report given to alice helm rn (oncoming nurse) by Pantera Penaelor (offgoing nurse). Report included the following information SBAR and Kardex.

## 2018-02-03 NOTE — PROGRESS NOTES
Hospitalist Progress Note  Lily Walker MD  Answering service: [phone] OR 36 from in house phone  Cell: [phone]      Date of Service:  2/3/2018  NAME:  Glenad Lucero  :  1955  MRN:  [id number]      Admission Summary:     A 63-year-old white female with past medical history of arthritis, asthma, COPD, gallstones, hypertension, hyperlipidemia, irritable bowel syndrome, morbid obesity, psoriasis, sleep apnea who presented to the emergency department from home with chief complaint of fever, right leg pain, swelling and redness. Patient was admitted for cellulitis of the right lower extremity. Interval history / Subjective:       F/u for RLE cellulitis. No acute overnight events. No new complaints. Assessment & Plan:     Right lower extremity cellulitis with Sepsis:POA, d/t bilateral plantar foot calluses. Meets criteria for sepsis- fever, leucocytosis and tachycardia)  Currently on Vancomycin-pharmacy to dose  Erythema is fading  Continue levaquin- 2/2 till date and vancomycin- 2/ till date  Leucocytosis trending down. Repeat CBC  Consult podiatry     Essential Hypertension: Continue Losartan and maxzide  BP is stable    Obesity BMI 57.91    Constipation; Senna daily  Resolved    Hypokalemia: replete PRN    Code status: Full  DVT prophylaxis:Start lovenox    Care Plan discussed with: Patient/Family and Nurse  Disposition: TBD     Hospital Problems  Date Reviewed: 2018          Codes Class Noted POA    * (Principal)Cellulitis of right lower extremity ICD-10-CM: L03.115  ICD-9-CM: 682.6  2018 Unknown                Review of Systems:   Pertinent items are noted in HPI. Vital Signs:    Last 24hrs VS reviewed since prior progress note.  Most recent are:  Visit Vitals    /82 (BP 1 Location: Left arm, BP Patient Position: At rest)    Pulse 84    Temp 98.3 °F (36.8 °C)    Resp 18    Ht 5' (1.524 m)    Vivian 134.5 kg (296 lb 8 oz)    SpO2 95%    BMI 57.91 kg/m2         Intake/Output Summary (Last 24 hours) at 02/03/18 3599  Last data filed at 02/03/18 0915   Gross per 24 hour   Intake              480 ml   Output                0 ml   Net              480 ml        Physical Examination:             Constitutional:  No acute distress, cooperative, pleasant . obese   ENT:  Oral mucous moist, oropharynx benign. Neck supple,    Resp:  CTA bilaterally. No wheezing/rhonchi/rales. No accessory muscle use   CV:  Regular rhythm, normal rate, no murmurs, gallops, rubs    GI:  Soft, non distended, non tender. normoactive bowel sounds, no hepatosplenomegaly     Musculoskeletal:  No edema, warm, 2+ pulses throughout    Neurologic:  Moves all extremities. AAOx3, CN II-XII reviewed    Integument: Right erythema on the anterior RLE extending up to the anterior media leg- Erythema fading        Data Review:          Labs:     Recent Labs      02/02/18   0245  02/01/18   0458   WBC  17.2*  25.3*   HGB  12.9  13.2   HCT  39.3  40.3   PLT  279  290     Recent Labs      02/03/18   0403  02/02/18   0245  02/01/18   0458   NA  140  138  139   K  3.4*  3.0*  3.3*   CL  101  101  103   CO2  30  31  26   BUN  18  17  16   CREA  0.90  0.96  0.87   GLU  109*  126*  107*   CA  9.0  9.0  8.6     Recent Labs      01/31/18   2203   SGOT  16   ALT  26   AP  105   TBILI  0.7   TP  7.5   ALB  3.0*   GLOB  4.5*     No results for input(s): INR, PTP, APTT in the last 72 hours. No lab exists for component: INREXT, INREXT   No results for input(s): FE, TIBC, PSAT, FERR in the last 72 hours. No results found for: FOL, RBCF   No results for input(s): PH, PCO2, PO2 in the last 72 hours. No results for input(s): CPK, CKNDX, TROIQ in the last 72 hours.     No lab exists for component: CPKMB  Lab Results   Component Value Date/Time    Cholesterol, total 134 02/07/2017 11:08 AM    HDL Cholesterol 46 02/07/2017 11:08 AM    LDL, calculated 67 02/07/2017 11:08 AM    Triglyceride 104 02/07/2017 11:08 AM    CHOL/HDL Ratio 3.7 12/13/2011 05:04 AM     No results found for: [facility]  Lab Results   Component Value Date/Time    Color YELLOW 04/20/2011 05:09 PM    Appearance CLOUDY 04/20/2011 05:09 PM    Specific gravity 1.022 04/20/2011 05:09 PM    pH (UA) 6.5 04/20/2011 05:09 PM    Protein NEGATIVE  04/20/2011 05:09 PM    Glucose NEGATIVE  04/20/2011 05:09 PM    Ketone NEGATIVE  04/20/2011 05:09 PM    Bilirubin NEGATIVE  04/20/2011 05:09 PM    Urobilinogen 0.2 04/20/2011 05:09 PM    Nitrites NEGATIVE  04/20/2011 05:09 PM    Leukocyte Esterase NEGATIVE  04/20/2011 05:09 PM    Epithelial cells 20-50 04/20/2011 05:09 PM    Bacteria 2+ 04/20/2011 05:09 PM    WBC 5-10 04/20/2011 05:09 PM    RBC 0-3 04/20/2011 05:09 PM         Medications Reviewed:     Current Facility-Administered Medications   Medication Dose Route Frequency    Vancomycin, Trough - Please draw IMMEDIATELY PRIOR to starting 2300 dose on Saturday, 2/03   Other ONCE    levoFLOXacin (LEVAQUIN) tablet 750 mg  750 mg Oral ACB    senna (SENOKOT) tablet 8.6 mg  1 Tab Oral DAILY    lactobac ac& pc-s.therm-b.anim (YANICK Q/RISAQUAD)  1 Cap Oral DAILY    enoxaparin (LOVENOX) injection 40 mg  40 mg SubCUTAneous Q24H    sodium chloride (NS) flush 5-10 mL  5-10 mL IntraVENous Q8H    sodium chloride (NS) flush 5-10 mL  5-10 mL IntraVENous PRN    aspirin chewable tablet 81 mg  81 mg Oral DAILY    cholecalciferol (VITAMIN D3) tablet 1,000 Units  1,000 Units Oral DAILY    furosemide (LASIX) tablet 20 mg  20 mg Oral DAILY    losartan (COZAAR) tablet 50 mg  50 mg Oral DAILY    lovastatin (MEVACOR) tablet 40 mg  40 mg Oral DAILY    montelukast (SINGULAIR) tablet 10 mg  10 mg Oral QHS    triamterene-hydroCHLOROthiazide (MAXZIDE) 37.5-25 mg per tablet 1 Tab  1 Tab Oral DAILY    Vancomycin Pharmacy to Dose   Other Rx Dosing/Monitoring    vancomycin (VANCOCIN) 2000 mg in  ml infusion  2,000 mg IntraVENous Q24H    acetaminophen (TYLENOL) tablet 650 mg  650 mg Oral Q6H PRN     ______________________________________________________________________  EXPECTED LENGTH OF STAY: 3d 7h  ACTUAL LENGTH OF STAY:          2                 Margaret Hawkins MD

## 2018-02-04 LAB
ANION GAP SERPL CALC-SCNC: 11 MMOL/L (ref 5–15)
BUN SERPL-MCNC: 25 MG/DL (ref 6–20)
BUN/CREAT SERPL: 29 (ref 12–20)
CALCIUM SERPL-MCNC: 9 MG/DL (ref 8.5–10.1)
CHLORIDE SERPL-SCNC: 99 MMOL/L (ref 97–108)
CO2 SERPL-SCNC: 28 MMOL/L (ref 21–32)
CREAT SERPL-MCNC: 0.86 MG/DL (ref 0.55–1.02)
GLUCOSE SERPL-MCNC: 83 MG/DL (ref 65–100)
POTASSIUM SERPL-SCNC: 4.2 MMOL/L (ref 3.5–5.1)
SODIUM SERPL-SCNC: 138 MMOL/L (ref 136–145)

## 2018-02-04 PROCEDURE — 36415 COLL VENOUS BLD VENIPUNCTURE: CPT | Performed by: FAMILY MEDICINE

## 2018-02-04 PROCEDURE — 74011250636 HC RX REV CODE- 250/636: Performed by: FAMILY MEDICINE

## 2018-02-04 PROCEDURE — 80048 BASIC METABOLIC PNL TOTAL CA: CPT | Performed by: FAMILY MEDICINE

## 2018-02-04 PROCEDURE — 74011250637 HC RX REV CODE- 250/637: Performed by: HOSPITALIST

## 2018-02-04 PROCEDURE — 74011250636 HC RX REV CODE- 250/636: Performed by: HOSPITALIST

## 2018-02-04 PROCEDURE — 65210000002 HC RM PRIVATE GYN

## 2018-02-04 PROCEDURE — 74011250637 HC RX REV CODE- 250/637: Performed by: FAMILY MEDICINE

## 2018-02-04 RX ORDER — VANCOMYCIN 2 GRAM/500 ML IN 0.9 % SODIUM CHLORIDE INTRAVENOUS
2000
Status: DISCONTINUED | OUTPATIENT
Start: 2018-02-04 | End: 2018-02-05 | Stop reason: HOSPADM

## 2018-02-04 RX ADMIN — LOSARTAN POTASSIUM 50 MG: 50 TABLET ORAL at 08:44

## 2018-02-04 RX ADMIN — LEVOFLOXACIN 750 MG: 750 TABLET, FILM COATED ORAL at 07:19

## 2018-02-04 RX ADMIN — MONTELUKAST SODIUM 10 MG: 10 TABLET, FILM COATED ORAL at 22:11

## 2018-02-04 RX ADMIN — ACETAMINOPHEN 650 MG: 325 TABLET, FILM COATED ORAL at 23:18

## 2018-02-04 RX ADMIN — LOVASTATIN 40 MG: 20 TABLET ORAL at 08:49

## 2018-02-04 RX ADMIN — ACETAMINOPHEN 650 MG: 325 TABLET, FILM COATED ORAL at 07:24

## 2018-02-04 RX ADMIN — Medication 10 ML: at 22:12

## 2018-02-04 RX ADMIN — ACETAMINOPHEN 650 MG: 325 TABLET, FILM COATED ORAL at 00:01

## 2018-02-04 RX ADMIN — ASPIRIN 81 MG 81 MG: 81 TABLET ORAL at 08:44

## 2018-02-04 RX ADMIN — VITAMIN D 1000 UNITS: 25 TAB ORAL at 08:45

## 2018-02-04 RX ADMIN — Medication 10 ML: at 13:53

## 2018-02-04 RX ADMIN — Medication 1 CAPSULE: at 08:45

## 2018-02-04 RX ADMIN — ACETAMINOPHEN 650 MG: 325 TABLET, FILM COATED ORAL at 17:05

## 2018-02-04 RX ADMIN — SENNOSIDES 8.6 MG: 8.6 TABLET, FILM COATED ORAL at 08:45

## 2018-02-04 RX ADMIN — VANCOMYCIN HYDROCHLORIDE 2000 MG: 10 INJECTION, POWDER, LYOPHILIZED, FOR SOLUTION INTRAVENOUS at 00:03

## 2018-02-04 RX ADMIN — VANCOMYCIN HYDROCHLORIDE 2000 MG: 10 INJECTION, POWDER, LYOPHILIZED, FOR SOLUTION INTRAVENOUS at 17:05

## 2018-02-04 RX ADMIN — FUROSEMIDE 20 MG: 20 TABLET ORAL at 08:45

## 2018-02-04 RX ADMIN — ENOXAPARIN SODIUM 40 MG: 40 INJECTION SUBCUTANEOUS at 11:45

## 2018-02-04 RX ADMIN — Medication 10 ML: at 07:19

## 2018-02-04 RX ADMIN — TRIAMTERENE AND HYDROCHLOROTHIAZIDE 1 TABLET: 37.5; 25 TABLET ORAL at 08:44

## 2018-02-04 NOTE — PROGRESS NOTES
Bedside and Verbal shift change report given to Twan Sauceda (oncoming nurse) by Grcielda Ortega (offgoing nurse). Report included the following information SBAR, Kardex, ED Summary, Procedure Summary, Intake/Output, MAR and Recent Results.

## 2018-02-04 NOTE — PROGRESS NOTES
Pharmacist Note - Vancomycin Dosing  Therapy day 5  Indication:  leg cellulitis  Current regimen:  2000 mg IV Q24H    A Trough Level resulted at 6.5 mcg/mL which was obtained 24 hrs post-dose. Goal trough: 10 - 15 mcg/mL     Plan: Change to vancomycin 2g Iv Q18h for a predicted trough of ~11 mcg/mL . Pharmacy will continue to monitor this patient daily for changes in clinical status and renal function.

## 2018-02-04 NOTE — PROGRESS NOTES
Hospitalist Progress Note  Reyes Palomino MD  Answering service: [phone] OR 36 from in house phone  Cell: [phone]      Date of Service:  2018  NAME:  Sofie Leal  :  1955  MRN:  [id number]      Admission Summary:     A 68-year-old white female with past medical history of arthritis, asthma, COPD, gallstones, hypertension, hyperlipidemia, irritable bowel syndrome, morbid obesity, psoriasis, sleep apnea who presented to the emergency department from home with chief complaint of fever, right leg pain, swelling and redness. Patient was admitted for cellulitis of the right lower extremity. Interval history / Subjective:       F/u for RLE cellulitis. No acute overnight events. No new complaints. Assessment & Plan:     Right lower extremity cellulitis with Sepsis:POA, d/t bilateral plantar foot calluses. Meets criteria for sepsis- fever, leucocytosis and tachycardia)  Currently on Vancomycin-pharmacy to dose  Erythema is fading  Continue levaquin- 2/2 till date and vancomycin- 2/ till date  Leucocytosis continues to trend down   Consult podiatry     Essential Hypertension: Continue Losartan and maxzide  BP is stable    Obesity BMI 57.91    Constipation; Senna daily  Resolved    Hypokalemia: replete PRN    Code status: Full  DVT prophylaxis:Start lovenox    Care Plan discussed with: Patient/Family and Nurse  Disposition: TBD, Likely d/c home tomorrow. Hospital Problems  Date Reviewed: 2018          Codes Class Noted POA    * (Principal)Cellulitis of right lower extremity ICD-10-CM: L03.115  ICD-9-CM: 682.6  2018 Unknown                Review of Systems:   Pertinent items are noted in HPI. Vital Signs:    Last 24hrs VS reviewed since prior progress note.  Most recent are:  Visit Vitals    /87 (BP 1 Location: Left arm, BP Patient Position: At rest)    Pulse 82    Temp 98 °F (36.7 °C)    Resp 16  Ht 5' (1.524 m)    Wt 134.5 kg (296 lb 8 oz)    SpO2 94%    BMI 57.91 kg/m2         Intake/Output Summary (Last 24 hours) at 02/04/18 8355  Last data filed at 02/04/18 0071   Gross per 24 hour   Intake              720 ml   Output              850 ml   Net             -130 ml        Physical Examination:             Constitutional:  No acute distress, cooperative, pleasant . obese   ENT:  Oral mucous moist, oropharynx benign. Neck supple,    Resp:  CTA bilaterally. No wheezing/rhonchi/rales. No accessory muscle use   CV:  Regular rhythm, normal rate, no murmurs, gallops, rubs    GI:  Soft, non distended, non tender. normoactive bowel sounds, no hepatosplenomegaly     Musculoskeletal:  No edema, warm, 2+ pulses throughout    Neurologic:  Moves all extremities. AAOx3, CN II-XII reviewed    Integument: Right erythema on the anterior RLE extending up to the anterior media leg- Erythema fading        Data Review:          Labs:     Recent Labs      02/03/18   1004  02/02/18   0245   WBC  12.8*  17.2*   HGB  12.8  12.9   HCT  39.3  39.3   PLT  297  279     Recent Labs      02/04/18   0423  02/03/18   0403  02/02/18   0245   NA  138  140  138   K  4.2  3.4*  3.0*   CL  99  101  101   CO2  28  30  31   BUN  25*  18  17   CREA  0.86  0.90  0.96   GLU  83  109*  126*   CA  9.0  9.0  9.0     No results for input(s): SGOT, GPT, ALT, AP, TBIL, TBILI, TP, ALB, GLOB, GGT, AML, LPSE in the last 72 hours. No lab exists for component: AMYP, HLPSE  No results for input(s): INR, PTP, APTT in the last 72 hours. No lab exists for component: INREXT, INREXT   No results for input(s): FE, TIBC, PSAT, FERR in the last 72 hours. No results found for: FOL, RBCF   No results for input(s): PH, PCO2, PO2 in the last 72 hours. No results for input(s): CPK, CKNDX, TROIQ in the last 72 hours.     No lab exists for component: CPKMB  Lab Results   Component Value Date/Time    Cholesterol, total 134 02/07/2017 11:08 AM    HDL Cholesterol 46 02/07/2017 11:08 AM    LDL, calculated 67 02/07/2017 11:08 AM    Triglyceride 104 02/07/2017 11:08 AM    CHOL/HDL Ratio 3.7 12/13/2011 05:04 AM     No results found for: [facility]  Lab Results   Component Value Date/Time    Color YELLOW 04/20/2011 05:09 PM    Appearance CLOUDY 04/20/2011 05:09 PM    Specific gravity 1.022 04/20/2011 05:09 PM    pH (UA) 6.5 04/20/2011 05:09 PM    Protein NEGATIVE  04/20/2011 05:09 PM    Glucose NEGATIVE  04/20/2011 05:09 PM    Ketone NEGATIVE  04/20/2011 05:09 PM    Bilirubin NEGATIVE  04/20/2011 05:09 PM    Urobilinogen 0.2 04/20/2011 05:09 PM    Nitrites NEGATIVE  04/20/2011 05:09 PM    Leukocyte Esterase NEGATIVE  04/20/2011 05:09 PM    Epithelial cells 20-50 04/20/2011 05:09 PM    Bacteria 2+ 04/20/2011 05:09 PM    WBC 5-10 04/20/2011 05:09 PM    RBC 0-3 04/20/2011 05:09 PM         Medications Reviewed:     Current Facility-Administered Medications   Medication Dose Route Frequency    vancomycin (VANCOCIN) 2000 mg in  ml infusion  2,000 mg IntraVENous Q18H    levoFLOXacin (LEVAQUIN) tablet 750 mg  750 mg Oral DAILY    senna (SENOKOT) tablet 8.6 mg  1 Tab Oral DAILY    lactobac ac& pc-s.therm-b.anim (YANICK Q/RISAQUAD)  1 Cap Oral DAILY    enoxaparin (LOVENOX) injection 40 mg  40 mg SubCUTAneous Q24H    sodium chloride (NS) flush 5-10 mL  5-10 mL IntraVENous Q8H    sodium chloride (NS) flush 5-10 mL  5-10 mL IntraVENous PRN    aspirin chewable tablet 81 mg  81 mg Oral DAILY    cholecalciferol (VITAMIN D3) tablet 1,000 Units  1,000 Units Oral DAILY    furosemide (LASIX) tablet 20 mg  20 mg Oral DAILY    losartan (COZAAR) tablet 50 mg  50 mg Oral DAILY    lovastatin (MEVACOR) tablet 40 mg  40 mg Oral DAILY    montelukast (SINGULAIR) tablet 10 mg  10 mg Oral QHS    triamterene-hydroCHLOROthiazide (MAXZIDE) 37.5-25 mg per tablet 1 Tab  1 Tab Oral DAILY    Vancomycin Pharmacy to Dose   Other Rx Dosing/Monitoring    acetaminophen (TYLENOL) tablet 650 mg 650 mg Oral Q6H PRN     ______________________________________________________________________  EXPECTED LENGTH OF STAY: 3d 7h  ACTUAL LENGTH OF STAY:          3                 Joshua Terrell MD

## 2018-02-04 NOTE — PROGRESS NOTES
Podiatry consult MD Rafael Gary called back; MD on call with be in 2/5/18 in the morning to see pt for consult.

## 2018-02-05 ENCOUNTER — PATIENT OUTREACH (OUTPATIENT)
Dept: OTHER | Age: 63
End: 2018-02-05

## 2018-02-05 VITALS
DIASTOLIC BLOOD PRESSURE: 78 MMHG | TEMPERATURE: 98 F | RESPIRATION RATE: 18 BRPM | WEIGHT: 293 LBS | SYSTOLIC BLOOD PRESSURE: 144 MMHG | BODY MASS INDEX: 57.52 KG/M2 | OXYGEN SATURATION: 96 % | HEART RATE: 80 BPM | HEIGHT: 60 IN

## 2018-02-05 LAB
ANION GAP SERPL CALC-SCNC: 4 MMOL/L (ref 5–15)
BACTERIA SPEC CULT: NORMAL
BASOPHILS # BLD: 0.1 K/UL (ref 0–0.1)
BASOPHILS NFR BLD: 1 % (ref 0–1)
BUN SERPL-MCNC: 27 MG/DL (ref 6–20)
BUN/CREAT SERPL: 29 (ref 12–20)
CALCIUM SERPL-MCNC: 9.1 MG/DL (ref 8.5–10.1)
CHLORIDE SERPL-SCNC: 101 MMOL/L (ref 97–108)
CO2 SERPL-SCNC: 33 MMOL/L (ref 21–32)
CREAT SERPL-MCNC: 0.93 MG/DL (ref 0.55–1.02)
DIFFERENTIAL METHOD BLD: ABNORMAL
EOSINOPHIL # BLD: 0.5 K/UL (ref 0–0.4)
EOSINOPHIL NFR BLD: 5 % (ref 0–7)
ERYTHROCYTE [DISTWIDTH] IN BLOOD BY AUTOMATED COUNT: 14.5 % (ref 11.5–14.5)
GLUCOSE SERPL-MCNC: 90 MG/DL (ref 65–100)
HCT VFR BLD AUTO: 38.9 % (ref 35–47)
HGB BLD-MCNC: 12.5 G/DL (ref 11.5–16)
IMM GRANULOCYTES # BLD: 0.1 K/UL (ref 0–0.04)
IMM GRANULOCYTES NFR BLD AUTO: 1 % (ref 0–0.5)
LYMPHOCYTES # BLD: 2.3 K/UL (ref 0.8–3.5)
LYMPHOCYTES NFR BLD: 24 % (ref 12–49)
MCH RBC QN AUTO: 29.1 PG (ref 26–34)
MCHC RBC AUTO-ENTMCNC: 32.1 G/DL (ref 30–36.5)
MCV RBC AUTO: 90.5 FL (ref 80–99)
MONOCYTES # BLD: 1.2 K/UL (ref 0–1)
MONOCYTES NFR BLD: 13 % (ref 5–13)
NEUTS SEG # BLD: 5.3 K/UL (ref 1.8–8)
NEUTS SEG NFR BLD: 56 % (ref 32–75)
NRBC # BLD: 0 K/UL (ref 0–0.01)
NRBC BLD-RTO: 0 PER 100 WBC
PLATELET # BLD AUTO: 282 K/UL (ref 150–400)
PMV BLD AUTO: 9.6 FL (ref 8.9–12.9)
POTASSIUM SERPL-SCNC: 3.2 MMOL/L (ref 3.5–5.1)
RBC # BLD AUTO: 4.3 M/UL (ref 3.8–5.2)
SERVICE CMNT-IMP: NORMAL
SODIUM SERPL-SCNC: 138 MMOL/L (ref 136–145)
WBC # BLD AUTO: 9.5 K/UL (ref 3.6–11)

## 2018-02-05 PROCEDURE — 74011250637 HC RX REV CODE- 250/637: Performed by: HOSPITALIST

## 2018-02-05 PROCEDURE — 74011250637 HC RX REV CODE- 250/637: Performed by: FAMILY MEDICINE

## 2018-02-05 PROCEDURE — 74011250636 HC RX REV CODE- 250/636: Performed by: HOSPITALIST

## 2018-02-05 PROCEDURE — 74011250636 HC RX REV CODE- 250/636: Performed by: FAMILY MEDICINE

## 2018-02-05 PROCEDURE — 36415 COLL VENOUS BLD VENIPUNCTURE: CPT | Performed by: HOSPITALIST

## 2018-02-05 PROCEDURE — 85025 COMPLETE CBC W/AUTO DIFF WBC: CPT | Performed by: HOSPITALIST

## 2018-02-05 PROCEDURE — 80048 BASIC METABOLIC PNL TOTAL CA: CPT | Performed by: HOSPITALIST

## 2018-02-05 RX ORDER — FUROSEMIDE 20 MG/1
20 TABLET ORAL DAILY
Qty: 30 TAB | Refills: 0 | Status: SHIPPED | OUTPATIENT
Start: 2018-02-05 | End: 2018-03-01 | Stop reason: SDUPTHER

## 2018-02-05 RX ORDER — LEVOFLOXACIN 750 MG/1
750 TABLET ORAL DAILY
Qty: 3 TAB | Refills: 0 | Status: SHIPPED | OUTPATIENT
Start: 2018-02-05 | End: 2018-02-05

## 2018-02-05 RX ORDER — POTASSIUM CHLORIDE 750 MG/1
40 TABLET, FILM COATED, EXTENDED RELEASE ORAL
Status: COMPLETED | OUTPATIENT
Start: 2018-02-05 | End: 2018-02-05

## 2018-02-05 RX ORDER — CLINDAMYCIN HYDROCHLORIDE 300 MG/1
300 CAPSULE ORAL 3 TIMES DAILY
Qty: 21 CAP | Refills: 0 | Status: SHIPPED | OUTPATIENT
Start: 2018-02-05 | End: 2018-02-12 | Stop reason: SDUPTHER

## 2018-02-05 RX ORDER — LEVOFLOXACIN 750 MG/1
750 TABLET ORAL DAILY
Qty: 7 TAB | Refills: 0 | Status: SHIPPED | OUTPATIENT
Start: 2018-02-05 | End: 2018-02-12

## 2018-02-05 RX ADMIN — ACETAMINOPHEN 650 MG: 325 TABLET, FILM COATED ORAL at 08:30

## 2018-02-05 RX ADMIN — ENOXAPARIN SODIUM 40 MG: 40 INJECTION SUBCUTANEOUS at 11:41

## 2018-02-05 RX ADMIN — ASPIRIN 81 MG 81 MG: 81 TABLET ORAL at 08:29

## 2018-02-05 RX ADMIN — TRIAMTERENE AND HYDROCHLOROTHIAZIDE 1 TABLET: 37.5; 25 TABLET ORAL at 08:29

## 2018-02-05 RX ADMIN — Medication 10 ML: at 07:45

## 2018-02-05 RX ADMIN — SENNOSIDES 8.6 MG: 8.6 TABLET, FILM COATED ORAL at 08:29

## 2018-02-05 RX ADMIN — POTASSIUM CHLORIDE 40 MEQ: 750 TABLET, FILM COATED, EXTENDED RELEASE ORAL at 08:28

## 2018-02-05 RX ADMIN — VANCOMYCIN HYDROCHLORIDE 2000 MG: 10 INJECTION, POWDER, LYOPHILIZED, FOR SOLUTION INTRAVENOUS at 11:42

## 2018-02-05 RX ADMIN — LOSARTAN POTASSIUM 50 MG: 50 TABLET ORAL at 08:30

## 2018-02-05 RX ADMIN — Medication 1 CAPSULE: at 08:29

## 2018-02-05 RX ADMIN — LOVASTATIN 40 MG: 20 TABLET ORAL at 08:35

## 2018-02-05 RX ADMIN — LEVOFLOXACIN 750 MG: 750 TABLET, FILM COATED ORAL at 08:29

## 2018-02-05 RX ADMIN — VITAMIN D 1000 UNITS: 25 TAB ORAL at 08:29

## 2018-02-05 RX ADMIN — FUROSEMIDE 20 MG: 20 TABLET ORAL at 08:30

## 2018-02-05 NOTE — PROGRESS NOTES
Call out to Dr. Benjie Giraldo office r//t consult called over the wkend, staff reports being unsure of MD arrival time to see  Pt, pt d/c pending podiatry consult, message left also on MD's medical assistant voicemail.

## 2018-02-05 NOTE — PROGRESS NOTES
SUKUMAR Call:      Patient discharged today from hospital for inpatient treatment of cellulitis. Initial attempt to contact patient for transitions of care. Left discreet message on voicemail with this CM contact information. Will attempt to contact again tomorrow. 5:30 pm  Incoming call from Ms. Kelly. Verified  and address for HIPAA security. She was discharged today and is glad to be home. * Her daughter and son in law have gotten her prescriptions  * She has showered and feels much better now that she is home. * She understands all discharge instructions/ Red Flags.    - She states she is one two antibiotics/ Levaquin and Cipro. * Infection started at her feet/ with callus cracks harboring bacteria. - She has FU with podiatry and IM on .     - She may require debridement of her callus to ensure no abscessed area hidden by thick callus.     - She is to ambulate periodically to encourage circulation/ DVT prevention.     - No open areas on legs. - Pain is much better and she can ambulate in house shoes. She has two new pair to wear with strong soles. * She plans to drive herself to doctor's appointments and will use  parking assistance. - No driving restrictions in AVS  / and she clarified before leaving. * I will FU with her after her appointments later this week/ Thurs or Friday. - And she has my contact information if needs arise before that time.     FU call:   or         Your Scheduled Appointments            2018  1:40 PM EST   ROUTINE CARE with Jay Trent NP   [facility] [address] [facility] [address]   [phone]                  2018 10:00 AM EST   ROUTINE CARE with Kike Villatoro DO   [facility] [place] [facility]     [address] [facility] [id number] [phone]                    Discharge Orders   None               A check shaji indicates which time of day the medication should be taken.            My Medications       START taking these medications       Instructions Each Dose to Equal   Morning Noon Evening Bedtime     L. acidoph & paracasei- S therm- Bifido 8 billion cell Cap cap   Commonly known as:  YANICK-Q/RISAQUAD        Your last dose was:          Your next dose is:                 Take 1 Cap by mouth daily.      1 Cap                                   levoFLOXacin 750 mg tablet   Commonly known as:  LEVAQUIN        Your last dose was:          Your next dose is:

## 2018-02-05 NOTE — DISCHARGE INSTRUCTIONS
Discharge Instructions       PATIENT ID: Chantel Lorenzana  MRN: [id number]   YOB: 1955    DATE OF ADMISSION: 1/31/2018  9:30 PM    DATE OF DISCHARGE: 2/5/2018    PRIMARY CARE PROVIDER: Fidel Parker DO       DISCHARGING PHYSICIAN: Jay Dos Santos MD    To contact this individual call [phone] and ask the  to page. If unavailable ask to be transferred the Adult Hospitalist Department. DISCHARGE DIAGNOSES : Right lower extremity cellulitis, Sepsis    CONSULTATIONS: IP CONSULT TO HOSPITALIST  IP CONSULT TO PODIATRY    PROCEDURES/SURGERIES: * No surgery found *    PENDING TEST RESULTS:   At the time of discharge the following test results are still pending:     FOLLOW UP APPOINTMENTS:   Follow-up Information     None           ADDITIONAL CARE RECOMMENDATIONS:     DIET: Cardiac Diet    ACTIVITY: Activity as tolerated    WOUND CARE: None    EQUIPMENT needed:       DISCHARGE MEDICATIONS:   See Medication Reconciliation Form    · It is important that you take the medication exactly as they are prescribed. · Keep your medication in the bottles provided by the pharmacist and keep a list of the medication names, dosages, and times to be taken in your wallet. · Do not take other medications without consulting your doctor. NOTIFY YOUR PHYSICIAN FOR ANY OF THE FOLLOWING:   Fever over 101 degrees for 24 hours. Chest pain, shortness of breath, fever, chills, nausea, vomiting, diarrhea, change in mentation, falling, weakness, bleeding. Severe pain or pain not relieved by medications. Or, any other signs or symptoms that you may have questions about. DISPOSITION:   x Home With:   OT  PT  HH  RN       SNF/Inpatient Rehab/LTAC    Independent/assisted living    Hospice    Other:     CDMP Checked:    Yes X       Signed:   Jay Dos Santos MD  2/5/2018  7:45 AM

## 2018-02-05 NOTE — PROGRESS NOTES
I have reviewed discharge instructions with the patient. The patient verbalized understanding with medication and antibiotics. IV was removed.

## 2018-02-05 NOTE — PROGRESS NOTES
RAY met with the patient at bedside to discuss discharge- patient stated she made herself a follow-up appointment with her PCP for Wednesday, 2/7/2018. The patient stated that her son-in-law will be providing transportation upon discharge. No other needs or concerns identified at this time- patient transitioned to PO antibiotics. Patient is discharging today.  SAGE Harris CM met with the patient at bedside to confirm appointment time for follow-up at 1:40 p.m.- information added to the patient's AVS. SAGE Harris

## 2018-02-05 NOTE — DISCHARGE SUMMARY
Discharge Summary       PATIENT ID: Carson Cabrera  MRN: [id number]   YOB: 1955    DATE OF ADMISSION: 1/31/2018  9:30 PM    DATE OF DISCHARGE: 2/5/2018  PRIMARY CARE PROVIDER: Anne Ovetron DO       DISCHARGING PHYSICIAN: Elinor Baig MD    To contact this individual call [phone] and ask the  to page. If unavailable ask to be transferred the Adult Hospitalist Department. CONSULTATIONS: IP CONSULT TO HOSPITALIST  IP CONSULT TO PODIATRY    PROCEDURES/SURGERIES: * No surgery found *    ADMITTING DIAGNOSES & HOSPITAL COURSE:     A 59-year-old white female with past medical history of arthritis, asthma, COPD, gallstones, hypertension, hyperlipidemia, irritable bowel syndrome, morbid obesity, psoriasis, sleep apnea who presented to the emergency department from home with chief complaint of fever, right leg pain, swelling and redness. Patient was admitted for cellulitis of the right lower extremity. DISCHARGE DIAGNOSES / PLAN:      Right lower extremity cellulitis with Sepsis:POA, Portal of entry of organism likely from bilateral plantar foot calluses. Meets criteria for sepsis- fever, leucocytosis and tachycardia)  Currently on Vancomycin-pharmacy to dose  Erythema continues to fade. Continue levaquin- 2/2 for a total duration of 10 days. Patient on Vancomycin but will switch to Clindamycin for MRSA coverage on discharge. Leucocytosis resolved. Add priobitic  F/u with podiatry and PCP within one week of discharge.     Essential Hypertension: Continue Losartan and maxzide  BP is stable     Obesity BMI 57.91     Constipation; Senna daily  Resolved     Hypokalemia: replete PRN     Code status: Full  DVT prophylaxis:Start lovenox     Care Plan discussed with: Patient/Family and Nurse  Disposition prior to discharge: Stable.  Home with Self care       PENDING TEST RESULTS:   At the time of discharge the following test results are still pending:     FOLLOW UP APPOINTMENTS: Follow-up Information     None           ADDITIONAL CARE RECOMMENDATIONS:     DIET: Cardiac Diet    ACTIVITY: Activity as tolerated    WOUND CARE: None    EQUIPMENT needed:       DISCHARGE MEDICATIONS:  Current Discharge Medication List      START taking these medications    Details   L. acidoph & paracasei- S therm- Bifido (YANICK-Q/RISAQUAD) 8 billion cell cap cap Take 1 Cap by mouth daily. Qty: 7 Cap, Refills: 0      levoFLOXacin (LEVAQUIN) 750 mg tablet Take 1 Tab by mouth daily for 3 days. Indications: Skin and Skin Structure Infection, STAPHYLOCOCCUS AUREUS SKIN AND SKIN STRUCTURE INFECTION  Qty: 3 Tab, Refills: 0         CONTINUE these medications which have CHANGED    Details   clindamycin (CLEOCIN) 300 mg capsule Take 1 Cap by mouth three (3) times daily for 7 days. Indications: STAPHYLOCOCCUS AUREUS SKIN AND SKIN STRUCTURE INFECTION  Qty: 21 Cap, Refills: 0    Associated Diagnoses: Cellulitis of left lower extremity         CONTINUE these medications which have NOT CHANGED    Details   furosemide (LASIX) 20 mg tablet Take 1 Tab by mouth daily. Qty: 7 Tab, Refills: 0    Associated Diagnoses: Localized edema      potassium chloride (K-DUR, KLOR-CON) 20 mEq tablet Take 1 Tab by mouth daily. Qty: 30 Tab, Refills: 0    Associated Diagnoses: Hypokalemia      montelukast (SINGULAIR) 10 mg tablet TAKE 1 TABLET BY MOUTH EVERY DAY  Qty: 30 Tab, Refills: 5      losartan (COZAAR) 50 mg tablet TAKE 1 TABLET BY MOUTH DAILY. Qty: 30 Tab, Refills: 5    Associated Diagnoses: Essential hypertension      triamterene-hydroCHLOROthiazide (MAXZIDE) 37.5-25 mg per tablet TAKE 1 TABLET BY MOUTH EVERY DAY  Qty: 30 Tab, Refills: 5    Associated Diagnoses: Essential hypertension      clobetasol (TEMOVATE) 0.05 % topical cream Apply  to affected area two (2) times a day.  FOR PSORASIS  Qty: 1 Tube, Refills: 5    Associated Diagnoses: Dermatitis      ipratropium-albuterol (COMBIVENT)  mcg/actuation inhaler Take 2 Puffs by inhalation every six (6) hours as needed for Wheezing. Qty: 1 Inhaler, Refills: 5    Associated Diagnoses: Uncomplicated asthma, unspecified asthma severity      CHOLECALCIFEROL, VITAMIN D3, (VITAMIN D-3 PO) Take 1,000 mg by mouth daily. aspirin 81 mg chewable tablet Take 81 mg by mouth daily. lovastatin (MEVACOR) 40 mg tablet TAKE 1 TABLET BY MOUTH NIGHTLY  Qty: 30 Tab, Refills: 5    Associated Diagnoses: Pure hypercholesterolemia      metroNIDAZOLE (METROGEL) 1 % topical gel Apply  to affected area daily. Use a thin layer to affected areas after washing  Qty: 45 g, Refills: 5               NOTIFY YOUR PHYSICIAN FOR ANY OF THE FOLLOWING:   Fever over 101 degrees for 24 hours. Chest pain, shortness of breath, fever, chills, nausea, vomiting, diarrhea, change in mentation, falling, weakness, bleeding. Severe pain or pain not relieved by medications. Or, any other signs or symptoms that you may have questions about.     DISPOSITION:  x  Home With:   OT  PT  HH  RN       Long term SNF/Inpatient Rehab    Independent/assisted living    Hospice    Other:       PATIENT CONDITION AT DISCHARGE:     Functional status    Poor     Deconditioned    x Independent      Cognition    x Lucid     Forgetful     Dementia      Catheters/lines (plus indication)    Rivers     PICC     PEG    x None      Code status   x  Full code     DNR      PHYSICAL EXAMINATION AT DISCHARGE:   Refer to Progress Note      CHRONIC MEDICAL DIAGNOSES:  Problem List as of 2/5/2018  Date Reviewed: 2/1/2018          Codes Class Noted - Resolved    * (Principal)Cellulitis of right lower extremity ICD-10-CM: L03.115  ICD-9-CM: 682.6  2/1/2018 - Present        Rosacea ICD-10-CM: L71.9  ICD-9-CM: 695.3  2/7/2017 - Present        Incisional hernia, without obstruction or gangrene ICD-10-CM: K43.2  ICD-9-CM: 553.21  6/9/2016 - Present        COPD (chronic obstructive pulmonary disease) ([facility] ICD-10-CM: J44.9  ICD-9-CM: 845  11/19/2013 - Present S/P hysterectomy ICD-10-CM: Z90.710  ICD-9-CM: V88.01  4/16/2013 - Present        Endometrial hyperplasia with atypia ICD-10-CM: N85.02  ICD-9-CM: 621.33  11/6/2012 - Present        Postmenopausal bleeding ICD-10-CM: N95.0  ICD-9-CM: 627.1  10/18/2012 - Present        Recurrent umbilical hernia [id number]: K42.9  ICD-9-CM: 553.1  8/20/2012 - Present        Mixed hyperlipidemia ICD-10-CM: E78.2  ICD-9-CM: 272.2  6/5/2012 - Present        Tobacco use disorder ICD-10-CM: F17.200  ICD-9-CM: 305.1  6/5/2012 - Present        Morbid obesity ([facility] ICD-10-CM: E66.01  ICD-9-CM: 278.01  6/5/2012 - Present        Essential hypertension, benign ICD-10-CM: I10  ICD-9-CM: 401.1  6/5/2012 - Present        Umbilical hernia [id number]: K42.9  ICD-9-CM: 553.1  2/7/2012 - Present        Angina decubitus ([facility] ICD-10-CM: I20.8  ICD-9-CM: 413.0  12/12/2011 - Present        Acute chest pain ICD-10-CM: R07.9  ICD-9-CM: 786.50  12/12/2011 - Present        HTN (hypertension) (Chronic) ICD-10-CM: I10  ICD-9-CM: 401.9  7/23/2010 - Present        Pure hypercholesterolemia (Chronic) ICD-10-CM: E78.00  ICD-9-CM: 272.0  7/23/2010 - Present        Asthma ICD-10-CM: J45.909  ICD-9-CM: 493.90  7/23/2010 - Present        ADORE (obstructive sleep apnea) ICD-10-CM: G47.33  ICD-9-CM: 327.23  7/23/2010 - Present        IBS (irritable bowel syndrome) ICD-10-CM: K58.9  ICD-9-CM: 564.1  7/23/2010 - Present        RESOLVED: Gallstones ICD-10-CM: K80.20  ICD-9-CM: 574.20  Unknown - 8/20/2012              Greater than 30 minutes were spent with the patient on counseling and coordination of care    Signed:   Mindy Sheppard MD  2/5/2018  9:20 AM

## 2018-02-07 ENCOUNTER — OFFICE VISIT (OUTPATIENT)
Dept: INTERNAL MEDICINE CLINIC | Age: 63
End: 2018-02-07

## 2018-02-07 VITALS
WEIGHT: 291.8 LBS | TEMPERATURE: 97.4 F | HEART RATE: 95 BPM | SYSTOLIC BLOOD PRESSURE: 151 MMHG | BODY MASS INDEX: 57.29 KG/M2 | DIASTOLIC BLOOD PRESSURE: 80 MMHG | OXYGEN SATURATION: 95 % | RESPIRATION RATE: 18 BRPM | HEIGHT: 60 IN

## 2018-02-07 DIAGNOSIS — L03.115 CELLULITIS OF RIGHT LOWER EXTREMITY: Primary | ICD-10-CM

## 2018-02-07 DIAGNOSIS — E66.01 MORBID OBESITY (HCC): ICD-10-CM

## 2018-02-07 DIAGNOSIS — R60.0 LOCALIZED EDEMA: ICD-10-CM

## 2018-02-07 NOTE — PROGRESS NOTES
Subjective:     Chief Complaint   Patient presents with    Follow-up     [address] [place] [address], cellulitus     Hypertension       History of Present Illness    Jesus Manuel Alan is a 63y.o. year old female who is a patient of Dr. Elizabeth Alvarado that presents today for a hospital follow-up. She was recently admitted to [address] [place] [address] for right leg cellulitis. The patient was seen in this office on 1/29/2018 for left leg cellulitis. She was feeling generally well at that time. She was continued on Clindamycin and referred to podiatry for evaluation of callus and dry scaly feet. On 01/31/2018 she began to develop a fever and generalized malaise. She went to the ED and was found to have a white count of 26.7. The cellulitis had spread to her right leg. She was started on IV vancomycin and was admitted for 5 days. Cellulitis is almost resolved in left leg. Right leg is still red and warm but improving. She continues on PO antibiotics. Blood cultures were negative. She denies any other new complaints today. No CP, SOB, GI, or  symptoms. Reviewed medications, recent lab work and imaging with patient. Pt reports compliance with medications. Current Outpatient Prescriptions on File Prior to Visit   Medication Sig Dispense Refill    clindamycin (CLEOCIN) 300 mg capsule Take 1 Cap by mouth three (3) times daily for 7 days. Indications: STAPHYLOCOCCUS AUREUS SKIN AND SKIN STRUCTURE INFECTION 21 Cap 0    L. acidoph & paracasei- S therm- Bifido (YANICK-Q/RISAQUAD) 8 billion cell cap cap Take 1 Cap by mouth daily. 7 Cap 0    levoFLOXacin (LEVAQUIN) 750 mg tablet Take 1 Tab by mouth daily for 7 days. Indications: Skin and Skin Structure Infection 7 Tab 0    furosemide (LASIX) 20 mg tablet Take 1 Tab by mouth daily. 30 Tab 0    furosemide (LASIX) 20 mg tablet Take 1 Tab by mouth daily. 7 Tab 0    potassium chloride (K-DUR, KLOR-CON) 20 mEq tablet Take 1 Tab by mouth daily.  30 Tab 0    lovastatin (MEVACOR) 40 mg tablet TAKE 1 TABLET BY MOUTH NIGHTLY 30 Tab 5    montelukast (SINGULAIR) 10 mg tablet TAKE 1 TABLET BY MOUTH EVERY DAY 30 Tab 5    losartan (COZAAR) 50 mg tablet TAKE 1 TABLET BY MOUTH DAILY. 30 Tab 5    triamterene-hydroCHLOROthiazide (MAXZIDE) 37.5-25 mg per tablet TAKE 1 TABLET BY MOUTH EVERY DAY 30 Tab 5    clobetasol (TEMOVATE) 0.05 % topical cream Apply  to affected area two (2) times a day. FOR PSORASIS 1 Tube 5    metroNIDAZOLE (METROGEL) 1 % topical gel Apply  to affected area daily. Use a thin layer to affected areas after washing 45 g 5    ipratropium-albuterol (COMBIVENT)  mcg/actuation inhaler Take 2 Puffs by inhalation every six (6) hours as needed for Wheezing. 1 Inhaler 5    CHOLECALCIFEROL, VITAMIN D3, (VITAMIN D-3 PO) Take 1,000 mg by mouth daily.  aspirin 81 mg chewable tablet Take 81 mg by mouth daily. No current facility-administered medications on file prior to visit.         Allergies   Allergen Reactions    Codeine Shortness of Breath and Swelling    Pcn [Penicillins] Shortness of Breath and Swelling    E-Mycin E Hives and Nausea and Vomiting    Scopolamine Swelling    Adhesive Tape-Silicones Rash and Swelling      Past Medical History:   Diagnosis Date    Angina decubitus (HCC)     Arthritis     KNEES    Asthma     COPD     Gallstones     H/O seasonal allergies     History of blood transfusion EARLY 1980'S    [facility], NO REACTION    Hypercholesterolemia     Hypertension     IBS (irritable bowel syndrome)     Morbid obesity (HCC)     Nausea & vomiting     WITH ALL 3 SURGERIES    Obesity     Other ill-defined conditions(799.89)     High cholesterol    Psoriasis     Recurrent umbilical hernia 5/56/6378    Sleep apnea     DOESN'T USE CPAP; SLEEPS IN RECLINER FOR 2 YEARS    Unspecified adverse effect of anesthesia     SMALL AIRWAY, ENLARGED TONSILS    URI (upper respiratory infection) 01/2014      Past Surgical History:   Procedure Laterality Date    HX  SECTION  1987    HX CHOLECYSTECTOMY  2011    by Dr Teodoro Lennox Right     2010 - Benign    HX DILATION AND CURETTAGE  10/2012    HX HEART CATHETERIZATION      HX HEENT      WISDOM TEETH EXTRACTION    HX HEENT Bilateral     TAN. TEAR DUCT TUMOR REMOVAL    HX HEENT      ALL TEETH REMOVED    HX HERNIA REPAIR      umbilical hernia repair by Dr Marine Ortega  14    ventral hernia repair by Dr Jhonny Tirado  18    Laparoscopic repair of recurrent incisional hernia with mesh by Dr Clarisa Middleton  2012    Dr Domínguez Severe Left    2 PROCEDURES    ORIF L FOOT, 2ND REPAIR    HX WISDOM TEETH EXTRACTION      KNEE ARTHROSCP HARV      KNEE SCOPE,MED OR LAT MENIS REPAIR Bilateral     TAN 2 SEPARATE SURGERIES SAME YEAR    LAP,CHOLECYSTECTOMY  11      Social History   Substance Use Topics    Smoking status: Current Every Day Smoker     Packs/day: 0.25     Years: 38.00     Types: Cigarettes    Smokeless tobacco: Never Used      Comment: DOWN TO 5 CIGARETTES PER DAY. SMOKED 2 PPD FOR 7 YEARS    Alcohol use No      Family History   Problem Relation Age of Onset    Hypertension Mother     Post-op Nausea/Vomiting Mother     Stroke Mother     Post-op Nausea/Vomiting Daughter     Heart Disease Father     No Known Problems Sister     No Known Problems Sister     No Known Problems Sister         Objective:     Vitals:    18 1347   BP: 151/80   Pulse: 95   Resp: 18   Temp: 97.4 °F (36.3 °C)   TempSrc: Oral   SpO2: 95%   Weight: 291 lb 12.8 oz (132.4 kg)   Height: 5' (1.524 m)       Review of Systems   Constitutional: Negative. HENT: Negative. Eyes: Negative. Respiratory: Negative. Cardiovascular: Positive for leg swelling. Gastrointestinal: Negative. Genitourinary: Negative. Musculoskeletal: Negative.     Skin: Redness   Neurological: Negative. Psychiatric/Behavioral: Negative. Physical Exam   Constitutional: She is oriented to person, place, and time. She appears well-developed and well-nourished. No distress. Well-appearing obese  female. NAD   HENT:   Head: Normocephalic and atraumatic. Eyes: Conjunctivae and EOM are normal. Pupils are equal, round, and reactive to light. Neck: Normal range of motion. Neck supple. Cardiovascular: Normal rate, regular rhythm and normal heart sounds. Pulmonary/Chest: Effort normal and breath sounds normal. No respiratory distress. She has no wheezes. Abdominal: She exhibits no distension. Musculoskeletal: Normal range of motion. She exhibits edema. She exhibits no tenderness or deformity. Feet:   Right Foot:   Skin Integrity: Positive for callus and dry skin. Left Foot:   Skin Integrity: Positive for callus and dry skin. Neurological: She is alert and oriented to person, place, and time. Skin: Skin is warm and dry. She is not diaphoretic. There is erythema. Right lower leg with erythema and edema from ankle to knee. 3+ edema noted   Psychiatric: She has a normal mood and affect. Her behavior is normal.   Nursing note and vitals reviewed. Assessment/ Plan:   Diagnoses and all orders for this visit:    1. Cellulitis of right lower extremity   Continue antibiotics as prescribed. 2. Localized edema    3. Morbid obesity ([address]    Follow-up on Monday. Patient's plan of care has been reviewed with them. Patient and/or family have verbally conveyed their understanding and agreement of the patient's signs, symptoms, diagnosis, treatment and prognosis and additionally agree to follow up as recommended or return to [facility] should their condition change prior to follow-up.   Discharge instructions have also been provided to the patient with some educational information regarding their diagnosis as well a list of reasons why they would want to return to the office prior to their follow-up appointment should their condition change. FMLA paperwork completed.

## 2018-02-07 NOTE — PATIENT INSTRUCTIONS
Cellulitis: Care Instructions  Your Care Instructions    Cellulitis is a skin infection. It often occurs after a break in the skin from a scrape, cut, bite, or puncture, or after a rash. The doctor has checked you carefully, but problems can develop later. If you notice any problems or new symptoms, get medical treatment right away. Follow-up care is a key part of your treatment and safety. Be sure to make and go to all appointments, and call your doctor if you are having problems. It's also a good idea to know your test results and keep a list of the medicines you take. How can you care for yourself at home? · Take your antibiotics as directed. Do not stop taking them just because you feel better. You need to take the full course of antibiotics. · Prop up the infected area on pillows to reduce pain and swelling. Try to keep the area above the level of your heart as often as you can. · If your doctor told you how to care for your wound, follow your doctor's instructions. If you did not get instructions, follow this general advice:  ¨ Wash the wound with clean water 2 times a day. Don't use hydrogen peroxide or alcohol, which can slow healing. ¨ You may cover the wound with a thin layer of petroleum jelly, such as Vaseline, and a nonstick bandage. ¨ Apply more petroleum jelly and replace the bandage as needed. · Be safe with medicines. Take pain medicines exactly as directed. ¨ If the doctor gave you a prescription medicine for pain, take it as prescribed. ¨ If you are not taking a prescription pain medicine, ask your doctor if you can take an over-the-counter medicine. To prevent cellulitis in the future  · Try to prevent cuts, scrapes, or other injuries to your skin. Cellulitis most often occurs where there is a break in the skin. · If you get a scrape, cut, mild burn, or bite, wash the wound with clean water as soon as you can to help avoid infection.  Don't use hydrogen peroxide or alcohol, which can slow healing. · If you have swelling in your legs (edema), support stockings and good skin care may help prevent leg sores and cellulitis. · Take care of your feet, especially if you have diabetes or other conditions that increase the risk of infection. Wear shoes and socks. Do not go barefoot. If you have athlete's foot or other skin problems on your feet, talk to your doctor about how to treat them. When should you call for help? Call your doctor now or seek immediate medical care if:  ? · You have signs that your infection is getting worse, such as:  ¨ Increased pain, swelling, warmth, or redness. ¨ Red streaks leading from the area. ¨ Pus draining from the area. ¨ A fever. ? · You get a rash. ? Watch closely for changes in your health, and be sure to contact your doctor if:  ? · You are not getting better after 1 day (24 hours). ? · You do not get better as expected. Where can you learn more? Go to http://thien-homero.info/. Lul Nichols in the search box to learn more about \"Cellulitis: Care Instructions. \"  Current as of: October 13, 2016  Content Version: 11.4  © 5657-5261 [employer]. Care instructions adapted under license by [employer] (which disclaims liability or warranty for this information). If you have questions about a medical condition or this instruction, always ask your healthcare professional. [place] [address] any warranty or liability for your use of this information. Leg and Ankle Edema: Care Instructions  Your Care Instructions  Swelling in the legs, ankles, and feet is called edema. It is common after you sit or stand for a while. Long plane flights or car rides often cause swelling in the legs and feet. You may also have swelling if you have to stand for long periods of time at your job. Problems with the veins in the legs (varicose veins) and changes in hormones can also cause swelling.  Sometimes the swelling in the ankles and feet is caused by a more serious problem, such as heart failure, infection, blood clots, or liver or kidney disease. Follow-up care is a key part of your treatment and safety. Be sure to make and go to all appointments, and call your doctor if you are having problems. It's also a good idea to know your test results and keep a list of the medicines you take. How can you care for yourself at home? · If your doctor gave you medicine, take it as prescribed. Call your doctor if you think you are having a problem with your medicine. · Whenever you are resting, raise your legs up. Try to keep the swollen area higher than the level of your heart. · Take breaks from standing or sitting in one position. ¨ Walk around to increase the blood flow in your lower legs. ¨ Move your feet and ankles often while you stand, or tighten and relax your leg muscles. · Wear support stockings. Put them on in the morning, before swelling gets worse. · Eat a balanced diet. Lose weight if you need to. · Limit the amount of salt (sodium) in your diet. Salt holds fluid in the body and may increase swelling. When should you call for help? Call 911 anytime you think you may need emergency care. For example, call if:  ? · You have symptoms of a blood clot in your lung (called a pulmonary embolism). These may include:  ¨ Sudden chest pain. ¨ Trouble breathing. ¨ Coughing up blood. ?Call your doctor now or seek immediate medical care if:  ? · You have signs of a blood clot, such as:  ¨ Pain in your calf, back of the knee, thigh, or groin. ¨ Redness and swelling in your leg or groin. ? · You have symptoms of infection, such as:  ¨ Increased pain, swelling, warmth, or redness. ¨ Red streaks or pus. ¨ A fever. ? Watch closely for changes in your health, and be sure to contact your doctor if:  ? · Your swelling is getting worse. ? · You have new or worsening pain in your legs.    ? · You do not get better as expected. Where can you learn more? Go to http://thien-homero.info/. Enter H112 in the search box to learn more about \"Leg and Ankle Edema: Care Instructions. \"  Current as of: March 20, 2017  Content Version: 11.4  © 1376-6880 [employer]. Care instructions adapted under license by [employer] (which disclaims liability or warranty for this information). If you have questions about a medical condition or this instruction, always ask your healthcare professional. [address] any warranty or liability for your use of this information.

## 2018-02-07 NOTE — MR AVS SNAPSHOT
[address] [facility] [address] [facility] 102 [address] 
[phone] Patient: Dusty Prajapati MRN:  [id number] Visit Information Date & Time Provider Department Dept. Phone Encounter #  
 2/7/2018  1:40 PM Gurpreet Coronado, [address] [facility] [phone] [id number] Your Appointments 3/6/2018 10:00 AM  
ROUTINE CARE with Jenni Blake DO [facility] Internal [facility] ([facility]) Appt Note: 6 month follow up [address] [place] [address] N [facility] 102 [facility] [address]  
[phone]  
  
   
 [address] Upcoming Health Maintenance Date Due Hepatitis C Screening 1955 Pneumococcal 19-64 Medium Risk (1 of 1 - PPSV23) 6/22/1976 FOBT Q 1 YEAR AGE 50-75 6/22/2007 ZOSTER VACCINE AGE 60> 4/22/2017 PAP AKA CERVICAL CYTOLOGY 12/17/2017 BREAST CANCER SCRN MAMMOGRAM 1/18/2019 DTaP/Tdap/Td series (2 - Td) 12/17/2024 Allergies as of 2/7/2018  Review Complete On: 2/7/2018 By: Arnulfo Velazquez LPN Severity Noted Reaction Type Reactions Codeine High 07/23/2010   Intolerance Shortness of Breath, Swelling Pcn [Penicillins] High 07/23/2010   Intolerance Shortness of Breath, Swelling E-mycin E Medium 07/23/2010   Intolerance Hives, Nausea and Vomiting Scopolamine Medium 12/05/2012    Swelling Adhesive Tape-silicones  86/28/0889    Rash, Swelling Current Immunizations  Reviewed on 2/7/2017 Name Date Influenza Vaccine 10/16/2017, 10/15/2014, 10/21/2013 Influenza Vaccine Split 9/5/2011 Tdap 12/17/2014 ZZZ-RETIRED (DO NOT USE) Pneumococcal Vaccine (Unspecified Type)  Deferred (Patient Refused) Not reviewed this visit Vitals BP Pulse Temp Resp Height(growth percentile) Weight(growth percentile)  151/80 (BP 1 Location: Left arm, BP Patient Position: Sitting) 95 97.4 °F (36.3 °C) (Oral) 18 5' (1.524 m) 291 lb 12.8 oz (132.4 kg) SpO2 BMI OB Status Smoking Status 95% 56.99 kg/m2 Hysterectomy Current Every Day Smoker BMI and BSA Data Body Mass Index Body Surface Area 56.99 kg/m 2 2.37 m 2 Preferred Pharmacy Pharmacy Name Phone [id number], [address] [phone] Your Updated Medication List  
  
   
This list is accurate as of: 2/7/18  2:03 PM.  Always use your most recent med list.  
  
  
  
  
 aspirin 81 mg chewable tablet Take 81 mg by mouth daily. clindamycin 300 mg capsule Commonly known as:  CLEOCIN Take 1 Cap by mouth three (3) times daily for 7 days. Indications: STAPHYLOCOCCUS AUREUS SKIN AND SKIN STRUCTURE INFECTION  
  
 clobetasol 0.05 % topical cream  
Commonly known as:  Cristel Delmy Apply  to affected area two (2) times a day. FOR PSORASIS * furosemide 20 mg tablet Commonly known as:  LASIX Take 1 Tab by mouth daily. * furosemide 20 mg tablet Commonly known as:  LASIX Take 1 Tab by mouth daily. ipratropium-albuterol  mcg/actuation inhaler Commonly known as:  COMBIVENT Take 2 Puffs by inhalation every six (6) hours as needed for Wheezing. L. acidoph & paracasei- S therm- Bifido 8 billion cell Cap cap Commonly known as:  YANICK-Q/RISAQUAD Take 1 Cap by mouth daily. levoFLOXacin 750 mg tablet Commonly known as:  Telma Patient Take 1 Tab by mouth daily for 7 days. Indications: Skin and Skin Structure Infection  
  
 losartan 50 mg tablet Commonly known as:  COZAAR  
TAKE 1 TABLET BY MOUTH DAILY. lovastatin 40 mg tablet Commonly known as:  MEVACOR  
TAKE 1 TABLET BY MOUTH NIGHTLY  
  
 metroNIDAZOLE 1 % topical gel Commonly known as:  Demaris Sartorius Apply  to affected area daily. Use a thin layer to affected areas after washing  
  
 montelukast 10 mg tablet Commonly known as:  SINGULAIR  
 TAKE 1 TABLET BY MOUTH EVERY DAY  
  
 potassium chloride 20 mEq tablet Commonly known as:  K-DUR, KLOR-CON Take 1 Tab by mouth daily. triamterene-hydroCHLOROthiazide 37.5-25 mg per tablet Commonly known as:  Kel Matias TAKE 1 TABLET BY MOUTH EVERY DAY  
  
 VITAMIN D3 PO Take 1,000 mg by mouth daily. * Notice: This list has 2 medication(s) that are the same as other medications prescribed for you. Read the directions carefully, and ask your doctor or other care provider to review them with you. Introducing [facility]! Dear Oskar Medina: Thank you for requesting a [employer] account. Our records indicate that you already have an active [employer] account. You can access your account anytime at [email or web address] Did you know that you can access your hospital and ER discharge instructions at any time in [employer]? You can also review all of your test results from your hospital stay or ER visit. Additional Information If you have questions, please visit the Frequently Asked Questions section of the [employer] website at [email or web address] Remember, [employer] is NOT to be used for urgent needs. For medical emergencies, dial 911. Now available from your iPhone and Android! Please provide this summary of care documentation to your next provider. Your primary care clinician is listed as Fredi Christianson. If you have any questions after today's visit, please call [phone].

## 2018-02-07 NOTE — PROGRESS NOTES
Health Maintenance Due   Topic Date Due    Hepatitis C Screening  1955    Pneumococcal 19-64 Medium Risk (1 of 1 - PPSV23) 06/22/1976    FOBT Q 1 YEAR AGE 50-75  06/22/2007    ZOSTER VACCINE AGE 60>  04/22/2017    PAP AKA CERVICAL CYTOLOGY  12/17/2017       Chief Complaint   Patient presents with    Follow-up     [facility], cellulitus     Hypertension       1. Have you been to the ER, urgent care clinic since your last visit? Hospitalized since your last visit? Yes When: 1/31/18 Where: [facility] Reason for visit: Cellulitus    2. Have you seen or consulted any other health care providers outside of the [facility] [place] [address] since your last visit? Include any pap smears or colon screening. No    3) Do you have an Advance Directive on file? no    4) Are you interested in receiving information on Advance Directives? NO      Patient is accompanied by self I have received verbal consent from Vee Bee to discuss any/all medical information while they are present in the room.

## 2018-02-08 ENCOUNTER — PATIENT OUTREACH (OUTPATIENT)
Dept: OTHER | Age: 63
End: 2018-02-08

## 2018-02-08 NOTE — PROGRESS NOTES
SUKUMAR follow up. Patient with inpatient stay for bilateral lower leg cellulitis DC . Chart review:  PCP visit  - closely monitoring;   Patient reports seeing podiatry - callus debriding has started. Contacted patient for transitions of care services. Verified  and address for HIPAA security. * Ms. Kelly reports that she is feeling some better/ still very weak and hard to ambulate/ but much better. * Foot care per podiatry includes soaking with episome salts twice per day. She is managing this with little care. * She sees PCP again next week for close monitoring of her cellulitis   - She reports much reduction in redness/  - but can ambulate with little pain/ just weakness.     - Still taking antibiotics and pro-biotics . No problems with her appetite/ stomach or bowel movements. * Discussed her smoking habits in relation to her vascular supply. - She has smoked since age 14   - Cut down drastically now to 3-4 per day. - Discussed that she cannot use many means of support to quit (patches/ gum/ some meds have been tried). Difficulty with her dentures. She is working with her practitioner to find some medical support. Some new option - MOYA? * Offered continued support after SUKUMAR if interested with CM for lifestyle changes or continued care coordination. * She feels that she is on the right path/ and has hopes to return to work in another week. She has my number and will contact me if needs arise. She is clear on the medical treatments and medications. And FU visits are in progress. Will follow for [facility] services. Two weeks. Chart Review:  Attended FU PCP apt      Assessment/ Plan:   Diagnoses and all orders for this visit:     1. Cellulitis of right lower extremity                        Continue antibiotics as prescribed.      2. Localized edema     3. Morbid obesity ([facility] [address]     Follow-up on Monday.

## 2018-02-12 ENCOUNTER — OFFICE VISIT (OUTPATIENT)
Dept: INTERNAL MEDICINE CLINIC | Age: 63
End: 2018-02-12

## 2018-02-12 VITALS
SYSTOLIC BLOOD PRESSURE: 135 MMHG | DIASTOLIC BLOOD PRESSURE: 63 MMHG | RESPIRATION RATE: 18 BRPM | WEIGHT: 292.4 LBS | TEMPERATURE: 96.8 F | HEIGHT: 60 IN | OXYGEN SATURATION: 97 % | BODY MASS INDEX: 57.41 KG/M2 | HEART RATE: 98 BPM

## 2018-02-12 DIAGNOSIS — L03.116 CELLULITIS OF LEFT LOWER EXTREMITY: ICD-10-CM

## 2018-02-12 RX ORDER — LACTOBACILLUS COMBINATION NO.4 3B CELL
CAPSULE ORAL
Refills: 0 | COMMUNITY
Start: 2018-02-05

## 2018-02-12 RX ORDER — CLINDAMYCIN HYDROCHLORIDE 300 MG/1
300 CAPSULE ORAL 3 TIMES DAILY
Qty: 21 CAP | Refills: 0 | Status: SHIPPED | OUTPATIENT
Start: 2018-02-12 | End: 2018-02-19

## 2018-02-12 RX ORDER — BACITRACIN ZINC 500 UNIT/G
OINTMENT (GRAM) TOPICAL 2 TIMES DAILY
Qty: 15 G | Refills: 0 | Status: SHIPPED | OUTPATIENT
Start: 2018-02-12 | End: 2021-08-17 | Stop reason: ALTCHOICE

## 2018-02-12 NOTE — PATIENT INSTRUCTIONS
Cellulitis: Care Instructions  Your Care Instructions    Cellulitis is a skin infection. It often occurs after a break in the skin from a scrape, cut, bite, or puncture, or after a rash. The doctor has checked you carefully, but problems can develop later. If you notice any problems or new symptoms, get medical treatment right away. Follow-up care is a key part of your treatment and safety. Be sure to make and go to all appointments, and call your doctor if you are having problems. It's also a good idea to know your test results and keep a list of the medicines you take. How can you care for yourself at home? · Take your antibiotics as directed. Do not stop taking them just because you feel better. You need to take the full course of antibiotics. · Prop up the infected area on pillows to reduce pain and swelling. Try to keep the area above the level of your heart as often as you can. · If your doctor told you how to care for your wound, follow your doctor's instructions. If you did not get instructions, follow this general advice:  ¨ Wash the wound with clean water 2 times a day. Don't use hydrogen peroxide or alcohol, which can slow healing. ¨ You may cover the wound with a thin layer of petroleum jelly, such as Vaseline, and a nonstick bandage. ¨ Apply more petroleum jelly and replace the bandage as needed. · Be safe with medicines. Take pain medicines exactly as directed. ¨ If the doctor gave you a prescription medicine for pain, take it as prescribed. ¨ If you are not taking a prescription pain medicine, ask your doctor if you can take an over-the-counter medicine. To prevent cellulitis in the future  · Try to prevent cuts, scrapes, or other injuries to your skin. Cellulitis most often occurs where there is a break in the skin. · If you get a scrape, cut, mild burn, or bite, wash the wound with clean water as soon as you can to help avoid infection.  Don't use hydrogen peroxide or alcohol, which can slow healing. · If you have swelling in your legs (edema), support stockings and good skin care may help prevent leg sores and cellulitis. · Take care of your feet, especially if you have diabetes or other conditions that increase the risk of infection. Wear shoes and socks. Do not go barefoot. If you have athlete's foot or other skin problems on your feet, talk to your doctor about how to treat them. When should you call for help? Call your doctor now or seek immediate medical care if:  ? · You have signs that your infection is getting worse, such as:  ¨ Increased pain, swelling, warmth, or redness. ¨ Red streaks leading from the area. ¨ Pus draining from the area. ¨ A fever. ? · You get a rash. ? Watch closely for changes in your health, and be sure to contact your doctor if:  ? · You are not getting better after 1 day (24 hours). ? · You do not get better as expected. Where can you learn more? Go to http://thien-homero.info/. Melanie Jose in the search box to learn more about \"Cellulitis: Care Instructions. \"  Current as of: October 13, 2016  Content Version: 11.4  © 2165-7617 [employer]. Care instructions adapted under license by [employer] (which disclaims liability or warranty for this information). If you have questions about a medical condition or this instruction, always ask your healthcare professional. [place] [address] any warranty or liability for your use of this information.

## 2018-02-12 NOTE — LETTER
2/13/2018 9:11 AM 
 
[facility]. [facility] [address] Dear Luis Eduardo Mcmahon: 
 
Please find your most recent results below. Resulted Orders CBC W/O DIFF Result Value Ref Range WBC 11.0 (H) 3.4 - 10.8 x10E3/uL  
 RBC 5.09 3.77 - 5.28 x10E6/uL HGB 14.5 11.1 - 15.9 g/dL HCT 44.9 34.0 - 46.6 % MCV 88 79 - 97 fL  
 MCH 28.5 26.6 - 33.0 pg  
 MCHC 32.3 31.5 - 35.7 g/dL  
 RDW 14.9 12.3 - 15.4 % PLATELET 117 189 - 112 x10E3/uL Narrative Performed at:  [address]  [id number] : Landon White MD, Phone:  [phone] METABOLIC PANEL, COMPREHENSIVE Result Value Ref Range Glucose 94 65 - 99 mg/dL BUN 23 8 - 27 mg/dL Creatinine 0.87 0.57 - 1.00 mg/dL GFR est non-AA 73 >59 mL/min/1.73 GFR est AA 84 >59 mL/min/1.73  
 BUN/Creatinine ratio 26 12 - 28 Sodium 140 134 - 144 mmol/L Potassium 3.9 3.5 - 5.2 mmol/L Chloride 94 (L) 96 - 106 mmol/L  
 CO2 25 18 - 29 mmol/L Calcium 9.7 8.7 - 10.3 mg/dL Protein, total 7.5 6.0 - 8.5 g/dL Albumin 4.1 3.6 - 4.8 g/dL GLOBULIN, TOTAL 3.4 1.5 - 4.5 g/dL A-G Ratio 1.2 1.2 - 2.2 Bilirubin, total 0.6 0.0 - 1.2 mg/dL Alk. phosphatase 87 39 - 117 IU/L  
 AST (SGOT) 17 0 - 40 IU/L  
 ALT (SGPT) 22 0 - 32 IU/L Narrative Performed at:  [address]  [id number] : Landon White MD, Phone:  [phone] RECOMMENDATIONS: 
 
Labs stable Please call me if you have any questions: [phone] Sincerely, Roberto Logan NP

## 2018-02-12 NOTE — PROGRESS NOTES
Health Maintenance Due   Topic Date Due    Hepatitis C Screening  1955    Pneumococcal 19-64 Medium Risk (1 of 1 - PPSV23) 06/22/1976    FOBT Q 1 YEAR AGE 50-75  06/22/2007    ZOSTER VACCINE AGE 60>  04/22/2017    PAP AKA CERVICAL CYTOLOGY  12/17/2017       Chief Complaint   Patient presents with    Follow-up    Ankle swelling    Hypertension       1. Have you been to the ER, urgent care clinic since your last visit? Hospitalized since your last visit? No    2. Have you seen or consulted any other health care providers outside of the [address] [place] [address] since your last visit? Include any pap smears or colon screening. No    3) Do you have an Advance Directive on file? no    4) Are you interested in receiving information on Advance Directives? NO      Patient is accompanied by self I have received verbal consent from Brigid Morel to discuss any/all medical information while they are present in the room.

## 2018-02-12 NOTE — PROGRESS NOTES
Subjective:     Chief Complaint   Patient presents with    Follow-up    Ankle swelling    Hypertension       History of Present Illness    Clem Chapman is a 63y.o. year old female who is a patient of Dr. Markell Silva that presents today for follow-up. She was recently admitted to [facility] for right leg cellulitis. The patient was seen in this office on 1/29/2018 for left leg cellulitis. She was feeling generally well at that time. She was continued on Clindamycin and referred to podiatry for evaluation of callus and dry scaly feet. On 01/31/2018 she began to develop a fever and generalized malaise. She went to the ED and was found to have a white count of 26.7. The cellulitis had spread to her right leg. She was started on IV vancomycin and was admitted for 5 days. The warmth and redness is almost resolved in both legs. She continues on PO antibiotics. Blood cultures were negative. She is scheduled to follow-up with podiatry next week for another debridement of her bilateral feet. She denies any other new complaints today. No CP, SOB, GI, or  symptoms. Reviewed medications, recent lab work and imaging with patient. Pt reports compliance with medications. Current Outpatient Prescriptions on File Prior to Visit   Medication Sig Dispense Refill    L. acidoph & paracasei- S therm- Bifido (YANICK-Q/RISAQUAD) 8 billion cell cap cap Take 1 Cap by mouth daily. 7 Cap 0    levoFLOXacin (LEVAQUIN) 750 mg tablet Take 1 Tab by mouth daily for 7 days. Indications: Skin and Skin Structure Infection 7 Tab 0    furosemide (LASIX) 20 mg tablet Take 1 Tab by mouth daily. 30 Tab 0    potassium chloride (K-DUR, KLOR-CON) 20 mEq tablet Take 1 Tab by mouth daily. 30 Tab 0    lovastatin (MEVACOR) 40 mg tablet TAKE 1 TABLET BY MOUTH NIGHTLY 30 Tab 5    montelukast (SINGULAIR) 10 mg tablet TAKE 1 TABLET BY MOUTH EVERY DAY 30 Tab 5    losartan (COZAAR) 50 mg tablet TAKE 1 TABLET BY MOUTH DAILY.  30 Tab 5    triamterene-hydroCHLOROthiazide (MAXZIDE) 37.5-25 mg per tablet TAKE 1 TABLET BY MOUTH EVERY DAY 30 Tab 5    clobetasol (TEMOVATE) 0.05 % topical cream Apply  to affected area two (2) times a day. FOR PSORASIS 1 Tube 5    metroNIDAZOLE (METROGEL) 1 % topical gel Apply  to affected area daily. Use a thin layer to affected areas after washing 45 g 5    ipratropium-albuterol (COMBIVENT)  mcg/actuation inhaler Take 2 Puffs by inhalation every six (6) hours as needed for Wheezing. 1 Inhaler 5    CHOLECALCIFEROL, VITAMIN D3, (VITAMIN D-3 PO) Take 1,000 mg by mouth daily.  aspirin 81 mg chewable tablet Take 81 mg by mouth daily. No current facility-administered medications on file prior to visit.         Allergies   Allergen Reactions    Codeine Shortness of Breath and Swelling    Pcn [Penicillins] Shortness of Breath and Swelling    E-Mycin E Hives and Nausea and Vomiting    Scopolamine Swelling    Adhesive Tape-Silicones Rash and Swelling      Past Medical History:   Diagnosis Date    Angina decubitus (HCC)     Arthritis     KNEES    Asthma     COPD     Gallstones     H/O seasonal allergies     History of blood transfusion EARLY     [facility], NO REACTION    Hypercholesterolemia     Hypertension     IBS (irritable bowel syndrome)     Morbid obesity (HCC)     Nausea & vomiting     WITH ALL 3 SURGERIES    Obesity     Other ill-defined conditions(799.89)     High cholesterol    Psoriasis     Recurrent umbilical hernia     Sleep apnea     DOESN'T USE CPAP; SLEEPS IN RECLINER FOR 2 YEARS    Unspecified adverse effect of anesthesia     SMALL AIRWAY, ENLARGED TONSILS    URI (upper respiratory infection) 2014      Past Surgical History:   Procedure Laterality Date    HX  SECTION  1987    HX CHOLECYSTECTOMY  2011    by Dr Jamie Hutchinson Right      - Benign    HX DILATION AND CURETTAGE  10/2012    HX HEART CATHETERIZATION  2011    HX HEENT      WISDOM TEETH EXTRACTION    HX HEENT Bilateral 1980'S    TAN. TEAR DUCT TUMOR REMOVAL    HX HEENT      ALL TEETH REMOVED    HX HERNIA REPAIR  5/29/4494    umbilical hernia repair by Dr Marielle Estrada  12/1/12    ventral hernia repair by Dr Adry Baltazar  6/9/16    Laparoscopic repair of recurrent incisional hernia with mesh by Dr Sarah Deluca  12/1/2012    Dr Elton Price Left 2/09   2 PROCEDURES    ORIF L FOOT, 2ND REPAIR    HX WISDOM TEETH EXTRACTION      KNEE ARTHROSCP HARV      KNEE SCOPE,MED OR LAT MENIS REPAIR Bilateral 1980    TAN 2 SEPARATE SURGERIES SAME YEAR    LAP,CHOLECYSTECTOMY  4/27/11      Social History   Substance Use Topics    Smoking status: Current Every Day Smoker     Packs/day: 0.25     Years: 38.00     Types: Cigarettes    Smokeless tobacco: Never Used      Comment: DOWN TO 5 CIGARETTES PER DAY. SMOKED 2 PPD FOR 7 YEARS    Alcohol use No      Family History   Problem Relation Age of Onset    Hypertension Mother     Post-op Nausea/Vomiting Mother     Stroke Mother     Post-op Nausea/Vomiting Daughter     Heart Disease Father     No Known Problems Sister     No Known Problems Sister     No Known Problems Sister         Objective:     Vitals:    02/12/18 1209   BP: 135/63   Pulse: 98   Resp: 18   Temp: 96.8 °F (36 °C)   TempSrc: Oral   SpO2: 97%   Weight: 292 lb 6.4 oz (132.6 kg)   Height: 5' (1.524 m)       Review of Systems   Constitutional: Negative. HENT: Negative. Eyes: Negative. Respiratory: Negative. Cardiovascular: Positive for leg swelling. Gastrointestinal: Negative. Genitourinary: Negative. Musculoskeletal: Negative. Skin:        Redness   Neurological: Negative. Psychiatric/Behavioral: Negative. Physical Exam   Constitutional: She is oriented to person, place, and time.  She appears well-developed and well-nourished. No distress. Well-appearing obese  female. NAD   HENT:   Head: Normocephalic and atraumatic. Eyes: Conjunctivae and EOM are normal. Pupils are equal, round, and reactive to light. Neck: Normal range of motion. Neck supple. Cardiovascular: Normal rate, regular rhythm and normal heart sounds. Pulmonary/Chest: Effort normal and breath sounds normal. No respiratory distress. She has no wheezes. Abdominal: She exhibits no distension. Musculoskeletal: Normal range of motion. She exhibits edema. She exhibits no tenderness or deformity. BLE R>L   Feet:   Right Foot:   Skin Integrity: Positive for callus and dry skin. Left Foot:   Skin Integrity: Positive for callus and dry skin. Neurological: She is alert and oriented to person, place, and time. Skin: Skin is warm and dry. She is not diaphoretic. There is erythema. Bilateral lower leg with erythema and edema from ankle to feet. Improving since last visit. 3+ edema noted   Psychiatric: She has a normal mood and affect. Her behavior is normal.   Nursing note and vitals reviewed. Assessment/ Plan:   Diagnoses and all orders for this visit:    1. Cellulitis of left lower extremity   Will order   -     clindamycin (CLEOCIN) 300 mg capsule; Take 1 Cap by mouth three (3) times daily for 7 days. Indications: STAPHYLOCOCCUS AUREUS SKIN AND SKIN STRUCTURE INFECTION  -     bacitracin zinc (BACITRACIN) ointment; Apply  to affected area two (2) times a day. -     CBC W/O DIFF  -     METABOLIC PANEL, COMPREHENSIVE    Patient's plan of care has been reviewed with them. Patient and/or family have verbally conveyed their understanding and agreement of the patient's signs, symptoms, diagnosis, treatment and prognosis and additionally agree to follow up as recommended or return to [facility] should their condition change prior to follow-up.   Discharge instructions have also been provided to the patient with some educational information regarding their diagnosis as well a list of reasons why they would want to return to the office prior to their follow-up appointment should their condition change. Follow-up with Dr. Bryon Plasencia next month as scheduled.

## 2018-02-12 NOTE — MR AVS SNAPSHOT
1111 [facility] [address] [place] [address] 
[phone] Patient: Jamal Garay MRN:  [id number] Visit Information Date & Time Provider Department Dept. Phone Encounter #  
 2/12/2018 11:40 AM Dali Ivy NP [facility] Internal Medicine [phone] [id number] Your Appointments 3/6/2018 10:00 AM  
ROUTINE CARE with Jeanmarie Sierra DO [facility] Internal Medicine ([address]) Appt Note: 6 month follow up 200 [facility] [address] [place] [id number]  
[phone]  
  
   
 [address] [facility] [address] Upcoming Health Maintenance Date Due Hepatitis C Screening 1955 Pneumococcal 19-64 Medium Risk (1 of 1 - PPSV23) 6/22/1976 FOBT Q 1 YEAR AGE 50-75 6/22/2007 ZOSTER VACCINE AGE 60> 4/22/2017 PAP AKA CERVICAL CYTOLOGY 12/17/2017 BREAST CANCER SCRN MAMMOGRAM 1/18/2019 DTaP/Tdap/Td series (2 - Td) 12/17/2024 Allergies as of 2/12/2018  Review Complete On: 2/12/2018 By: Lavern Bello LPN Severity Noted Reaction Type Reactions Codeine High 07/23/2010   Intolerance Shortness of Breath, Swelling Pcn [Penicillins] High 07/23/2010   Intolerance Shortness of Breath, Swelling E-mycin E Medium 07/23/2010   Intolerance Hives, Nausea and Vomiting Scopolamine Medium 12/05/2012    Swelling Adhesive Tape-silicones  94/99/6206    Rash, Swelling Current Immunizations  Reviewed on 2/7/2017 Name Date Influenza Vaccine 10/16/2017, 10/15/2014, 10/21/2013 Influenza Vaccine Split 9/5/2011 Tdap 12/17/2014 ZZZ-RETIRED (DO NOT USE) Pneumococcal Vaccine (Unspecified Type)  Deferred (Patient Refused) Not reviewed this visit You Were Diagnosed With   
  
 Codes Comments Cellulitis of left lower extremity     ICD-10-CM: L03.116 ICD-9-CM: 890. 6 Vitals BP Pulse Temp Resp Height(growth percentile) Weight(growth percentile) 135/63 (BP 1 Location: Right arm, BP Patient Position: Sitting) 98 96.8 °F (36 °C) (Oral) 18 5' (1.524 m) 292 lb 6.4 oz (132.6 kg) SpO2 BMI OB Status Smoking Status 97% 57.11 kg/m2 Hysterectomy Current Every Day Smoker Vitals History BMI and BSA Data Body Mass Index Body Surface Area  
 57.11 kg/m 2 2.37 m 2 Preferred Pharmacy Pharmacy Name Phone [id number] Lucero, [address] [place] [address] [phone] Your Updated Medication List  
  
   
This list is accurate as of: 2/12/18 12:22 PM.  Always use your most recent med list.  
  
  
  
  
 aspirin 81 mg chewable tablet Take 81 mg by mouth daily. bacitracin zinc ointment Commonly known as:  BACITRACIN Apply  to affected area two (2) times a day. clindamycin 300 mg capsule Commonly known as:  CLEOCIN Take 1 Cap by mouth three (3) times daily for 7 days. Indications: STAPHYLOCOCCUS AUREUS SKIN AND SKIN STRUCTURE INFECTION  
  
 clobetasol 0.05 % topical cream  
Commonly known as:  Royal Riches Apply  to affected area two (2) times a day. FOR PSORASIS  
  
 furosemide 20 mg tablet Commonly known as:  LASIX Take 1 Tab by mouth daily. ipratropium-albuterol  mcg/actuation inhaler Commonly known as:  COMBIVENT Take 2 Puffs by inhalation every six (6) hours as needed for Wheezing. L. acidoph & paracasei- S therm- Bifido 8 billion cell Cap cap Commonly known as:  YANICK-Q/RISAQUAD Take 1 Cap by mouth daily. levoFLOXacin 750 mg tablet Commonly known as:  Elza Ripa Take 1 Tab by mouth daily for 7 days. Indications: Skin and Skin Structure Infection  
  
 losartan 50 mg tablet Commonly known as:  COZAAR  
TAKE 1 TABLET BY MOUTH DAILY. lovastatin 40 mg tablet Commonly known as:  MEVACOR  
TAKE 1 TABLET BY MOUTH NIGHTLY  
  
 metroNIDAZOLE 1 % topical gel Commonly known as:  Ehsan Boyle Apply  to affected area daily. Use a thin layer to affected areas after washing  
  
 montelukast 10 mg tablet Commonly known as:  SINGULAIR  
TAKE 1 TABLET BY MOUTH EVERY DAY  
  
 potassium chloride 20 mEq tablet Commonly known as:  K-DUR, KLOR-CON Take 1 Tab by mouth daily. PROBIOTIC 3 billion cell Cap Generic drug:  lactobacillus combination no. 4  
TAKE 1 CAPSULE BY MOUTH DAILY FOR 7 DAYS  
  
 triamterene-hydroCHLOROthiazide 37.5-25 mg per tablet Commonly known as:  [place] TAKE 1 TABLET BY MOUTH EVERY DAY  
  
 VITAMIN D3 PO Take 1,000 mg by mouth daily. Prescriptions Sent to Pharmacy Refills  
 clindamycin (CLEOCIN) 300 mg capsule 0 Sig: Take 1 Cap by mouth three (3) times daily for 7 days. Indications: STAPHYLOCOCCUS AUREUS SKIN AND SKIN STRUCTURE INFECTION Class: Normal  
 Pharmacy: [facility]/pharmacy #[address] [place] [address] Ph #: [phone] Route: Oral  
 bacitracin zinc (BACITRACIN) ointment 0 Sig: Apply  to affected area two (2) times a day. Class: Normal  
 Pharmacy: [facility]/pharmacy #[id number] Ph #: [phone] Route: Topical  
  
Introducing [facility]! Dear Matty Alvarez: Thank you for requesting a [employer] account. Our records indicate that you already have an active [employer] account. You can access your account anytime at [email or web address] Did you know that you can access your hospital and ER discharge instructions at any time in [employer]? You can also review all of your test results from your hospital stay or ER visit. Additional Information If you have questions, please visit the Frequently Asked Questions section of the [employer] website at [email or web address] Remember, [employer] is NOT to be used for urgent needs. For medical emergencies, dial 911. Now available from your iPhone and Android! Please provide this summary of care documentation to your next provider. Your primary care clinician is listed as Terri Colón. If you have any questions after today's visit, please call [phone].

## 2018-02-13 LAB
ALBUMIN SERPL-MCNC: 4.1 G/DL (ref 3.6–4.8)
ALBUMIN/GLOB SERPL: 1.2 {RATIO} (ref 1.2–2.2)
ALP SERPL-CCNC: 87 IU/L (ref 39–117)
ALT SERPL-CCNC: 22 IU/L (ref 0–32)
AST SERPL-CCNC: 17 IU/L (ref 0–40)
BILIRUB SERPL-MCNC: 0.6 MG/DL (ref 0–1.2)
BUN SERPL-MCNC: 23 MG/DL (ref 8–27)
BUN/CREAT SERPL: 26 (ref 12–28)
CALCIUM SERPL-MCNC: 9.7 MG/DL (ref 8.7–10.3)
CHLORIDE SERPL-SCNC: 94 MMOL/L (ref 96–106)
CO2 SERPL-SCNC: 25 MMOL/L (ref 18–29)
CREAT SERPL-MCNC: 0.87 MG/DL (ref 0.57–1)
ERYTHROCYTE [DISTWIDTH] IN BLOOD BY AUTOMATED COUNT: 14.9 % (ref 12.3–15.4)
GFR SERPLBLD CREATININE-BSD FMLA CKD-EPI: 73 ML/MIN/1.73
GFR SERPLBLD CREATININE-BSD FMLA CKD-EPI: 84 ML/MIN/1.73
GLOBULIN SER CALC-MCNC: 3.4 G/DL (ref 1.5–4.5)
GLUCOSE SERPL-MCNC: 94 MG/DL (ref 65–99)
HCT VFR BLD AUTO: 44.9 % (ref 34–46.6)
HGB BLD-MCNC: 14.5 G/DL (ref 11.1–15.9)
MCH RBC QN AUTO: 28.5 PG (ref 26.6–33)
MCHC RBC AUTO-ENTMCNC: 32.3 G/DL (ref 31.5–35.7)
MCV RBC AUTO: 88 FL (ref 79–97)
PLATELET # BLD AUTO: 289 X10E3/UL (ref 150–379)
POTASSIUM SERPL-SCNC: 3.9 MMOL/L (ref 3.5–5.2)
PROT SERPL-MCNC: 7.5 G/DL (ref 6–8.5)
RBC # BLD AUTO: 5.09 X10E6/UL (ref 3.77–5.28)
SODIUM SERPL-SCNC: 140 MMOL/L (ref 134–144)
WBC # BLD AUTO: 11 X10E3/UL (ref 3.4–10.8)

## 2018-02-22 ENCOUNTER — PATIENT OUTREACH (OUTPATIENT)
Dept: OTHER | Age: 63
End: 2018-02-22

## 2018-02-22 NOTE — PROGRESS NOTES
SUKUMAR follow up. Patient with ED visit and Inpatient stay for cellulitis/ with podiatry issues - last DC 2/05. Chart review:  PCP 02/13-  Specialist podiatry; Lab work see below. Attempted to contact patient for transitions of care services. Left discreet message on voicemail with this CM contact information. * Offered continued CM on message. Will follow for [facility] services. Next attempt in 2 weeks. Chart Review;  1. Cellulitis of left lower extremity                        Will order   -     clindamycin (CLEOCIN) 300 mg capsule; Take 1 Cap by mouth three (3) times daily for 7 days. Indications: STAPHYLOCOCCUS AUREUS SKIN AND SKIN STRUCTURE INFECTION  -     bacitracin zinc (BACITRACIN) ointment; Apply  to affected area two (2) times a day.   -     CBC W/O DIFF  -     METABOLIC PANEL, COMPREHENSIVE    BMP - WNL     2/13/2018  7:39 AM - Devin, [facility] Lab Results In   Component Results   Component Value Flag Ref Range Units Status   WBC 11.0 (H) 3.4 - 10.8 x10E3/uL Final

## 2018-02-24 DIAGNOSIS — E87.6 HYPOKALEMIA: ICD-10-CM

## 2018-02-26 RX ORDER — POTASSIUM CHLORIDE 1500 MG/1
TABLET, EXTENDED RELEASE ORAL
Qty: 30 TAB | Refills: 0 | Status: SHIPPED | OUTPATIENT
Start: 2018-02-26 | End: 2018-03-25 | Stop reason: SDUPTHER

## 2018-03-02 RX ORDER — FUROSEMIDE 20 MG/1
TABLET ORAL
Qty: 30 TAB | Refills: 0 | Status: SHIPPED | OUTPATIENT
Start: 2018-03-02 | End: 2018-03-29 | Stop reason: SDUPTHER

## 2018-03-04 DIAGNOSIS — I10 ESSENTIAL HYPERTENSION: Chronic | ICD-10-CM

## 2018-03-04 DIAGNOSIS — E78.00 PURE HYPERCHOLESTEROLEMIA: Chronic | ICD-10-CM

## 2018-03-06 ENCOUNTER — OFFICE VISIT (OUTPATIENT)
Dept: INTERNAL MEDICINE CLINIC | Age: 63
End: 2018-03-06

## 2018-03-06 VITALS
WEIGHT: 289 LBS | RESPIRATION RATE: 19 BRPM | SYSTOLIC BLOOD PRESSURE: 147 MMHG | BODY MASS INDEX: 56.74 KG/M2 | HEIGHT: 60 IN | OXYGEN SATURATION: 95 % | DIASTOLIC BLOOD PRESSURE: 62 MMHG | HEART RATE: 93 BPM

## 2018-03-06 DIAGNOSIS — L40.9 GENERALIZED PSORIASIS: ICD-10-CM

## 2018-03-06 DIAGNOSIS — I10 ESSENTIAL HYPERTENSION: Chronic | ICD-10-CM

## 2018-03-06 DIAGNOSIS — I87.2 VENOUS STASIS DERMATITIS OF LOWER EXTREMITY: ICD-10-CM

## 2018-03-06 DIAGNOSIS — J44.1 CHRONIC OBSTRUCTIVE PULMONARY DISEASE WITH ACUTE EXACERBATION (HCC): ICD-10-CM

## 2018-03-06 DIAGNOSIS — L40.3 PLANTAR PUSTULAR PSORIASIS: Primary | ICD-10-CM

## 2018-03-06 DIAGNOSIS — E66.01 MORBID OBESITY (HCC): ICD-10-CM

## 2018-03-06 DIAGNOSIS — E78.00 PURE HYPERCHOLESTEROLEMIA: Chronic | ICD-10-CM

## 2018-03-06 DIAGNOSIS — L71.9 ROSACEA: ICD-10-CM
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
## 2018-03-06 NOTE — LETTER
3/8/2018 2:57 PM 
 
Ms. Francine Alonso [facility] [address] Dear Chong Heading: 
 
Please find your most recent results below. Resulted Orders LIPID PANEL Result Value Ref Range Cholesterol, total 139 100 - 199 mg/dL Triglyceride 112 0 - 149 mg/dL HDL Cholesterol 49 >39 mg/dL VLDL, calculated 22 5 - 40 mg/dL LDL, calculated 68 0 - 99 mg/dL Narrative Performed at:  [address]  [id number] : Jermain Allen MD, Phone:  [phone] METABOLIC PANEL, BASIC Result Value Ref Range Glucose 91 65 - 99 mg/dL BUN 22 8 - 27 mg/dL Creatinine 0.72 0.57 - 1.00 mg/dL GFR est non-AA 91 >59 mL/min/1.73 GFR est  >59 mL/min/1.73  
 BUN/Creatinine ratio 31 (H) 12 - 28 Sodium 141 134 - 144 mmol/L Potassium 3.4 (L) 3.5 - 5.2 mmol/L Chloride 94 (L) 96 - 106 mmol/L  
 CO2 31 (H) 18 - 29 mmol/L Calcium 10.0 8.7 - 10.3 mg/dL Narrative Performed at:  [address]  [id number] : Jermain Allen MD, Phone:  [phone] CVD REPORT Result Value Ref Range INTERPRETATION Note Comment:  
   Supplemental report is available. Narrative Performed at:  [address] [place] [address]  [id number] : Alta Atkinson PhD, Phone:  [phone] RECOMMENDATIONS: 
Potassium slightly low. Other labs stable. Please call me if you have any questions: [phone] Sincerely, 
 
 
Ha Arteaga, DO

## 2018-03-06 NOTE — MR AVS SNAPSHOT
1111 [facility] [address] [place] [address] 
[phone] Patient: Pierre Greene MRN:  [id number] Visit Information Date & Time Provider Department Dept. Phone Encounter #  
 3/6/2018 10:00 AM Elfego Miller, 227 [facility] Internal Medicine [phone] [id number] Follow-up Instructions Return in about 6 months (around 9/6/2018). Upcoming Health Maintenance Date Due Hepatitis C Screening 1955 Pneumococcal 19-64 Medium Risk (1 of 1 - PPSV23) 6/22/1976 FOBT Q 1 YEAR AGE 50-75 6/22/2007 ZOSTER VACCINE AGE 60> 4/22/2017 BREAST CANCER SCRN MAMMOGRAM 1/18/2019 PAP AKA CERVICAL CYTOLOGY 3/6/2021 DTaP/Tdap/Td series (2 - Td) 12/17/2024 Allergies as of 3/6/2018  Review Complete On: 3/6/2018 By: Elfego Miller, DO Severity Noted Reaction Type Reactions Codeine High 07/23/2010   Intolerance Shortness of Breath, Swelling Pcn [Penicillins] High 07/23/2010   Intolerance Shortness of Breath, Swelling E-mycin E Medium 07/23/2010   Intolerance Hives, Nausea and Vomiting Scopolamine Medium 12/05/2012    Swelling Adhesive Tape-silicones  68/07/8144    Rash, Swelling Current Immunizations  Reviewed on 2/7/2017 Name Date Influenza Vaccine 10/16/2017, 10/15/2014, 10/21/2013 Influenza Vaccine Split 9/5/2011 Tdap 12/17/2014 ZZZ-RETIRED (DO NOT USE) Pneumococcal Vaccine (Unspecified Type)  Deferred (Patient Refused) Not reviewed this visit You Were Diagnosed With   
  
 Codes Comments Plantar pustular psoriasis    -  Primary ICD-10-CM: L40.8 ICD-9-CM: 696.1 Generalized psoriasis     ICD-10-CM: L40.9 ICD-9-CM: 696.1 Chronic obstructive pulmonary disease with acute exacerbation (HCC)     ICD-10-CM: J44.1 ICD-9-CM: 491.21 Morbid obesity ([address]     ICD-10-CM: E66.01 
ICD-9-CM: 278.01 Essential hypertension     ICD-10-CM: I10 
ICD-9-CM: 401.9 Pure hypercholesterolemia     ICD-10-CM: E78.00 ICD-9-CM: 272.0 Rosacea     ICD-10-CM: L71.9 ICD-9-CM: 695.3 Venous stasis dermatitis of lower extremity     ICD-10-CM: I87.2 ICD-9-CM: 454.1 Vitals BP Pulse Resp Height(growth percentile) Weight(growth percentile) SpO2  
 147/62 (BP 1 Location: Right arm, BP Patient Position: Sitting) 93 19 5' (1.524 m) 289 lb (131.1 kg) 95% BMI OB Status Smoking Status 56.44 kg/m2 Hysterectomy Current Every Day Smoker Vitals History BMI and BSA Data Body Mass Index Body Surface Area  
 56.44 kg/m 2 2.36 m 2 Preferred Pharmacy Pharmacy Name Phone [facility] #[id number] [phone] Your Updated Medication List  
  
   
This list is accurate as of 3/6/18 10:34 AM.  Always use your most recent med list.  
  
  
  
  
 aspirin 81 mg chewable tablet Take 81 mg by mouth daily. bacitracin zinc ointment Commonly known as:  BACITRACIN Apply  to affected area two (2) times a day. clobetasol 0.05 % topical cream  
Commonly known as:  Eun Second Apply  to affected area two (2) times a day. FOR PSORASIS  
  
 furosemide 20 mg tablet Commonly known as:  LASIX TAKE 1 TABLET BY MOUTH DAILY  
  
 ipratropium-albuterol  mcg/actuation inhaler Commonly known as:  COMBIVENT Take 2 Puffs by inhalation every six (6) hours as needed for Wheezing. KLOR-CON M20 20 mEq tablet Generic drug:  potassium chloride TAKE 1 TAB BY MOUTH DAILY. losartan 50 mg tablet Commonly known as:  COZAAR  
TAKE 1 TABLET BY MOUTH DAILY. lovastatin 40 mg tablet Commonly known as:  MEVACOR  
TAKE 1 TABLET BY MOUTH NIGHTLY  
  
 metroNIDAZOLE 1 % topical gel Commonly known as:  Ree Deboraholl Apply  to affected area daily. Use a thin layer to affected areas after washing  
  
 montelukast 10 mg tablet Commonly known as:  SINGULAIR  
 TAKE 1 TABLET BY MOUTH EVERY DAY  
  
 PROBIOTIC 3 billion cell Cap Generic drug:  lactobacillus combination no. 4  
TAKE 1 CAPSULE BY MOUTH DAILY FOR 7 DAYS  
  
 triamterene-hydroCHLOROthiazide 37.5-25 mg per tablet Commonly known as:  [place] TAKE 1 TABLET BY MOUTH EVERY DAY  
  
 VITAMIN D3 PO Take 1,000 mg by mouth daily. We Performed the Following LIPID PANEL [64837 CPT(R)] METABOLIC PANEL, BASIC [48589 CPT(R)] REFERRAL TO DERMATOLOGY [REF19 Custom] Follow-up Instructions Return in about 6 months (around 9/6/2018). Referral Information Referral ID Referred By Referred To  
  
 [id number] MD Prakash Rojas  [address] [place], [facility] Phone: [phone] Fax: [phone] Visits Status Start Date End Date 1 New Request 3/6/18 3/6/19 If your referral has a status of pending review or denied, additional information will be sent to support the outcome of this decision. Introducing [facility]! Dear Matty Alvarez: Thank you for requesting a [employer] account. Our records indicate that you already have an active [employer] account. You can access your account anytime at [email or web address] Did you know that you can access your hospital and ER discharge instructions at any time in [employer]? You can also review all of your test results from your hospital stay or ER visit. Additional Information If you have questions, please visit the Frequently Asked Questions section of the [employer] website at [email or web address] Remember, [employer] is NOT to be used for urgent needs. For medical emergencies, dial 911. Now available from your iPhone and Android! Please provide this summary of care documentation to your next provider. Your primary care clinician is listed as Echo Palacio.  If you have any questions after today's visit, please call [phone].

## 2018-03-07 LAB
BUN SERPL-MCNC: 22 MG/DL (ref 8–27)
BUN/CREAT SERPL: 31 (ref 12–28)
CALCIUM SERPL-MCNC: 10 MG/DL (ref 8.7–10.3)
CHLORIDE SERPL-SCNC: 94 MMOL/L (ref 96–106)
CHOLEST SERPL-MCNC: 139 MG/DL (ref 100–199)
CO2 SERPL-SCNC: 31 MMOL/L (ref 18–29)
CREAT SERPL-MCNC: 0.72 MG/DL (ref 0.57–1)
GFR SERPLBLD CREATININE-BSD FMLA CKD-EPI: 105 ML/MIN/1.73
GFR SERPLBLD CREATININE-BSD FMLA CKD-EPI: 91 ML/MIN/1.73
GLUCOSE SERPL-MCNC: 91 MG/DL (ref 65–99)
HDLC SERPL-MCNC: 49 MG/DL
INTERPRETATION, 910389: NORMAL
LDLC SERPL CALC-MCNC: 68 MG/DL (ref 0–99)
POTASSIUM SERPL-SCNC: 3.4 MMOL/L (ref 3.5–5.2)
SODIUM SERPL-SCNC: 141 MMOL/L (ref 134–144)
TRIGL SERPL-MCNC: 112 MG/DL (ref 0–149)
VLDLC SERPL CALC-MCNC: 22 MG/DL (ref 5–40)

## 2018-03-08 ENCOUNTER — TELEPHONE (OUTPATIENT)
Dept: INTERNAL MEDICINE CLINIC | Age: 63
End: 2018-03-08

## 2018-03-08 ENCOUNTER — PATIENT OUTREACH (OUTPATIENT)
Dept: OTHER | Age: 63
End: 2018-03-08

## 2018-03-08 NOTE — PROGRESS NOTES
SUKUMAR  Wrap Up  Resolving current episode (Transitions of care complete). No further ED/UC or hospital admissions within 30 days post discharge. Patient attended follow-up appointments as directed. Final attempt to contact patient for transitions of care. Left discreet message on voicemail with this CM contact information. * Noted OV follow up with referral to dermatology. * No note of podiatry FU needs. * Would like to offer continued CM for Ms. Leticia/- previous offers have been declined. -  PCP office NN has also been pro-active in encouraging patient to enroll in CM. * Will send letter offering continued support with CM. Chart Review:   OV 3/5 with PCP  ASSESSMENT and PLAN  Diagnoses and all orders for this visit:     1. Plantar pustular psoriasis  -     REFERRAL TO DERMATOLOGY     2. Generalized psoriasis  -     REFERRAL TO DERMATOLOGY     3. Chronic obstructive pulmonary disease with acute exacerbation (HCC)     4. Morbid obesity ([address]     5. Essential hypertension  -     LIPID PANEL  -     METABOLIC PANEL, BASIC     6. Pure hypercholesterolemia  -     LIPID PANEL  -     METABOLIC PANEL, BASIC     7. Rosacea  -     REFERRAL TO DERMATOLOGY     8. Venous stasis dermatitis of lower extremity  -     REFERRAL TO DERMATOLOGY        Follow-up Disposition:  Return in about 6 months (around 9/6/2018).

## 2018-03-08 NOTE — LETTER
3/8/2018 10:20 AM 
 
Ms. [address] [facility] [address] Dear Ms Tone Winters, My name is Vasyl Adan, Employee Care Manager for [employer] and I hope you remember working with me since your discharge from the hospital.  I would like to offer my continued services to check in with you and work on any care coordination issues you may have or help you address any lifestyle changes you and your doctor agree on. The Employee Care Management [facility]) program is a free-of-charge confidential service provided to our employees and their family members covered by the [employer]. The program will provide an employee and his/her family with the [employer] expertise to assist in navigating the health care delivery system, provider services, and their overall care needsso as to assure and improve health care interactions and enhance the quality of life. This program is designed to provide you with the opportunity to have a [employer] care manager partner with you for the following services: 
 
 1) when you come home from the hospital or emergency room 2) when help is needed to manage your disease 3) when you need assistance coordinating services or appointments [facility] now partners with [facility]. If you are a qualifying employee, you may receive an additional 10 wellness incentive points for every month of active participation with an Employee Care Manager. [employer] is dedicated to empowering the good health of its community and improving the quality of care and care experiences for employees and their families. We are committed to safeguarding patient confidentiality and privacy, assuring that every employee has the respect he or she deserves in managing their health.  The information shared with your care manager will not be shared with anyone else aside from those you identify as part of your care team, and will only be used to assist you with any identified care needs. Please contact me if you would like this service provided to you.   
 
Sincerely, 
 
 
 
ARMANDO Corbett RN, [facility], [address] [address].  
Phone:  [phone]     Fax:  [phone]    [email or web address]

## 2018-03-22 ENCOUNTER — OFFICE VISIT (OUTPATIENT)
Dept: URGENT CARE | Age: 63
End: 2018-03-22

## 2018-03-22 VITALS
HEIGHT: 60 IN | WEIGHT: 292 LBS | BODY MASS INDEX: 57.33 KG/M2 | SYSTOLIC BLOOD PRESSURE: 196 MMHG | OXYGEN SATURATION: 98 % | DIASTOLIC BLOOD PRESSURE: 92 MMHG | TEMPERATURE: 97.7 F | RESPIRATION RATE: 18 BRPM | HEART RATE: 100 BPM

## 2018-03-22 DIAGNOSIS — L03.115 CELLULITIS OF FOOT, RIGHT: Primary | ICD-10-CM

## 2018-03-22 RX ORDER — CLINDAMYCIN HYDROCHLORIDE 300 MG/1
300 CAPSULE ORAL 3 TIMES DAILY
Qty: 30 CAP | Refills: 0 | Status: SHIPPED | OUTPATIENT
Start: 2018-03-22 | End: 2018-04-01

## 2018-03-22 NOTE — MR AVS SNAPSHOT
Janett [address] [facility] [address] 
[phone] Patient: Luis Eduardo Mcmahon MRN: [id number] QPI:7/97/[id number] Visit Information Date & Time Provider Department Dept. Phone Encounter #  
 3/22/2018  7:15 PM [facility] [phone] [id number] Follow-up Instructions Return if symptoms worsen or fail to improve, for Follow up with PCP. Your Appointments 3/23/2018  1:45 PM  
ROUTINE CARE with Allison Larson [facility] Internal Medicine ([address] [place] [address]) Appt Note: cellulitis, low grade temp 99.3  
 [address] [facility] [address]  
[phone]  
  
   
 [address] [place], [place] [address]maureen Jacobson [facility] [address]  
  
    
 9/11/2018 11:00 AM  
ROUTINE CARE with Allison Larson [facility] Internal Medicine ([address] [place] [address]) Appt Note: 6 month f/u  
 200 [facility] [address]  
[phone] Upcoming Health Maintenance Date Due Hepatitis C Screening 1955 Pneumococcal 19-64 Medium Risk (1 of 1 - PPSV23) 6/22/1976 FOBT Q 1 YEAR AGE 50-75 6/22/2007 ZOSTER VACCINE AGE 60> 4/22/2017 BREAST CANCER SCRN MAMMOGRAM 1/18/2019 PAP AKA CERVICAL CYTOLOGY 3/6/2021 DTaP/Tdap/Td series (2 - Td) 12/17/2024 Allergies as of 3/22/2018  Review Complete On: 3/22/2018 By: Peggy Neely RN Severity Noted Reaction Type Reactions Codeine High 07/23/2010   Intolerance Shortness of Breath, Swelling Pcn [Penicillins] High 07/23/2010   Intolerance Shortness of Breath, Swelling E-mycin E Medium 07/23/2010   Intolerance Hives, Nausea and Vomiting Scopolamine Medium 12/05/2012    Swelling Adhesive Tape-silicones  30/81/5201    Rash, Swelling Current Immunizations  Reviewed on 2/7/2017 Name Date Influenza Vaccine 10/16/2017, 10/15/2014, 10/21/2013 Influenza Vaccine Split 9/5/2011 Tdap 12/17/2014 ZZZ-RETIRED (DO NOT USE) Pneumococcal Vaccine (Unspecified Type)  Deferred (Patient Refused) Not reviewed this visit You Were Diagnosed With   
  
 Codes Comments Cellulitis of foot, right    -  Primary ICD-10-CM: I13.037 ICD-9-CM: 768. 7 Vitals BP Pulse Temp Resp Height(growth percentile) Weight(growth percentile) (!) 196/92 100 97.7 °F (36.5 °C) 18 5' (1.524 m) 292 lb (132.5 kg) SpO2 BMI OB Status Smoking Status 98% 57.03 kg/m2 Hysterectomy Current Every Day Smoker BMI and BSA Data Body Mass Index Body Surface Area 57.03 kg/m 2 2.37 m 2 Preferred Pharmacy Pharmacy Name Phone [id number] Rodriguez, [address] [place] [address] [phone] Your Updated Medication List  
  
   
This list is accurate as of 3/22/18  7:49 PM.  Always use your most recent med list.  
  
  
  
  
 aspirin 81 mg chewable tablet Take 81 mg by mouth daily. bacitracin zinc ointment Commonly known as:  BACITRACIN Apply  to affected area two (2) times a day. clindamycin 300 mg capsule Commonly known as:  CLEOCIN Take 1 Cap by mouth three (3) times daily for 10 days. clobetasol 0.05 % topical cream  
Commonly known as:  Dulce Serve Apply  to affected area two (2) times a day. FOR PSORASIS  
  
 furosemide 20 mg tablet Commonly known as:  LASIX TAKE 1 TABLET BY MOUTH DAILY  
  
 ipratropium-albuterol  mcg/actuation inhaler Commonly known as:  COMBIVENT Take 2 Puffs by inhalation every six (6) hours as needed for Wheezing. KLOR-CON M20 20 mEq tablet Generic drug:  potassium chloride TAKE 1 TAB BY MOUTH DAILY. losartan 50 mg tablet Commonly known as:  COZAAR  
TAKE 1 TABLET BY MOUTH DAILY. lovastatin 40 mg tablet Commonly known as:  MEVACOR  
TAKE 1 TABLET BY MOUTH NIGHTLY  
  
 metroNIDAZOLE 1 % topical gel Commonly known as:  Heather Rose  
 Apply  to affected area daily. Use a thin layer to affected areas after washing  
  
 montelukast 10 mg tablet Commonly known as:  SINGULAIR  
TAKE 1 TABLET BY MOUTH EVERY DAY  
  
 PROBIOTIC 3 billion cell Cap Generic drug:  lactobacillus combination no. 4  
TAKE 1 CAPSULE BY MOUTH DAILY FOR 7 DAYS  
  
 triamterene-hydroCHLOROthiazide 37.5-25 mg per tablet Commonly known as:  Orest Brunswick TAKE 1 TABLET BY MOUTH EVERY DAY  
  
 VITAMIN D3 PO Take 1,000 mg by mouth daily. Prescriptions Sent to Pharmacy Refills  
 clindamycin (CLEOCIN) 300 mg capsule 0 Sig: Take 1 Cap by mouth three (3) times daily for 10 days. Class: Normal  
 Pharmacy: [facility]/pharmacy #[id number] #: [phone] Route: Oral  
  
Follow-up Instructions Return if symptoms worsen or fail to improve, for Follow up with PCP. Patient Instructions Skin Abscess: Care Instructions Your Care Instructions A skin abscess is a bacterial infection that forms a pocket of pus. A boil is a kind of skin abscess. The doctor may have cut an opening in the abscess so that the pus can drain out. You may have gauze in the cut so that the abscess will stay open and keep draining. You may need antibiotics. You will need to follow up with your doctor to make sure the infection has gone away. The doctor has checked you carefully, but problems can develop later. If you notice any problems or new symptoms, get medical treatment right away. Follow-up care is a key part of your treatment and safety. Be sure to make and go to all appointments, and call your doctor if you are having problems. It's also a good idea to know your test results and keep a list of the medicines you take. How can you care for yourself at home? · Apply warm and dry compresses, a heating pad set on low, or a hot water bottle 3 or 4 times a day for pain.  Keep a cloth between the heat source and your skin. · If your doctor prescribed antibiotics, take them as directed. Do not stop taking them just because you feel better. You need to take the full course of antibiotics. · Take pain medicines exactly as directed. ¨ If the doctor gave you a prescription medicine for pain, take it as prescribed. ¨ If you are not taking a prescription pain medicine, ask your doctor if you can take an over-the-counter medicine. · Keep your bandage clean and dry. Change the bandage whenever it gets wet or dirty, or at least one time a day. · If the abscess was packed with gauze: 
¨ Keep follow-up appointments to have the gauze changed or removed. If the doctor instructed you to remove the gauze, gently pull out all of the gauze when your doctor tells you to. ¨ After the gauze is removed, soak the area in warm water for 15 to 20 minutes 2 times a day, until the wound closes. When should you call for help? Call your doctor now or seek immediate medical care if: 
? · You have signs of worsening infection, such as: 
¨ Increased pain, swelling, warmth, or redness. ¨ Red streaks leading from the infected skin. ¨ Pus draining from the wound. ¨ A fever. ? Watch closely for changes in your health, and be sure to contact your doctor if: 
? · You do not get better as expected. Where can you learn more? Go to http://thien-homero.info/. Enter D628 in the search box to learn more about \"Skin Abscess: Care Instructions. \" Current as of: October 13, 2016 Content Version: 11.4 © 4206-2677 [employer]. Care instructions adapted under license by [employer] (which disclaims liability or warranty for this information). If you have questions about a medical condition or this instruction, always ask your healthcare professional. [place] [address] any warranty or liability for your use of this information. Introducing [address]!    
 Dear Sudhir Prior: 
 Thank you for requesting a [employer] account. Our records indicate that you already have an active [employer] account. You can access your account anytime at [email or web address] Did you know that you can access your hospital and ER discharge instructions at any time in [employer]? You can also review all of your test results from your hospital stay or ER visit. Additional Information If you have questions, please visit the Frequently Asked Questions section of the [employer] website at [email or web address] Remember, [employer] is NOT to be used for urgent needs. For medical emergencies, dial 911. Now available from your iPhone and Android! Please provide this summary of care documentation to your next provider. Your primary care clinician is listed as Radha Quivers. If you have any questions after today's visit, please call [phone].

## 2018-03-22 NOTE — PATIENT INSTRUCTIONS

## 2018-03-23 ENCOUNTER — OFFICE VISIT (OUTPATIENT)
Dept: INTERNAL MEDICINE CLINIC | Age: 63
End: 2018-03-23

## 2018-03-23 VITALS
HEART RATE: 96 BPM | WEIGHT: 287 LBS | SYSTOLIC BLOOD PRESSURE: 157 MMHG | DIASTOLIC BLOOD PRESSURE: 69 MMHG | BODY MASS INDEX: 56.35 KG/M2 | TEMPERATURE: 97.6 F | OXYGEN SATURATION: 94 % | HEIGHT: 60 IN | RESPIRATION RATE: 19 BRPM

## 2018-03-23 DIAGNOSIS — I10 ESSENTIAL HYPERTENSION, BENIGN: ICD-10-CM

## 2018-03-23 DIAGNOSIS — L40.3 PLANTAR PUSTULAR PSORIASIS: ICD-10-CM

## 2018-03-23 DIAGNOSIS — L03.115 CELLULITIS OF RIGHT FOOT: Primary | ICD-10-CM

## 2018-03-23 DIAGNOSIS — J45.20 MILD INTERMITTENT ASTHMA WITHOUT COMPLICATION: ICD-10-CM

## 2018-03-23 RX ORDER — SULFAMETHOXAZOLE AND TRIMETHOPRIM 800; 160 MG/1; MG/1
1 TABLET ORAL 2 TIMES DAILY
Qty: 20 TAB | Refills: 0 | Status: SHIPPED | OUTPATIENT
Start: 2018-03-23 | End: 2018-04-02

## 2018-03-23 NOTE — MR AVS SNAPSHOT
[address] [facility] [address] 
[phone] Patient: Zandra Gary MRN:  [id number] Visit Information Date & Time Provider Department Dept. Phone Encounter #  
 3/23/2018  1:45 PM Brock Garrett [facility] Internal Medicine [phone] [id number] Follow-up Instructions Return if symptoms worsen or fail to improve. Your Appointments 9/11/2018 11:00 AM  
ROUTINE CARE with Marielle Melgoza DO [facility] Internal Medicine ([address]) Appt Note: 6 month f/u  
 200 [facility] [address] [facility] [address]  
[phone]  
  
   
 [address] [facility] [address] Upcoming Health Maintenance Date Due Hepatitis C Screening 1955 Pneumococcal 19-64 Medium Risk (1 of 1 - PPSV23) 6/22/1976 FOBT Q 1 YEAR AGE 50-75 6/22/2007 ZOSTER VACCINE AGE 60> 4/22/2017 BREAST CANCER SCRN MAMMOGRAM 1/18/2019 PAP AKA CERVICAL CYTOLOGY 3/6/2021 DTaP/Tdap/Td series (2 - Td) 12/17/2024 Allergies as of 3/23/2018  Review Complete On: 3/23/2018 By: Marielle Melgoza DO Severity Noted Reaction Type Reactions Codeine High 07/23/2010   Intolerance Shortness of Breath, Swelling Pcn [Penicillins] High 07/23/2010   Intolerance Shortness of Breath, Swelling E-mycin E Medium 07/23/2010   Intolerance Hives, Nausea and Vomiting Scopolamine Medium 12/05/2012    Swelling Adhesive Tape-silicones  73/04/0761    Rash, Swelling Current Immunizations  Reviewed on 2/7/2017 Name Date Influenza Vaccine 10/16/2017, 10/15/2014, 10/21/2013 Influenza Vaccine Split 9/5/2011 Tdap 12/17/2014 ZZZ-RETIRED (DO NOT USE) Pneumococcal Vaccine (Unspecified Type)  Deferred (Patient Refused) Not reviewed this visit You Were Diagnosed With   
  
 Codes Comments Cellulitis of right foot    -  Primary ICD-10-CM: Y75.197 ICD-9-CM: 682.7 Plantar pustular psoriasis     ICD-10-CM: L40.8 ICD-9-CM: 696.1 Essential hypertension, benign     ICD-10-CM: I10 
ICD-9-CM: 401.1 Mild intermittent asthma without complication     [facility]-RT: J45.20 ICD-9-CM: 493.90 Vitals BP Pulse Temp Resp Height(growth percentile) Weight(growth percentile) 157/69 (BP 1 Location: Left arm, BP Patient Position: Sitting) 96 97.6 °F (36.4 °C) (Oral) 19 5' (1.524 m) 287 lb (130.2 kg) SpO2 BMI OB Status Smoking Status 94% 56.05 kg/m2 Hysterectomy Current Every Day Smoker Vitals History BMI and BSA Data Body Mass Index Body Surface Area 56.05 kg/m 2 2.35 m 2 Preferred Pharmacy Pharmacy Name Phone [id number] [phone] Your Updated Medication List  
  
   
This list is accurate as of 3/23/18  2:27 PM.  Always use your most recent med list.  
  
  
  
  
 aspirin 81 mg chewable tablet Take 81 mg by mouth daily. bacitracin zinc ointment Commonly known as:  BACITRACIN Apply  to affected area two (2) times a day. clindamycin 300 mg capsule Commonly known as:  CLEOCIN Take 1 Cap by mouth three (3) times daily for 10 days. clobetasol 0.05 % topical cream  
Commonly known as:  Meghan Couch Apply  to affected area two (2) times a day. FOR PSORASIS  
  
 furosemide 20 mg tablet Commonly known as:  LASIX TAKE 1 TABLET BY MOUTH DAILY  
  
 ipratropium-albuterol  mcg/actuation inhaler Commonly known as:  COMBIVENT Take 2 Puffs by inhalation every six (6) hours as needed for Wheezing. KLOR-CON M20 20 mEq tablet Generic drug:  potassium chloride TAKE 1 TAB BY MOUTH DAILY. losartan 50 mg tablet Commonly known as:  COZAAR  
TAKE 1 TABLET BY MOUTH DAILY. lovastatin 40 mg tablet Commonly known as:  MEVACOR  
TAKE 1 TABLET BY MOUTH NIGHTLY  
  
 metroNIDAZOLE 1 % topical gel Commonly known as:  Bri Hand Apply  to affected area daily. Use a thin layer to affected areas after washing  
  
 montelukast 10 mg tablet Commonly known as:  SINGULAIR  
TAKE 1 TABLET BY MOUTH EVERY DAY  
  
 PROBIOTIC 3 billion cell Cap Generic drug:  lactobacillus combination no. 4  
TAKE 1 CAPSULE BY MOUTH DAILY FOR 7 DAYS  
  
 triamterene-hydroCHLOROthiazide 37.5-25 mg per tablet Commonly known as:  Elham Crooked TAKE 1 TABLET BY MOUTH EVERY DAY  
  
 trimethoprim-sulfamethoxazole 160-800 mg per tablet Commonly known as:  BACTRIM DS, SEPTRA DS Take 1 Tab by mouth two (2) times a day for 10 days. VITAMIN D3 PO Take 1,000 mg by mouth daily. Prescriptions Sent to Pharmacy Refills  
 trimethoprim-sulfamethoxazole (BACTRIM DS, SEPTRA DS) 160-800 mg per tablet 0 Sig: Take 1 Tab by mouth two (2) times a day for 10 days. Class: Normal  
 Pharmacy: [facility]/pharmacy #[address] [place] [address] Ph #: [phone] Route: Oral  
  
We Performed the Following CBC WITH AUTOMATED DIFF [55323 CPT(R)] METABOLIC PANEL, BASIC [64322 CPT(R)] SED RATE (ESR) [id number] CPT(R)] Follow-up Instructions Return if symptoms worsen or fail to improve. Introducing [facility]! Dear Nesha Cortes: Thank you for requesting a [employer] account. Our records indicate that you already have an active [employer] account. You can access your account anytime at [email or web address] Did you know that you can access your hospital and ER discharge instructions at any time in [employer]? You can also review all of your test results from your hospital stay or ER visit. Additional Information If you have questions, please visit the Frequently Asked Questions section of the [employer] website at [email or web address] Remember, [employer] is NOT to be used for urgent needs. For medical emergencies, dial 911. Now available from your iPhone and Android! Please provide this summary of care documentation to your next provider. Your primary care clinician is listed as Glory Melgoza. If you have any questions after today's visit, please call [phone].

## 2018-03-23 NOTE — PROGRESS NOTES
Health Maintenance Due   Topic Date Due    Hepatitis C Screening  1955    Pneumococcal 19-64 Medium Risk (1 of 1 - PPSV23) 06/22/1976    FOBT Q 1 YEAR AGE 50-75  06/22/2007    ZOSTER VACCINE AGE 60>  04/22/2017       Chief Complaint   Patient presents with    Skin Infection     right foot    Hypertension    Cholesterol Problem       1. Have you been to the ER, urgent care clinic since your last visit? Hospitalized since your last visit? No    2. Have you seen or consulted any other health care providers outside of the [address] [place] [address] since your last visit? Include any pap smears or colon screening. No    3) Do you have an Advance Directive on file? no    4) Are you interested in receiving information on Advance Directives? NO      Patient is accompanied by self I have received verbal consent from Khoa Millan to discuss any/all medical information while they are present in the room.

## 2018-03-23 NOTE — LETTER
4/2/2018 11:20 AM 
 
Ms. Francine Alonso [facility] [address] Dear Marla Rodriguez: 
 
Please find your most recent results below. Resulted Orders CBC WITH AUTOMATED DIFF Result Value Ref Range WBC 8.1 3.4 - 10.8 x10E3/uL  
 RBC 5.31 (H) 3.77 - 5.28 x10E6/uL HGB 15.6 11.1 - 15.9 g/dL HCT 46.2 34.0 - 46.6 % MCV 87 79 - 97 fL  
 MCH 29.4 26.6 - 33.0 pg  
 MCHC 33.8 31.5 - 35.7 g/dL  
 RDW 14.9 12.3 - 15.4 % PLATELET 758 189 - 174 x10E3/uL NEUTROPHILS 63 Not Estab. % Lymphocytes 21 Not Estab. % MONOCYTES 12 Not Estab. % EOSINOPHILS 3 Not Estab. % BASOPHILS 0 Not Estab. %  
 ABS. NEUTROPHILS 5.2 1.4 - 7.0 x10E3/uL Abs Lymphocytes 1.7 0.7 - 3.1 x10E3/uL  
 ABS. MONOCYTES 1.0 (H) 0.1 - 0.9 x10E3/uL  
 ABS. EOSINOPHILS 0.2 0.0 - 0.4 x10E3/uL  
 ABS. BASOPHILS 0.0 0.0 - 0.2 x10E3/uL IMMATURE GRANULOCYTES 1 Not Estab. %  
 ABS. IMM. GRANS. 0.1 0.0 - 0.1 x10E3/uL Narrative Performed at:  [address]  [id number] : Ida Gary MD, Phone:  [phone] METABOLIC PANEL, BASIC Result Value Ref Range Glucose 127 (H) 65 - 99 mg/dL BUN 18 8 - 27 mg/dL Creatinine 0.87 0.57 - 1.00 mg/dL GFR est non-AA 73 >59 mL/min/1.73 GFR est AA 84 >59 mL/min/1.73  
 BUN/Creatinine ratio 21 12 - 28 Sodium 142 134 - 144 mmol/L Potassium 3.3 (L) 3.5 - 5.2 mmol/L Chloride 97 96 - 106 mmol/L  
 CO2 23 18 - 29 mmol/L Calcium 9.8 8.7 - 10.3 mg/dL Narrative Performed at:  [address]  [id number] : Ida Gary MD, Phone:  [phone] SED RATE (ESR) Result Value Ref Range Sed rate (ESR) 16 0 - 40 mm/hr Narrative Performed at:  [address]  [id number] : Ida Gary MD, Phone:  [phone] RECOMMENDATIONS: 
Low potassium o/w stable labs Blood count is normal  
Increase potassium to 2 daily Please call me if you have any questions: [phone] Sincerely, 
 
 
Liza Vazquez, DO

## 2018-03-23 NOTE — PROGRESS NOTES
Patient is a 63 y.o. female presenting with skin infection. The history is provided by the patient. Skin Infection   This is a recurrent problem. The current episode started 2 days ago (Rt foot ). The problem occurs constantly. The problem has been gradually worsening. Associated symptoms comments: Foot pain/ redness/ increase swelling and local tightness. The symptoms are aggravated by walking and standing. She has tried nothing for the symptoms. Past Medical History:   Diagnosis Date    Angina decubitus ([address]     Arthritis     KNEES    Asthma     COPD     Gallstones     H/O seasonal allergies     History of blood transfusion EARLY     [facility], NO REACTION    Hypercholesterolemia     Hypertension     IBS (irritable bowel syndrome)     Morbid obesity (HCC)     Nausea & vomiting     WITH ALL 3 SURGERIES    Obesity     Other ill-defined conditions(799.89)     High cholesterol    Psoriasis     Recurrent umbilical hernia     Sleep apnea     DOESN'T USE CPAP; SLEEPS IN RECLINER FOR 2 YEARS    Unspecified adverse effect of anesthesia     SMALL AIRWAY, ENLARGED TONSILS    URI (upper respiratory infection) 2014        Past Surgical History:   Procedure Laterality Date    HX  SECTION      HX CHOLECYSTECTOMY  2011    by Dr Agatha Laurent Right      - Benign    HX DILATION AND CURETTAGE  10/2012    HX HEART CATHETERIZATION      HX HEENT      WISDOM TEETH EXTRACTION    HX HEENT Bilateral     TAN.  TEAR DUCT TUMOR REMOVAL    HX HEENT      ALL TEETH REMOVED    HX HERNIA REPAIR  3/69/4554    umbilical hernia repair by Dr Kai Sin  12    ventral hernia repair by Dr Mike Nearjean carlos  16    Laparoscopic repair of recurrent incisional hernia with mesh by Dr Loetta Goodell  2012    Dr Gunter Hollow Left    2 PROCEDURES    ORIF L FOOT, 2ND REPAIR    HX WISDOM TEETH EXTRACTION      KNEE ARTHROSCP HARV      KNEE SCOPE,MED OR LAT MENIS REPAIR Bilateral 1980    TAN 2 SEPARATE SURGERIES SAME YEAR    LAP,CHOLECYSTECTOMY  4/27/11         Family History   Problem Relation Age of Onset    Hypertension Mother     Post-op Nausea/Vomiting Mother     Stroke Mother     Post-op Nausea/Vomiting Daughter     Heart Disease Father     No Known Problems Sister     No Known Problems Sister     No Known Problems Sister         Social History     Social History    Marital status:      Spouse name: N/A    Number of children: N/A    Years of education: N/A     Occupational History    Not on file. Social History Main Topics    Smoking status: Current Every Day Smoker     Packs/day: 0.25     Years: 38.00     Types: Cigarettes    Smokeless tobacco: Never Used      Comment: DOWN TO 5 CIGARETTES PER DAY. SMOKED 2 PPD FOR 7 YEARS    Alcohol use No    Drug use: No    Sexual activity: Not on file     Other Topics Concern    Not on file     Social History Narrative                ALLERGIES: Codeine; Pcn [penicillins]; E-mycin e; Scopolamine; and Adhesive tape-silicones    Review of Systems   All other systems reviewed and are negative. Vitals:    03/22/18 1921   BP: (!) 196/92   Pulse: 100   Resp: 18   Temp: 97.7 °F (36.5 °C)   SpO2: 98%   Weight: 292 lb (132.5 kg)   Height: 5' (1.524 m)       Physical Exam   Musculoskeletal: She exhibits edema (on rt leg/ foot- chronic with skin discoloration of lower leg - new erythema of foot with local temprature/ tenderness) and tenderness. Nursing note and vitals reviewed. MDM    Procedures      ICD-10-CM ICD-9-CM    1. Cellulitis of foot, right L03.115 682.7      Medications Ordered Today   Medications    clindamycin (CLEOCIN) 300 mg capsule     Sig: Take 1 Cap by mouth three (3) times daily for 10 days.      Dispense:  30 Cap     Refill:  0     No results found for any visits on 03/22/18. The patients condition was discussed with the patient and they understand. The patient is to follow up with primary care doctor. If signs and symptoms become worse the pt is to go to the ER. The patient is to take medications as prescribed.

## 2018-03-24 LAB
BASOPHILS # BLD AUTO: 0 X10E3/UL (ref 0–0.2)
BASOPHILS NFR BLD AUTO: 0 %
BUN SERPL-MCNC: 18 MG/DL (ref 8–27)
BUN/CREAT SERPL: 21 (ref 12–28)
CALCIUM SERPL-MCNC: 9.8 MG/DL (ref 8.7–10.3)
CHLORIDE SERPL-SCNC: 97 MMOL/L (ref 96–106)
CO2 SERPL-SCNC: 23 MMOL/L (ref 18–29)
CREAT SERPL-MCNC: 0.87 MG/DL (ref 0.57–1)
EOSINOPHIL # BLD AUTO: 0.2 X10E3/UL (ref 0–0.4)
EOSINOPHIL NFR BLD AUTO: 3 %
ERYTHROCYTE [DISTWIDTH] IN BLOOD BY AUTOMATED COUNT: 14.9 % (ref 12.3–15.4)
ERYTHROCYTE [SEDIMENTATION RATE] IN BLOOD BY WESTERGREN METHOD: 16 MM/HR (ref 0–40)
GFR SERPLBLD CREATININE-BSD FMLA CKD-EPI: 73 ML/MIN/1.73
GFR SERPLBLD CREATININE-BSD FMLA CKD-EPI: 84 ML/MIN/1.73
GLUCOSE SERPL-MCNC: 127 MG/DL (ref 65–99)
HCT VFR BLD AUTO: 46.2 % (ref 34–46.6)
HGB BLD-MCNC: 15.6 G/DL (ref 11.1–15.9)
IMM GRANULOCYTES # BLD: 0.1 X10E3/UL (ref 0–0.1)
IMM GRANULOCYTES NFR BLD: 1 %
LYMPHOCYTES # BLD AUTO: 1.7 X10E3/UL (ref 0.7–3.1)
LYMPHOCYTES NFR BLD AUTO: 21 %
MCH RBC QN AUTO: 29.4 PG (ref 26.6–33)
MCHC RBC AUTO-ENTMCNC: 33.8 G/DL (ref 31.5–35.7)
MCV RBC AUTO: 87 FL (ref 79–97)
MONOCYTES # BLD AUTO: 1 X10E3/UL (ref 0.1–0.9)
MONOCYTES NFR BLD AUTO: 12 %
NEUTROPHILS # BLD AUTO: 5.2 X10E3/UL (ref 1.4–7)
NEUTROPHILS NFR BLD AUTO: 63 %
PLATELET # BLD AUTO: 244 X10E3/UL (ref 150–379)
POTASSIUM SERPL-SCNC: 3.3 MMOL/L (ref 3.5–5.2)
RBC # BLD AUTO: 5.31 X10E6/UL (ref 3.77–5.28)
SODIUM SERPL-SCNC: 142 MMOL/L (ref 134–144)
WBC # BLD AUTO: 8.1 X10E3/UL (ref 3.4–10.8)

## 2018-03-25 DIAGNOSIS — E87.6 HYPOKALEMIA: ICD-10-CM

## 2018-03-26 RX ORDER — POTASSIUM CHLORIDE 1500 MG/1
TABLET, EXTENDED RELEASE ORAL
Qty: 30 TAB | Refills: 0 | Status: SHIPPED | OUTPATIENT
Start: 2018-03-26 | End: 2018-04-25 | Stop reason: SDUPTHER

## 2018-03-29 RX ORDER — FUROSEMIDE 20 MG/1
TABLET ORAL
Qty: 30 TAB | Refills: 0 | Status: SHIPPED | OUTPATIENT
Start: 2018-03-29 | End: 2018-04-26 | Stop reason: SDUPTHER

## 2018-03-31 NOTE — PROGRESS NOTES
HISTORY OF PRESENT ILLNESS  Dusty Prajapati is a 63 y.o. female. Pt. comes in for f/u. Has multiple medical problems. Reports noticing redness, swelling and pain in right foot for the past couple of days. Wants to make sure everything will be okay. Had a recent complicated hospitalization with bilateral foot cellulitis and sepsis. Been followed by podiatrist with debridement of calluses. BP and asthma have been stable. She is morbidly obese. Reports compliance with medications and diet. Med list and most recent labs/studies reviewed with pt. Trying to be active physically but not losing weight. Reports no other new c/o. Skin Infection   Associated symptoms include shortness of breath (DANIEL). Pertinent negatives include no chest pain and no headaches. Hypertension    Associated symptoms include shortness of breath (DANIEL). Pertinent negatives include no chest pain, no headaches and no dizziness. Cholesterol Problem   Associated symptoms include shortness of breath (DANIEL). Pertinent negatives include no chest pain and no headaches. Review of Systems   Constitutional: Negative. HENT: Negative. Eyes: Negative. Respiratory: Positive for shortness of breath (DANIEL). Negative for sputum production and wheezing. Cardiovascular: Negative for chest pain and leg swelling. Genitourinary: Negative for dysuria. Musculoskeletal: Positive for joint pain. Negative for back pain and falls. Skin: Positive for rash. Negative for itching. Neurological: Negative for dizziness, sensory change, focal weakness and headaches. Psychiatric/Behavioral: Negative for depression. The patient is not nervous/anxious and does not have insomnia. All other systems reviewed and are negative. Physical Exam   Constitutional: She is oriented to person, place, and time. She appears well-developed and well-nourished. No distress. Pleasant   morbidly obese   HENT:   Head: Normocephalic and atraumatic.    Eyes: Conjunctivae are normal.   Neck: Normal range of motion. Neck supple. No JVD present. No thyromegaly present. Cardiovascular: Normal rate, regular rhythm, normal heart sounds and intact distal pulses. No murmur heard. Pulmonary/Chest: Effort normal and breath sounds normal. No respiratory distress. She has no wheezes. She has no rales. Abdominal: She exhibits no distension. Musculoskeletal: Normal range of motion. She exhibits edema (Trace bilateral pedal). She exhibits no tenderness or deformity. Neurological: She is alert and oriented to person, place, and time. Coordination normal.   Skin: Skin is warm and dry. She is not diaphoretic. There is erythema (Dorsal right foot with tenderness and warmth). Extensive dry white scaly tissue on plantar aspects of both feet which is most consistent with psoriasis  Also psoriatic patches behind her neck, and left elbow   Psychiatric: She has a normal mood and affect. Her behavior is normal.   Nursing note and vitals reviewed. ASSESSMENT and PLAN  Diagnoses and all orders for this visit:    1. Cellulitis of right foot  -     CBC WITH AUTOMATED DIFF  -     METABOLIC PANEL, BASIC  -     SED RATE (ESR)    2. Plantar pustular psoriasis    3. Essential hypertension, benign    4. Mild intermittent asthma without complication    Other orders  -     trimethoprim-sulfamethoxazole (BACTRIM DS, SEPTRA DS) 160-800 mg per tablet; Take 1 Tab by mouth two (2) times a day for 10 days.       Follow-up Disposition:  Return if symptoms worsen or fail to improve.   lab results and schedule of future lab studies reviewed with patient  reviewed diet, exercise and weight control  reviewed medications and side effects in detail  Good skin hygiene discussed

## 2018-04-10 ENCOUNTER — PATIENT OUTREACH (OUTPATIENT)
Dept: OTHER | Age: 63
End: 2018-04-10

## 2018-04-10 NOTE — PROGRESS NOTES
Incoming call from Ms. Kelly/  Previously resolved SUKUMAR episode. 8:45am   Verified  and address for HIPAA security. * Ms Elizabeth Cuadra reports that she appreciated my FU call/ and reports that after antibiotics (clindymicin) and FU with PCP adding bactrim. Antibiotics finished Monday. * Redness on top of foot has resolved and she is feeling much better. * Disappointed that cellulitis reoccurred/ but hopeful that the infection has been appropriately treated and will not return. Not interested in further CM.

## 2018-04-25 DIAGNOSIS — E87.6 HYPOKALEMIA: ICD-10-CM

## 2018-04-25 RX ORDER — POTASSIUM CHLORIDE 1500 MG/1
TABLET, EXTENDED RELEASE ORAL
Qty: 30 TAB | Refills: 0 | Status: SHIPPED | OUTPATIENT
Start: 2018-04-25 | End: 2018-05-23 | Stop reason: SDUPTHER

## 2018-04-26 RX ORDER — FUROSEMIDE 20 MG/1
TABLET ORAL
Qty: 30 TAB | Refills: 0 | Status: SHIPPED | OUTPATIENT
Start: 2018-04-26 | End: 2018-05-29 | Stop reason: SDUPTHER

## 2018-04-30 ENCOUNTER — DOCUMENTATION ONLY (OUTPATIENT)
Dept: URGENT CARE | Age: 63
End: 2018-04-30

## 2018-04-30 ENCOUNTER — OFFICE VISIT (OUTPATIENT)
Dept: URGENT CARE | Age: 63
End: 2018-04-30

## 2018-04-30 VITALS
BODY MASS INDEX: 57.29 KG/M2 | RESPIRATION RATE: 16 BRPM | OXYGEN SATURATION: 97 % | SYSTOLIC BLOOD PRESSURE: 144 MMHG | HEART RATE: 86 BPM | WEIGHT: 291.8 LBS | DIASTOLIC BLOOD PRESSURE: 77 MMHG | TEMPERATURE: 97.8 F | HEIGHT: 60 IN

## 2018-04-30 DIAGNOSIS — R05.9 COUGH: ICD-10-CM

## 2018-04-30 DIAGNOSIS — J44.1 COPD EXACERBATION (HCC): Primary | ICD-10-CM

## 2018-04-30 RX ORDER — IPRATROPIUM BROMIDE AND ALBUTEROL SULFATE 2.5; .5 MG/3ML; MG/3ML
3 SOLUTION RESPIRATORY (INHALATION)
Status: COMPLETED | OUTPATIENT
Start: 2018-04-30 | End: 2018-04-30

## 2018-04-30 RX ORDER — AZITHROMYCIN 250 MG/1
TABLET, FILM COATED ORAL
Qty: 6 TAB | Refills: 0 | Status: SHIPPED | OUTPATIENT
Start: 2018-04-30 | End: 2018-12-05 | Stop reason: ALTCHOICE

## 2018-04-30 RX ORDER — PREDNISONE 20 MG/1
60 TABLET ORAL
Qty: 3 TAB | Refills: 0 | Status: SHIPPED | COMMUNITY
Start: 2018-04-30 | End: 2018-04-30

## 2018-04-30 RX ORDER — ALBUTEROL SULFATE 0.83 MG/ML
2.5 SOLUTION RESPIRATORY (INHALATION)
Qty: 30 EACH | Refills: 0 | Status: SHIPPED | OUTPATIENT
Start: 2018-04-30 | End: 2021-06-15 | Stop reason: SDUPTHER

## 2018-04-30 RX ORDER — PREDNISONE 10 MG/1
TABLET ORAL
Qty: 21 TAB | Refills: 0 | Status: SHIPPED | OUTPATIENT
Start: 2018-04-30 | End: 2018-12-05 | Stop reason: ALTCHOICE

## 2018-04-30 RX ADMIN — IPRATROPIUM BROMIDE AND ALBUTEROL SULFATE 3 ML: 2.5; .5 SOLUTION RESPIRATORY (INHALATION) at 15:54

## 2018-04-30 NOTE — MR AVS SNAPSHOT
[address] [address] 
[phone] Patient: Refugio Sun MRN: [id number] :8/88/1959 Visit Information Date & Time Provider Department Dept. Phone Encounter #  
 4/30/2018  3:15 PM [facility] [phone] [id number] Your Appointments 9/11/2018 11:00 AM  
ROUTINE CARE with Jeri Martinez DO [facility] Internal Medicine ([facility]) Appt Note: 6 month f/u  
 [address] [place] [address]  
[phone]  
  
   
 [address] [facility] Upcoming Health Maintenance Date Due Hepatitis C Screening 1955 Pneumococcal 19-64 Medium Risk (1 of 1 - PPSV23) 6/22/1976 FOBT Q 1 YEAR AGE 50-75 6/22/2007 ZOSTER VACCINE AGE 60> 4/22/2017 Influenza Age 5 to Adult 8/1/2018 BREAST CANCER SCRN MAMMOGRAM 1/18/2019 PAP AKA CERVICAL CYTOLOGY 3/6/2021 DTaP/Tdap/Td series (2 - Td) 12/17/2024 Allergies as of 4/30/2018  Review Complete On: 4/30/2018 By: Cheli Mustafa MD  
  
 Severity Noted Reaction Type Reactions Codeine High 07/23/2010   Intolerance Shortness of Breath, Swelling Pcn [Penicillins] High 07/23/2010   Intolerance Shortness of Breath, Swelling E-mycin E Medium 07/23/2010   Intolerance Hives, Nausea and Vomiting Scopolamine Medium 12/05/2012    Swelling Adhesive Tape-silicones  09/43/0306    Rash, Swelling Current Immunizations  Reviewed on 2/7/2017 Name Date Influenza Vaccine 10/16/2017, 10/15/2014, 10/21/2013 Influenza Vaccine Split 9/5/2011 Tdap 12/17/2014 ZZZ-RETIRED (DO NOT USE) Pneumococcal Vaccine (Unspecified Type)  Deferred (Patient Refused) Not reviewed this visit You Were Diagnosed With   
  
 Codes Comments COPD exacerbation ([facility]    -  Primary ICD-10-CM: J44.1 ICD-9-CM: 491.21 Cough     ICD-10-CM: R05 ICD-9-CM: 099. 2 Vitals BP Pulse Temp Resp Height(growth percentile) Weight(growth percentile) 144/77 86 97.8 °F (36.6 °C) 16 5' (1.524 m) 291 lb 12.8 oz (132.4 kg) SpO2 BMI OB Status Smoking Status 92% 56.99 kg/m2 Hysterectomy Current Every Day Smoker BMI and BSA Data Body Mass Index Body Surface Area 56.99 kg/m 2 2.37 m 2 Preferred Pharmacy Pharmacy Name Phone [facility]/PHARMACY #[id number] [phone] Your Updated Medication List  
  
   
This list is accurate as of 4/30/18  3:49 PM.  Always use your most recent med list.  
  
  
  
  
 aspirin 81 mg chewable tablet Take 81 mg by mouth daily. azithromycin 250 mg tablet Commonly known as:  Selina Ream Take two tablets today then one tablet daily  
  
 bacitracin zinc ointment Commonly known as:  BACITRACIN Apply  to affected area two (2) times a day. clobetasol 0.05 % topical cream  
Commonly known as:  Carlena Kenning Apply  to affected area two (2) times a day. FOR PSORASIS  
  
 furosemide 20 mg tablet Commonly known as:  LASIX TAKE 1 TABLET BY MOUTH DAILY  
  
 ipratropium-albuterol  mcg/actuation inhaler Commonly known as:  COMBIVENT Take 2 Puffs by inhalation every six (6) hours as needed for Wheezing. KLOR-CON M20 20 mEq tablet Generic drug:  potassium chloride TAKE 1 TAB BY MOUTH DAILY. losartan 50 mg tablet Commonly known as:  COZAAR  
TAKE 1 TABLET BY MOUTH DAILY. lovastatin 40 mg tablet Commonly known as:  MEVACOR  
TAKE 1 TABLET BY MOUTH NIGHTLY  
  
 metroNIDAZOLE 1 % topical gel Commonly known as:  Franchester Bishop Apply  to affected area daily. Use a thin layer to affected areas after washing  
  
 montelukast 10 mg tablet Commonly known as:  SINGULAIR  
TAKE 1 TABLET BY MOUTH EVERY DAY * predniSONE 20 mg tablet Commonly known as:  Sherly Derek Take 3 Tabs by mouth now for 1 dose. * predniSONE 10 mg dose pack Commonly known as:  STERAPRED DS Take as directed for 6 days, with food; start 5/1/2018 PROBIOTIC 3 billion cell Cap Generic drug:  lactobacillus combination no. 4  
TAKE 1 CAPSULE BY MOUTH DAILY FOR 7 DAYS  
  
 triamterene-hydroCHLOROthiazide 37.5-25 mg per tablet Commonly known as:  Emperatriz Jones TAKE 1 TABLET BY MOUTH EVERY DAY  
  
 VITAMIN D3 PO Take 1,000 mg by mouth daily. * Notice: This list has 2 medication(s) that are the same as other medications prescribed for you. Read the directions carefully, and ask your doctor or other care provider to review them with you. Prescriptions Sent to Pharmacy Refills  
 predniSONE (STERAPRED DS) 10 mg dose pack 0 Sig: Take as directed for 6 days, with food; start 5/1/2018 Class: Normal  
 Pharmacy: [facility]/pharmacy #[id number] Ph #: [phone]  
 azithromycin (ZITHROMAX) 250 mg tablet 0 Sig: Take two tablets today then one tablet daily Class: Normal  
 Pharmacy: [facility]/pharmacy #[id number] Ph #: [phone] To-Do List   
 04/30/2018 Imaging:  XR CHEST PA LAT Patient Instructions Chronic Obstructive Pulmonary Disease (COPD) Flare-Ups: Care Instructions Your Care Instructions Chronic obstructive pulmonary disease (COPD) is a lung disease that makes it hard to breathe. It is caused by damage to the lungs over many years, usually from smoking. COPD is often a mix of two diseases: · Chronic bronchitis: The airways that carry air to the lungs (bronchial tubes) get inflamed and make a lot of mucus. This can narrow or block the airways. · Emphysema: In a healthy person, the tiny air sacs in the lungs are like balloons. As you breathe in and out, they get bigger and smaller to move air through your lungs.  But with emphysema, these air sacs are damaged and lose their stretch. Less air gets in and out of the lungs. Many people with COPD have attacks called flare-ups or exacerbations. This is when your usual symptoms quickly get worse and stay worse. The doctor has checked you carefully. But problems can develop later. If you notice any problems or new symptoms, get medical treatment right away. Follow-up care is a key part of your treatment and safety. Be sure to make and go to all appointments, and call your doctor if you are having problems. It's also a good idea to know your test results and keep a list of the medicines you take. How can you care for yourself at home? · Be safe with medicines. Take your medicines exactly as prescribed. Call your doctor if you think you are having a problem with your medicine. You may be taking medicines such as: ¨ Bronchodilators. These help open your airways and make breathing easier. ¨ Corticosteroids. These reduce airway inflammation. They may be given as pills, in a vein, or in an inhaled form. You may go home with pills in addition to an inhaler that you already use. · A spacer may help you get more inhaled medicine to your lungs. Ask your doctor or pharmacist if a spacer is right for you. If it is, ask how to use it properly. · If your doctor prescribed antibiotics, take them as directed. Do not stop taking them just because you feel better. You need to take the full course of antibiotics. · If your doctor prescribed oxygen, use the flow rate your doctor has recommended. Do not change it without talking to your doctor first. 
· Do not smoke. Smoking makes COPD worse. If you need help quitting, talk to your doctor about stop-smoking programs and medicines. These can increase your chances of quitting for good. When should you call for help? Call 911 anytime you think you may need emergency care. For example, call if: 
? · You have severe trouble breathing. ?Call your doctor now or seek immediate medical care if: ? · You have new or worse trouble breathing. ? · Your coughing or wheezing gets worse. ? · You cough up dark brown or bloody mucus (sputum). ? · You have a new or higher fever. ? Watch closely for changes in your health, and be sure to contact your doctor if: 
? · You notice more mucus or a change in the color of your mucus. ? · You need to use your antibiotic or steroid pills. ? · You do not get better as expected. Where can you learn more? Go to http://thien-homero.info/. Enter L253 in the search box to learn more about \"Chronic Obstructive Pulmonary Disease (COPD) Flare-Ups: Care Instructions. \" Current as of: May 12, 2017 Content Version: 11.4 © 5666-2850 [employer]. Care instructions adapted under license by [employer] (which disclaims liability or warranty for this information). If you have questions about a medical condition or this instruction, always ask your healthcare professional. [place] [address] any warranty or liability for your use of this information. Introducing [facility] HE[facility]! Dear Luis F Sales: Thank you for requesting a [employer] account. Our records indicate that you already have an active [employer] account. You can access your account anytime at [email or web address] Did you know that you can access your hospital and ER discharge instructions at any time in [employer]? You can also review all of your test results from your hospital stay or ER visit. Additional Information If you have questions, please visit the Frequently Asked Questions section of the [employer] website at [email or web address] Remember, [employer]OS is NOT to be used for urgent needs. For medical emergencies, dial 911. Now available from your iPhone and Android! Please provide this summary of care documentation to your next provider. Your primary care clinician is listed as Gerda Cordero. If you have any questions after today's visit, please call [phone].

## 2018-04-30 NOTE — PROGRESS NOTES
Patient is a 63 y.o. female presenting with cold symptoms. Cold Symptoms   The history is provided by the patient. This is a new problem. Episode onset: 4 days ago. The problem has been gradually worsening. The cough is productive of sputum. There has been no fever. Associated symptoms include rhinorrhea, shortness of breath and wheezing. Pertinent negatives include no chest pain, no chills, no sweats, no ear congestion, no ear pain, no headaches, no sore throat, no myalgias, no nausea and no vomiting. She has tried inhalers for the symptoms. The treatment provided no relief. She is a smoker. Her past medical history is significant for COPD and asthma. Past Medical History:   Diagnosis Date    Angina decubitus ([address]     Arthritis     KNEES    Asthma     COPD     Gallstones     H/O seasonal allergies     History of blood transfusion EARLY     [facility], NO REACTION    Hypercholesterolemia     Hypertension     IBS (irritable bowel syndrome)     Morbid obesity (HCC)     Nausea & vomiting     WITH ALL 3 SURGERIES    Obesity     Other ill-defined conditions(799.89)     High cholesterol    Psoriasis     Recurrent umbilical hernia     Sleep apnea     DOESN'T USE CPAP; SLEEPS IN RECLINER FOR 2 YEARS    Unspecified adverse effect of anesthesia     SMALL AIRWAY, ENLARGED TONSILS    URI (upper respiratory infection) 2014        Past Surgical History:   Procedure Laterality Date    HX  SECTION      HX CHOLECYSTECTOMY  2011    by Dr Navi Reina Right      - Benign    HX DILATION AND CURETTAGE  10/2012    HX HEART CATHETERIZATION      HX HEENT      WISDOM TEETH EXTRACTION    HX HEENT Bilateral     TAN.  TEAR DUCT TUMOR REMOVAL    HX HEENT      ALL TEETH REMOVED    HX HERNIA REPAIR  3/22/3350    umbilical hernia repair by Dr Sayra Dumont  12    ventral hernia repair by Dr Linh Palomino  HX HERNIA REPAIR  6/9/16    Laparoscopic repair of recurrent incisional hernia with mesh by Dr Breanna Tobar  12/1/2012    Dr Leana Luciano Left 2/09   2 PROCEDURES    ORIF L FOOT, 2ND REPAIR    HX WISDOM TEETH EXTRACTION      KNEE ARTHROSCP HARV      KNEE SCOPE,MED OR LAT MENIS REPAIR Bilateral 1980    TAN 2 SEPARATE SURGERIES SAME YEAR    LAP,CHOLECYSTECTOMY  4/27/11         Family History   Problem Relation Age of Onset    Hypertension Mother     Post-op Nausea/Vomiting Mother     Stroke Mother     Post-op Nausea/Vomiting Daughter     Heart Disease Father     No Known Problems Sister     No Known Problems Sister     No Known Problems Sister         Social History     Social History    Marital status:      Spouse name: N/A    Number of children: N/A    Years of education: N/A     Occupational History    Not on file. Social History Main Topics    Smoking status: Current Every Day Smoker     Packs/day: 0.25     Years: 38.00     Types: Cigarettes    Smokeless tobacco: Never Used      Comment: DOWN TO 5 CIGARETTES PER DAY. SMOKED 2 PPD FOR 7 YEARS    Alcohol use No    Drug use: No    Sexual activity: Not on file     Other Topics Concern    Not on file     Social History Narrative                ALLERGIES: Codeine; Pcn [penicillins]; E-mycin e; Scopolamine; and Adhesive tape-silicones    Review of Systems   Constitutional: Negative for activity change, appetite change, chills and fever. HENT: Positive for congestion and rhinorrhea. Negative for ear pain, sinus pain, sinus pressure, sore throat and trouble swallowing. Respiratory: Positive for shortness of breath and wheezing. Negative for cough. Cardiovascular: Negative for chest pain and palpitations. Gastrointestinal: Negative for nausea and vomiting. Musculoskeletal: Negative for myalgias. Neurological: Negative for dizziness and headaches.    Hematological: Negative for adenopathy. Vitals:    04/30/18 1519 04/30/18 1618   BP: 144/77    Pulse: 86    Resp: 16    Temp: 97.8 °F (36.6 °C)    SpO2: 92% 97%   Weight: 291 lb 12.8 oz (132.4 kg)    Height: 5' (1.524 m)      Repeat O2 sat: 97% after duo-neb    Physical Exam   Constitutional: She appears well-developed and well-nourished. No distress. HENT:   Right Ear: Tympanic membrane, external ear and ear canal normal.   Left Ear: Tympanic membrane, external ear and ear canal normal.   Nose: Nose normal. Right sinus exhibits no maxillary sinus tenderness and no frontal sinus tenderness. Left sinus exhibits no maxillary sinus tenderness and no frontal sinus tenderness. Mouth/Throat: Oropharynx is clear and moist and mucous membranes are normal. No oropharyngeal exudate, posterior oropharyngeal edema, posterior oropharyngeal erythema or tonsillar abscesses. Cardiovascular: Normal rate, regular rhythm and normal heart sounds. Pulmonary/Chest: Effort normal. No respiratory distress. She has decreased breath sounds in the right lower field and the left lower field. She has wheezes in the right upper field, the right lower field, the left upper field and the left lower field. She has no rhonchi. She has no rales. Lymphadenopathy:     She has no cervical adenopathy. Neurological: She is alert. Skin: She is not diaphoretic. Psychiatric: She has a normal mood and affect. Her behavior is normal. Judgment and thought content normal.   Nursing note and vitals reviewed. MDM    Procedures      ICD-10-CM ICD-9-CM    1. COPD exacerbation ([facility] J44.1 491.21    2. Cough R05 786.2 XR CHEST PA LAT     Medications Ordered Today   Medications    albuterol-ipratropium (DUO-NEB) 2.5 MG-0.5 MG/3 ML     Order Specific Question:   MODE OF DELIVERY     Answer:   Nebulizer    predniSONE (DELTASONE) 20 mg tablet     Sig: Take 3 Tabs by mouth now for 1 dose.      Dispense:  3 Tab     Refill:  0     Order Specific Question: Expiration Date     Answer:   10/30/2020     Order Specific Question:   Lot#     Answer:   [id number]     Order Specific Question:        Answer:   [facility]    predniSONE (STERAPRED DS) 10 mg dose pack     Sig: Take as directed for 6 days, with food; start 5/1/2018     Dispense:  21 Tab     Refill:  0    azithromycin (ZITHROMAX) 250 mg tablet     Sig: Take two tablets today then one tablet daily     Dispense:  6 Tab     Refill:  0    albuterol (PROVENTIL VENTOLIN) 2.5 mg /3 mL (0.083 %) nebulizer solution     Sig: 3 mL by Nebulization route every six (6) hours as needed for Wheezing. Dispense:  30 Each     Refill:  0     [facility] machine given    The patients condition was discussed with the patient and they understand. The patient is to follow up with PCP. If signs and symptoms become worse the pt is to go to the ER. The patient is to take medications as prescribed. XR Results (most recent):    Results from Appointment encounter on 04/30/18   XR CHEST PA LAT   Narrative Exam:  2 view chest    Indication: Cough and shortness of breath. COMPARISON: 6/1/2017    PA and lateral views demonstrate normal heart size. There is no acute process in  the lung fields. The osseous structures are unremarkable. Impression Impression: No acute process.  No pneumonia

## 2018-04-30 NOTE — LETTER
[address] [address] 
[phone] Work/School Note Date: 4/30/2018 To Whom It May concern: 
 
Kathy Mike was seen and treated today in the urgent care center. Excuse from work today, 4/30/2018. Sincerely, Valerio Joya MD

## 2018-04-30 NOTE — PATIENT INSTRUCTIONS
Chronic Obstructive Pulmonary Disease (COPD) Flare-Ups: Care Instructions  Your Care Instructions    Chronic obstructive pulmonary disease (COPD) is a lung disease that makes it hard to breathe. It is caused by damage to the lungs over many years, usually from smoking. COPD is often a mix of two diseases:  · Chronic bronchitis: The airways that carry air to the lungs (bronchial tubes) get inflamed and make a lot of mucus. This can narrow or block the airways. · Emphysema: In a healthy person, the tiny air sacs in the lungs are like balloons. As you breathe in and out, they get bigger and smaller to move air through your lungs. But with emphysema, these air sacs are damaged and lose their stretch. Less air gets in and out of the lungs. Many people with COPD have attacks called flare-ups or exacerbations. This is when your usual symptoms quickly get worse and stay worse. The doctor has checked you carefully. But problems can develop later. If you notice any problems or new symptoms, get medical treatment right away. Follow-up care is a key part of your treatment and safety. Be sure to make and go to all appointments, and call your doctor if you are having problems. It's also a good idea to know your test results and keep a list of the medicines you take. How can you care for yourself at home? · Be safe with medicines. Take your medicines exactly as prescribed. Call your doctor if you think you are having a problem with your medicine. You may be taking medicines such as:  ¨ Bronchodilators. These help open your airways and make breathing easier. ¨ Corticosteroids. These reduce airway inflammation. They may be given as pills, in a vein, or in an inhaled form. You may go home with pills in addition to an inhaler that you already use. · A spacer may help you get more inhaled medicine to your lungs. Ask your doctor or pharmacist if a spacer is right for you. If it is, ask how to use it properly.   · If your doctor prescribed antibiotics, take them as directed. Do not stop taking them just because you feel better. You need to take the full course of antibiotics. · If your doctor prescribed oxygen, use the flow rate your doctor has recommended. Do not change it without talking to your doctor first.  · Do not smoke. Smoking makes COPD worse. If you need help quitting, talk to your doctor about stop-smoking programs and medicines. These can increase your chances of quitting for good. When should you call for help? Call 911 anytime you think you may need emergency care. For example, call if:  ? · You have severe trouble breathing. ?Call your doctor now or seek immediate medical care if:  ? · You have new or worse trouble breathing. ? · Your coughing or wheezing gets worse. ? · You cough up dark brown or bloody mucus (sputum). ? · You have a new or higher fever. ? Watch closely for changes in your health, and be sure to contact your doctor if:  ? · You notice more mucus or a change in the color of your mucus. ? · You need to use your antibiotic or steroid pills. ? · You do not get better as expected. Where can you learn more? Go to http://thien-homero.info/. Enter V435 in the search box to learn more about \"Chronic Obstructive Pulmonary Disease (COPD) Flare-Ups: Care Instructions. \"  Current as of: May 12, 2017  Content Version: 11.4  © 9734-0078 [employer]wise[employer]. Care instructions adapted under license by [employer] (which disclaims liability or warranty for this information). If you have questions about a medical condition or this instruction, always ask your healthcare professional. [place] [address] any warranty or liability for your use of this information.

## 2018-05-23 DIAGNOSIS — E87.6 HYPOKALEMIA: ICD-10-CM

## 2018-05-23 RX ORDER — POTASSIUM CHLORIDE 1500 MG/1
TABLET, EXTENDED RELEASE ORAL
Qty: 30 TAB | Refills: 0 | Status: SHIPPED | OUTPATIENT
Start: 2018-05-23 | End: 2018-06-20 | Stop reason: SDUPTHER

## 2018-05-29 RX ORDER — FUROSEMIDE 20 MG/1
TABLET ORAL
Qty: 30 TAB | Refills: 0 | Status: SHIPPED | OUTPATIENT
Start: 2018-05-29 | End: 2018-06-26 | Stop reason: SDUPTHER

## 2018-06-20 DIAGNOSIS — E87.6 HYPOKALEMIA: ICD-10-CM

## 2018-06-24 RX ORDER — POTASSIUM CHLORIDE 1500 MG/1
TABLET, EXTENDED RELEASE ORAL
Qty: 30 TAB | Refills: 0 | Status: SHIPPED | OUTPATIENT
Start: 2018-06-24 | End: 2018-07-25 | Stop reason: SDUPTHER

## 2018-06-26 RX ORDER — FUROSEMIDE 20 MG/1
TABLET ORAL
Qty: 30 TAB | Refills: 0 | Status: SHIPPED | OUTPATIENT
Start: 2018-06-26 | End: 2018-07-05 | Stop reason: SDUPTHER

## 2018-07-09 RX ORDER — FUROSEMIDE 20 MG/1
TABLET ORAL
Qty: 30 TAB | Refills: 0 | Status: SHIPPED | OUTPATIENT
Start: 2018-07-09 | End: 2018-08-10 | Stop reason: SDUPTHER

## 2018-07-25 DIAGNOSIS — E87.6 HYPOKALEMIA: ICD-10-CM

## 2018-07-25 RX ORDER — POTASSIUM CHLORIDE 1500 MG/1
TABLET, EXTENDED RELEASE ORAL
Qty: 30 TAB | Refills: 0 | Status: SHIPPED | OUTPATIENT
Start: 2018-07-25 | End: 2018-08-29 | Stop reason: SDUPTHER

## 2018-08-10 RX ORDER — FUROSEMIDE 20 MG/1
TABLET ORAL
Qty: 30 TAB | Refills: 0 | Status: SHIPPED | OUTPATIENT
Start: 2018-08-10 | End: 2018-09-18 | Stop reason: SDUPTHER

## 2018-08-29 DIAGNOSIS — E87.6 HYPOKALEMIA: ICD-10-CM

## 2018-09-02 RX ORDER — POTASSIUM CHLORIDE 1500 MG/1
TABLET, EXTENDED RELEASE ORAL
Qty: 30 TAB | Refills: 0 | Status: SHIPPED | OUTPATIENT
Start: 2018-09-02 | End: 2018-10-08 | Stop reason: SDUPTHER

## 2018-09-15 DIAGNOSIS — I10 ESSENTIAL HYPERTENSION: Chronic | ICD-10-CM

## 2018-09-17 RX ORDER — TRIAMTERENE/HYDROCHLOROTHIAZID 37.5-25 MG
TABLET ORAL
Qty: 30 TAB | Refills: 5 | Status: SHIPPED | OUTPATIENT
Start: 2018-09-17 | End: 2018-12-05 | Stop reason: SDUPTHER

## 2018-09-17 RX ORDER — LOSARTAN POTASSIUM 50 MG/1
TABLET ORAL
Qty: 30 TAB | Refills: 5 | Status: SHIPPED | OUTPATIENT
Start: 2018-09-17 | End: 2018-12-05 | Stop reason: SDUPTHER

## 2018-09-18 RX ORDER — FUROSEMIDE 20 MG/1
TABLET ORAL
Qty: 30 TAB | Refills: 0 | Status: SHIPPED | OUTPATIENT
Start: 2018-09-18 | End: 2018-10-18 | Stop reason: SDUPTHER

## 2018-10-08 DIAGNOSIS — E87.6 HYPOKALEMIA: ICD-10-CM

## 2018-10-09 RX ORDER — POTASSIUM CHLORIDE 1500 MG/1
TABLET, EXTENDED RELEASE ORAL
Qty: 30 TAB | Refills: 0 | Status: SHIPPED | OUTPATIENT
Start: 2018-10-09 | End: 2018-12-05 | Stop reason: SDUPTHER

## 2018-10-14 DIAGNOSIS — E78.00 PURE HYPERCHOLESTEROLEMIA: Chronic | ICD-10-CM

## 2018-10-15 RX ORDER — MONTELUKAST SODIUM 10 MG/1
TABLET ORAL
Qty: 30 TAB | Refills: 5 | Status: SHIPPED | OUTPATIENT
Start: 2018-10-15 | End: 2018-12-05 | Stop reason: SDUPTHER

## 2018-10-15 RX ORDER — LOVASTATIN 40 MG/1
TABLET ORAL
Qty: 30 TAB | Refills: 5 | Status: SHIPPED | OUTPATIENT
Start: 2018-10-15 | End: 2018-12-05 | Stop reason: SDUPTHER

## 2018-10-18 RX ORDER — FUROSEMIDE 20 MG/1
TABLET ORAL
Qty: 30 TAB | Refills: 0 | Status: SHIPPED | OUTPATIENT
Start: 2018-10-18 | End: 2018-11-17 | Stop reason: SDUPTHER

## 2018-11-19 RX ORDER — FUROSEMIDE 20 MG/1
TABLET ORAL
Qty: 30 TAB | Refills: 0 | Status: SHIPPED | OUTPATIENT
Start: 2018-11-19 | End: 2018-12-05 | Stop reason: SDUPTHER

## 2018-12-05 ENCOUNTER — OFFICE VISIT (OUTPATIENT)
Dept: INTERNAL MEDICINE CLINIC | Age: 63
End: 2018-12-05

## 2018-12-05 VITALS
DIASTOLIC BLOOD PRESSURE: 65 MMHG | BODY MASS INDEX: 55.32 KG/M2 | HEART RATE: 84 BPM | HEIGHT: 61 IN | SYSTOLIC BLOOD PRESSURE: 142 MMHG | WEIGHT: 293 LBS | RESPIRATION RATE: 16 BRPM | TEMPERATURE: 95.7 F

## 2018-12-05 DIAGNOSIS — E78.00 PURE HYPERCHOLESTEROLEMIA: Chronic | ICD-10-CM

## 2018-12-05 DIAGNOSIS — E87.6 HYPOKALEMIA: ICD-10-CM

## 2018-12-05 DIAGNOSIS — J44.1 CHRONIC OBSTRUCTIVE PULMONARY DISEASE WITH ACUTE EXACERBATION (HCC): ICD-10-CM

## 2018-12-05 DIAGNOSIS — I10 ESSENTIAL HYPERTENSION: Primary | Chronic | ICD-10-CM

## 2018-12-05 DIAGNOSIS — E66.01 MORBID OBESITY (HCC): ICD-10-CM

## 2018-12-05 DIAGNOSIS — R60.0 LOCALIZED EDEMA: ICD-10-CM

## 2018-12-05 RX ORDER — MONTELUKAST SODIUM 10 MG/1
TABLET ORAL
Qty: 90 TAB | Refills: 1 | Status: SHIPPED | OUTPATIENT
Start: 2018-12-05 | End: 2019-07-30 | Stop reason: SDUPTHER

## 2018-12-05 RX ORDER — LOVASTATIN 40 MG/1
40 TABLET ORAL
Qty: 90 TAB | Refills: 1 | Status: SHIPPED | OUTPATIENT
Start: 2018-12-05 | End: 2019-07-30 | Stop reason: SDUPTHER

## 2018-12-05 RX ORDER — LOSARTAN POTASSIUM 50 MG/1
TABLET ORAL
Qty: 90 TAB | Refills: 1 | Status: SHIPPED | OUTPATIENT
Start: 2018-12-05 | End: 2019-07-23 | Stop reason: SDUPTHER

## 2018-12-05 RX ORDER — POTASSIUM CHLORIDE 20 MEQ/1
TABLET, EXTENDED RELEASE ORAL
Qty: 90 TAB | Refills: 1 | Status: SHIPPED | OUTPATIENT
Start: 2018-12-05 | End: 2019-06-02 | Stop reason: SDUPTHER

## 2018-12-05 RX ORDER — METRONIDAZOLE 10 MG/G
GEL TOPICAL DAILY
Qty: 45 G | Refills: 5 | Status: SHIPPED | OUTPATIENT
Start: 2018-12-05

## 2018-12-05 RX ORDER — FUROSEMIDE 20 MG/1
TABLET ORAL
Qty: 90 TAB | Refills: 1 | Status: SHIPPED | OUTPATIENT
Start: 2018-12-05 | End: 2019-06-13 | Stop reason: SDUPTHER

## 2018-12-05 RX ORDER — TRIAMTERENE/HYDROCHLOROTHIAZID 37.5-25 MG
TABLET ORAL
Qty: 90 TAB | Refills: 1 | Status: SHIPPED | OUTPATIENT
Start: 2018-12-05 | End: 2019-07-23 | Stop reason: SDUPTHER

## 2018-12-05 NOTE — PROGRESS NOTES
Alexa Pandya is a 66 y.o. female Chief Complaint Patient presents with  Hypertension 6 month f/u 1. Have you been to the ER, urgent care clinic since your last visit? Hospitalized since your last visit? No 
 
2. Have you seen or consulted any other health care providers outside of the [address] [place] [address] since your last visit? Include any pap smears or colon screening. No  
 
Visit Vitals /65 (BP 1 Location: Left arm, BP Patient Position: Sitting) Pulse 84 Temp 95.7 °F (35.4 °C) (Oral) Resp 16 Ht 5' 1\" (1.549 m) Wt 301 lb 3.2 oz (136.6 kg) BMI 56.91 kg/m² Health Maintenance Due Topic Date Due  
 Hepatitis C Screening  1955  Pneumococcal 19-64 Medium Risk (1 of 1 - PPSV23) 06/22/1976  Shingrix Vaccine Age 50> (1 of 2) 06/22/2007  FOBT Q 1 YEAR AGE 50-75  06/22/2007  Influenza Age 5 to Adult  08/01/2018  BREAST CANCER SCRN MAMMOGRAM  01/18/2019 Renelda Primrose, LPN

## 2018-12-05 NOTE — PATIENT INSTRUCTIONS
Cristal Buenrostro, RD 
[address] [facility] [address]. [address] [phone] Leg and Ankle Edema: Care Instructions Your Care Instructions Swelling in the legs, ankles, and feet is called edema. It is common after you sit or stand for a while. Long plane flights or car rides often cause swelling in the legs and feet. You may also have swelling if you have to stand for long periods of time at your job. Problems with the veins in the legs (varicose veins) and changes in hormones can also cause swelling. Sometimes the swelling in the ankles and feet is caused by a more serious problem, such as heart failure, infection, blood clots, or liver or kidney disease. Follow-up care is a key part of your treatment and safety. Be sure to make and go to all appointments, and call your doctor if you are having problems. It's also a good idea to know your test results and keep a list of the medicines you take. How can you care for yourself at home? · If your doctor gave you medicine, take it as prescribed. Call your doctor if you think you are having a problem with your medicine. · Whenever you are resting, raise your legs up. Try to keep the swollen area higher than the level of your heart. · Take breaks from standing or sitting in one position. ? Walk around to increase the blood flow in your lower legs. ? Move your feet and ankles often while you stand, or tighten and relax your leg muscles. · Wear support stockings. Put them on in the morning, before swelling gets worse. · Eat a balanced diet. Lose weight if you need to. · Limit the amount of salt (sodium) in your diet. Salt holds fluid in the body and may increase swelling. When should you call for help? Call 911 anytime you think you may need emergency care. For example, call if: 
  · You have symptoms of a blood clot in your lung (called a pulmonary embolism). These may include: 
? Sudden chest pain. ? Trouble breathing. ? Coughing up blood.  Call your doctor now or seek immediate medical care if: 
  · You have signs of a blood clot, such as: 
? Pain in your calf, back of the knee, thigh, or groin. ? Redness and swelling in your leg or groin.  
  · You have symptoms of infection, such as: 
? Increased pain, swelling, warmth, or redness. ? Red streaks or pus. ? A fever.  
 Watch closely for changes in your health, and be sure to contact your doctor if: 
  · Your swelling is getting worse.  
  · You have new or worsening pain in your legs.  
  · You do not get better as expected. Where can you learn more? Go to http://thien-homero.info/. Enter E471 in the search box to learn more about \"Leg and Ankle Edema: Care Instructions. \" Current as of: November 20, 2017 Content Version: 11.8 © 0195-9540 [employer]. Care instructions adapted under license by [employer] (which disclaims liability or warranty for this information). If you have questions about a medical condition or this instruction, always ask your healthcare professional. [address] any warranty or liability for your use of this information. DASH Diet: Care Instructions Your Care Instructions The DASH diet is an eating plan that can help lower your blood pressure. DASH stands for Dietary Approaches to Stop Hypertension. Hypertension is high blood pressure. The DASH diet focuses on eating foods that are high in calcium, potassium, and magnesium. These nutrients can lower blood pressure. The foods that are highest in these nutrients are fruits, vegetables, low-fat dairy products, nuts, seeds, and legumes. But taking calcium, potassium, and magnesium supplements instead of eating foods that are high in those nutrients does not have the same effect. The DASH diet also includes whole grains, fish, and poultry.  
The DASH diet is one of several lifestyle changes your doctor may recommend to lower your high blood pressure. Your doctor may also want you to decrease the amount of sodium in your diet. Lowering sodium while following the DASH diet can lower blood pressure even further than just the DASH diet alone. Follow-up care is a key part of your treatment and safety. Be sure to make and go to all appointments, and call your doctor if you are having problems. It's also a good idea to know your test results and keep a list of the medicines you take. How can you care for yourself at home? Following the DASH diet · Eat 4 to 5 servings of fruit each day. A serving is 1 medium-sized piece of fruit, ½ cup chopped or canned fruit, 1/4 cup dried fruit, or 4 ounces (½ cup) of fruit juice. Choose fruit more often than fruit juice. · Eat 4 to 5 servings of vegetables each day. A serving is 1 cup of lettuce or raw leafy vegetables, ½ cup of chopped or cooked vegetables, or 4 ounces (½ cup) of vegetable juice. Choose vegetables more often than vegetable juice. · Get 2 to 3 servings of low-fat and fat-free dairy each day. A serving is 8 ounces of milk, 1 cup of yogurt, or 1 ½ ounces of cheese. · Eat 6 to 8 servings of grains each day. A serving is 1 slice of bread, 1 ounce of dry cereal, or ½ cup of cooked rice, pasta, or cooked cereal. Try to choose whole-grain products as much as possible. · Limit lean meat, poultry, and fish to 2 servings each day. A serving is 3 ounces, about the size of a deck of cards. · Eat 4 to 5 servings of nuts, seeds, and legumes (cooked dried beans, lentils, and split peas) each week. A serving is 1/3 cup of nuts, 2 tablespoons of seeds, or ½ cup of cooked beans or peas. · Limit fats and oils to 2 to 3 servings each day. A serving is 1 teaspoon of vegetable oil or 2 tablespoons of salad dressing. · Limit sweets and added sugars to 5 servings or less a week. A serving is 1 tablespoon jelly or jam, ½ cup sorbet, or 1 cup of lemonade. · Eat less than 2,300 milligrams (mg) of sodium a day. If you limit your sodium to 1,500 mg a day, you can lower your blood pressure even more. Tips for success · Start small. Do not try to make dramatic changes to your diet all at once. You might feel that you are missing out on your favorite foods and then be more likely to not follow the plan. Make small changes, and stick with them. Once those changes become habit, add a few more changes. · Try some of the following: ? Make it a goal to eat a fruit or vegetable at every meal and at snacks. This will make it easy to get the recommended amount of fruits and vegetables each day. ? Try yogurt topped with fruit and nuts for a snack or healthy dessert. ? Add lettuce, tomato, cucumber, and onion to sandwiches. ? Combine a ready-made pizza crust with low-fat mozzarella cheese and lots of vegetable toppings. Try using tomatoes, squash, spinach, broccoli, carrots, cauliflower, and onions. ? Have a variety of cut-up vegetables with a low-fat dip as an appetizer instead of chips and dip. ? Sprinkle sunflower seeds or chopped almonds over salads. Or try adding chopped walnuts or almonds to cooked vegetables. ? Try some vegetarian meals using beans and peas. Add garbanzo or kidney beans to salads. Make burritos and tacos with mashed jay beans or black beans. Where can you learn more? Go to http://thien-homero.info/. Enter G432 in the search box to learn more about \"DASH Diet: Care Instructions. \" Current as of: December 6, 2017 Content Version: 11.8 © 0663-1837 [employer]. Care instructions adapted under license by [employer] (which disclaims liability or warranty for this information). If you have questions about a medical condition or this instruction, always ask your healthcare professional. [address] any warranty or liability for your use of this information.

## 2018-12-05 NOTE — PROGRESS NOTES
Subjective: Chief Complaint Patient presents with  Hypertension 6 month f/u History of Present Illness Uzma Ramires is a 66y.o. year old female who is a patient of Dr. Pavithra Wynne that presents today for follow-up. She has an extensive PMH. She has a recent hx of cellulitis which required hospitalization. She has not had any problems since. She is taking Lasix to minimize swelling and she is followed by Podiatry to debride her feet. BP is stable on meds. She denies any cardiac complaints. She has a hx of COPD. She has a rescue inhaler. She reports using it a couple times per week. She is not on a LABA. She states if she has to walk any type of distance she has to stop and take breaks. She is morbidly obese and has gained weight since her last visit. She is sedentary. She reports trying to eat healthy. She denies any other new complaints at this time. NAD. No CP, SOB, GI, or  symptoms. Reviewed medications, recent lab work and imaging with patient. Pt reports compliance with medications. Current Outpatient Medications on File Prior to Visit Medication Sig Dispense Refill  albuterol (PROVENTIL VENTOLIN) 2.5 mg /3 mL (0.083 %) nebulizer solution 3 mL by Nebulization route every six (6) hours as needed for Wheezing. 30 Each 0  
 PROBIOTIC 3 billion cell cap TAKE 1 CAPSULE BY MOUTH DAILY FOR 7 DAYS  0  
 bacitracin zinc (BACITRACIN) ointment Apply  to affected area two (2) times a day. 15 g 0  
 ipratropium-albuterol (COMBIVENT)  mcg/actuation inhaler Take 2 Puffs by inhalation every six (6) hours as needed for Wheezing. 1 Inhaler 5  
 CHOLECALCIFEROL, VITAMIN D3, (VITAMIN D-3 PO) Take 1,000 mg by mouth daily.  aspirin 81 mg chewable tablet Take 81 mg by mouth daily.  clobetasol (TEMOVATE) 0.05 % topical cream Apply  to affected area two (2) times a day.  FOR PSORASIS 1 Tube 5  
 
 No current facility-administered medications on file prior to visit. Allergies Allergen Reactions  Codeine Shortness of Breath and Swelling  Pcn [Penicillins] Shortness of Breath and Swelling  E-Mycin E Hives and Nausea and Vomiting  Scopolamine Swelling  Adhesive Tape-Silicones Rash and Swelling Past Medical History:  
Diagnosis Date  Angina decubitus ([facility] [address]  Arthritis KNEES  Asthma  COPD  Gallstones  H/O seasonal allergies  History of blood transfusion EARLY 1980'S  
 [facility], NO REACTION  
 Hypercholesterolemia  Hypertension  IBS (irritable bowel syndrome)  Morbid obesity ([facility] [address]  Nausea & vomiting WITH ALL 3 SURGERIES  Obesity  Other ill-defined conditions(799.89) High cholesterol  Psoriasis  Recurrent umbilical hernia 9/16/5986  Sleep apnea DOESN'T USE CPAP; SLEEPS IN RECLINER FOR 2 YEARS  
 Unspecified adverse effect of anesthesia SMALL AIRWAY, ENLARGED TONSILS  
 URI (upper respiratory infection) 01/2014 Past Surgical History:  
Procedure Laterality Date [address]  HX CHOLECYSTECTOMY  4/27/2011  
 by Dr Nichole Rubin  HX CYST INCISION AND DRAINAGE Right 2010 - Benign  HX DILATION AND CURETTAGE  10/2012 [address] CATHETERIZATION  2011  HX HEENT    
 WISDOM TEETH EXTRACTION  
 HX HEENT Bilateral 1980'S TAN. TEAR DUCT TUMOR REMOVAL  
 HX HEENT ALL TEETH REMOVED  HX HERNIA REPAIR  6/10/7761  
 umbilical hernia repair by Dr Nichole Rubin  HX HERNIA REPAIR  12/1/12  
 ventral hernia repair by Dr Gabrielle Nowak  HX HERNIA REPAIR  6/9/16 Laparoscopic repair of recurrent incisional hernia with mesh by Dr Radha Fernandes  HX HYSTERECTOMY  12/1/2012  Dr Marquez Isaac HX ORTHOPAEDIC Left 2/09   2 PROCEDURES  
 ORIF L FOOT, 2ND REPAIR  
 HX WISDOM TEETH EXTRACTION    
 KNEE ARTHROSCP HARV    
  KNEE SCOPE,MED OR LAT [facility] Bilateral 1980 TAN 2 SEPARATE SURGERIES SAME YEAR  
 LAP,CHOLECYSTECTOMY  4/27/11 Social History Tobacco Use  Smoking status: Current Every Day Smoker Packs/day: 0.25 Years: 38.00 Pack years: 9.50 Types: Cigarettes  Smokeless tobacco: Never Used  Tobacco comment: DOWN TO 5 CIGARETTES PER DAY. SMOKED 2 PPD FOR 7 YEARS Substance Use Topics  Alcohol use: No  
  Alcohol/week: 0.0 oz  Drug use: No  
  
Family History Problem Relation Age of Onset  Hypertension Mother  Post-op Nausea/Vomiting Mother  Stroke Mother  Post-op Nausea/Vomiting Daughter  Heart Disease Father  No Known Problems Sister  No Known Problems Sister  No Known Problems Sister Objective:  
 
Vitals:  
 12/05/18 1020 BP: 142/65 Pulse: 84 Resp: 16 Temp: 95.7 °F (35.4 °C) TempSrc: Oral  
Weight: 301 lb 3.2 oz (136.6 kg) Height: 5' 1\" (1.549 m) Review of Systems Constitutional: Negative. HENT: Negative. Eyes: Negative. Respiratory: Positive for shortness of breath. DANIEL Cardiovascular: Positive for leg swelling. DANIEL 
SOB Gastrointestinal: Negative. Genitourinary: Negative. Musculoskeletal: Negative. Skin: Negative. Neurological: Negative. Psychiatric/Behavioral: Negative. Physical Exam  
Constitutional: She is oriented to person, place, and time. She appears well-developed and well-nourished. Well-appearing morbidly obese  female. NAD HENT:  
Head: Normocephalic and atraumatic. Right Ear: Tympanic membrane normal.  
Left Ear: Tympanic membrane normal.  
Mouth/Throat: Posterior oropharyngeal erythema present. Eyes: Conjunctivae and EOM are normal. Pupils are equal, round, and reactive to light. Neck: Normal range of motion. Neck supple. Cardiovascular: Normal rate, regular rhythm and normal heart sounds. Pulmonary/Chest: Effort normal and breath sounds normal. No respiratory distress. She has no wheezes. Abdominal: Soft. Bowel sounds are normal. She exhibits no distension. There is no tenderness. Musculoskeletal: Normal range of motion. She exhibits edema (Chronic BLE edema. ). She exhibits no tenderness or deformity. Neurological: She is alert and oriented to person, place, and time. Skin: Skin is warm and dry. No rash noted. No erythema. No pallor. Dark patchy areas to bilateral legs. Psychiatric: She has a normal mood and affect. Her behavior is normal.  
Nursing note and vitals reviewed. Assessment/ Plan:  
Diagnoses and all orders for this visit: 1. Essential hypertension Will order 
-     CBC W/O DIFF 
-     METABOLIC PANEL, COMPREHENSIVE 
-     VITAMIN D, 25 HYDROXY 
-     furosemide (LASIX) 20 mg tablet; TAKE 1 TABLET BY MOUTH DAILY 
-     losartan (COZAAR) 50 mg tablet; TAKE 1 TABLET BY MOUTH DAILY. -     triamterene-hydroCHLOROthiazide (MAXZIDE) 37.5-25 mg per tablet; TAKE 1 TABLET BY MOUTH EVERY DAY 
 
2. Hypokalemia Will order 
-     CBC W/O DIFF 
-     METABOLIC PANEL, COMPREHENSIVE 
-     potassium chloride (KLOR-CON M20) 20 mEq tablet; TAKE 1 TAB BY MOUTH DAILY. 3. Pure hypercholesterolemia Will order  
-     CBC W/O DIFF 
-     METABOLIC PANEL, COMPREHENSIVE 
-     LIPID PANEL 
-     lovastatin (MEVACOR) 40 mg tablet; Take 1 Tab by mouth nightly. 4. Chronic obstructive pulmonary disease with acute exacerbation ([facility] Will order 
-     CBC W/O DIFF 
-     METABOLIC PANEL, COMPREHENSIVE 
-     ipratropium-albuterol (COMBIVENT RESPIMAT)  mcg/actuation inhaler; Take 1 Puff by inhalation every six (6) hours. -     umeclidinium-vilanterol (ANORO ELLIPTA) 62.5-25 mcg/actuation inhaler; Take 1 Puff by inhalation daily. 5. Morbid obesity ([facility] Will order 
-     CBC W/O DIFF 
-     METABOLIC PANEL, COMPREHENSIVE 
-     LIPID PANEL 
-     VITAMIN D, 25 HYDROXY -     HEMOGLOBIN A1C WITH EAG 
-     REFERRAL TO DIETITIAN 6. Localized edema Will order 
-     CBC W/O DIFF 
-     METABOLIC PANEL, COMPREHENSIVE 
-     furosemide (LASIX) 20 mg tablet; TAKE 1 TABLET BY MOUTH DAILY Other orders 
-     montelukast (SINGULAIR) 10 mg tablet; TAKE 1 TABLET BY MOUTH EVERY DAY 
-     metroNIDAZOLE (METROGEL) 1 % topical gel; Apply  to affected area daily. Use a thin layer to affected areas after washing Patient's plan of care has been reviewed with them. Patient and/or family have verbally conveyed their understanding and agreement of the patient's signs, symptoms, diagnosis, treatment and prognosis and additionally agree to follow up as recommended or return to [facility] should their condition change prior to follow-up. Discharge instructions have also been provided to the patient with some educational information regarding their diagnosis as well a list of reasons why they would want to return to the office prior to their follow-up appointment should their condition change. Follow-up Disposition: 
Return in about 6 months (around 6/5/2019).

## 2018-12-06 LAB
25(OH)D3+25(OH)D2 SERPL-MCNC: 46.2 NG/ML (ref 30–100)
ALBUMIN SERPL-MCNC: 4.1 G/DL (ref 3.6–4.8)
ALBUMIN/GLOB SERPL: 1.3 {RATIO} (ref 1.2–2.2)
ALP SERPL-CCNC: 114 IU/L (ref 39–117)
ALT SERPL-CCNC: 20 IU/L (ref 0–32)
AST SERPL-CCNC: 21 IU/L (ref 0–40)
BILIRUB SERPL-MCNC: 0.5 MG/DL (ref 0–1.2)
BUN SERPL-MCNC: 23 MG/DL (ref 8–27)
BUN/CREAT SERPL: 28 (ref 12–28)
CALCIUM SERPL-MCNC: 9.8 MG/DL (ref 8.7–10.3)
CHLORIDE SERPL-SCNC: 95 MMOL/L (ref 96–106)
CHOLEST SERPL-MCNC: 151 MG/DL (ref 100–199)
CO2 SERPL-SCNC: 26 MMOL/L (ref 20–29)
CREAT SERPL-MCNC: 0.82 MG/DL (ref 0.57–1)
ERYTHROCYTE [DISTWIDTH] IN BLOOD BY AUTOMATED COUNT: 14.7 % (ref 12.3–15.4)
EST. AVERAGE GLUCOSE BLD GHB EST-MCNC: 126 MG/DL
GLOBULIN SER CALC-MCNC: 3.1 G/DL (ref 1.5–4.5)
GLUCOSE SERPL-MCNC: 105 MG/DL (ref 65–99)
HBA1C MFR BLD: 6 % (ref 4.8–5.6)
HCT VFR BLD AUTO: 45.9 % (ref 34–46.6)
HDLC SERPL-MCNC: 50 MG/DL
HGB BLD-MCNC: 15.3 G/DL (ref 11.1–15.9)
INTERPRETATION, 910389: NORMAL
LDLC SERPL CALC-MCNC: 80 MG/DL (ref 0–99)
MCH RBC QN AUTO: 29.8 PG (ref 26.6–33)
MCHC RBC AUTO-ENTMCNC: 33.3 G/DL (ref 31.5–35.7)
MCV RBC AUTO: 90 FL (ref 79–97)
PLATELET # BLD AUTO: 245 X10E3/UL (ref 150–379)
POTASSIUM SERPL-SCNC: 3.4 MMOL/L (ref 3.5–5.2)
PROT SERPL-MCNC: 7.2 G/DL (ref 6–8.5)
RBC # BLD AUTO: 5.13 X10E6/UL (ref 3.77–5.28)
SODIUM SERPL-SCNC: 142 MMOL/L (ref 134–144)
TRIGL SERPL-MCNC: 103 MG/DL (ref 0–149)
VLDLC SERPL CALC-MCNC: 21 MG/DL (ref 5–40)
WBC # BLD AUTO: 8.7 X10E3/UL (ref 3.4–10.8)

## 2018-12-06 NOTE — PROGRESS NOTES
1.   BS elevated. Watch sweets/carbs. 2.  Kidneys look good. Ok to continue Lasix. 3.  K slightly low at 3.4. Take extra dose of potassium supplement. 4.  Hemoglobin A1c 6.0, prediabetic range. Encourage patient to eat a healther Mediterranean style diet with 55% or less of calories from carbohydrates. Try to eat more vegetables, whole fruit, nuts, whole grains, yogurt and less refined grains. Eat less red meat. Chicken and fish would be good protein sources. Aim to eat less than 45 grams of carbohydrates per meal. 
Other labs stable.

## 2019-01-21 DIAGNOSIS — J44.1 CHRONIC OBSTRUCTIVE PULMONARY DISEASE WITH ACUTE EXACERBATION (HCC): ICD-10-CM

## 2019-02-07 ENCOUNTER — PATIENT MESSAGE (OUTPATIENT)
Dept: INTERNAL MEDICINE CLINIC | Age: 64
End: 2019-02-07

## 2019-04-15 DIAGNOSIS — J44.1 CHRONIC OBSTRUCTIVE PULMONARY DISEASE WITH ACUTE EXACERBATION (HCC): ICD-10-CM

## 2019-04-16 RX ORDER — IPRATROPIUM BROMIDE AND ALBUTEROL 20; 100 UG/1; UG/1
SPRAY, METERED RESPIRATORY (INHALATION)
Qty: 1 INHALER | Refills: 2 | Status: SHIPPED | OUTPATIENT
Start: 2019-04-16 | End: 2020-03-10 | Stop reason: SDUPTHER

## 2019-06-02 DIAGNOSIS — E87.6 HYPOKALEMIA: ICD-10-CM

## 2019-06-03 RX ORDER — POTASSIUM CHLORIDE 20 MEQ/1
TABLET, EXTENDED RELEASE ORAL
Qty: 90 TAB | Refills: 1 | Status: SHIPPED | OUTPATIENT
Start: 2019-06-03 | End: 2020-03-10 | Stop reason: SDUPTHER

## 2019-06-13 DIAGNOSIS — I10 ESSENTIAL HYPERTENSION: Chronic | ICD-10-CM

## 2019-06-13 DIAGNOSIS — R60.0 LOCALIZED EDEMA: ICD-10-CM

## 2019-06-14 RX ORDER — FUROSEMIDE 20 MG/1
TABLET ORAL
Qty: 90 TAB | Refills: 1 | Status: SHIPPED | OUTPATIENT
Start: 2019-06-14 | End: 2019-11-19 | Stop reason: SDUPTHER

## 2019-07-30 ENCOUNTER — OFFICE VISIT (OUTPATIENT)
Dept: INTERNAL MEDICINE CLINIC | Age: 64
End: 2019-07-30

## 2019-07-30 VITALS
BODY MASS INDEX: 55.32 KG/M2 | HEART RATE: 62 BPM | RESPIRATION RATE: 20 BRPM | HEIGHT: 61 IN | OXYGEN SATURATION: 94 % | DIASTOLIC BLOOD PRESSURE: 62 MMHG | SYSTOLIC BLOOD PRESSURE: 118 MMHG | TEMPERATURE: 97.6 F | WEIGHT: 293 LBS

## 2019-07-30 DIAGNOSIS — Z12.31 ENCOUNTER FOR SCREENING MAMMOGRAM FOR BREAST CANCER: ICD-10-CM

## 2019-07-30 DIAGNOSIS — G47.33 OSA (OBSTRUCTIVE SLEEP APNEA): ICD-10-CM

## 2019-07-30 DIAGNOSIS — E78.00 PURE HYPERCHOLESTEROLEMIA: ICD-10-CM

## 2019-07-30 DIAGNOSIS — E87.6 HYPOKALEMIA: ICD-10-CM

## 2019-07-30 DIAGNOSIS — I10 ESSENTIAL HYPERTENSION: Primary | ICD-10-CM

## 2019-07-30 DIAGNOSIS — R73.03 PREDIABETES: ICD-10-CM

## 2019-07-30 DIAGNOSIS — J45.20 MILD INTERMITTENT ASTHMA WITHOUT COMPLICATION: ICD-10-CM

## 2019-07-30 DIAGNOSIS — Z11.59 NEED FOR HEPATITIS C SCREENING TEST: ICD-10-CM

## 2019-07-30 DIAGNOSIS — E66.01 MORBID OBESITY (HCC): ICD-10-CM

## 2019-07-30 RX ORDER — MONTELUKAST SODIUM 10 MG/1
TABLET ORAL
Qty: 90 TAB | Refills: 1 | Status: SHIPPED | OUTPATIENT
Start: 2019-07-30 | End: 2020-03-10 | Stop reason: SDUPTHER

## 2019-07-30 RX ORDER — LOVASTATIN 40 MG/1
40 TABLET ORAL
Qty: 90 TAB | Refills: 1 | Status: SHIPPED | OUTPATIENT
Start: 2019-07-30 | End: 2020-02-06

## 2019-07-30 NOTE — PROGRESS NOTES
Carrillo Blunt is a 60 y.o. female    Chief Complaint   Patient presents with    Hypertension    Cholesterol Problem    COPD     1. Have you been to the ER, urgent care clinic since your last visit? Hospitalized since your last visit? No     2. Have you seen or consulted any other health care providers outside of the [address] [place] [address] since your last visit? Include any pap smears or colon screening.   No     Visit Vitals  /62   Pulse 62   Temp 97.6 °F (36.4 °C) (Oral)   Resp 20   Ht 5' 1\" (1.549 m)   Wt 300 lb 3.2 oz (136.2 kg)   SpO2 94%   BMI 56.72 kg/m²

## 2019-07-31 LAB
ALT SERPL-CCNC: 22 IU/L (ref 0–32)
AST SERPL-CCNC: 21 IU/L (ref 0–40)
BUN SERPL-MCNC: 23 MG/DL (ref 8–27)
BUN/CREAT SERPL: 29 (ref 12–28)
CALCIUM SERPL-MCNC: 9.7 MG/DL (ref 8.7–10.3)
CHLORIDE SERPL-SCNC: 97 MMOL/L (ref 96–106)
CHOLEST SERPL-MCNC: 133 MG/DL (ref 100–199)
CO2 SERPL-SCNC: 25 MMOL/L (ref 20–29)
CREAT SERPL-MCNC: 0.79 MG/DL (ref 0.57–1)
EST. AVERAGE GLUCOSE BLD GHB EST-MCNC: 131 MG/DL
GLUCOSE SERPL-MCNC: 94 MG/DL (ref 65–99)
HBA1C MFR BLD: 6.2 % (ref 4.8–5.6)
HCV AB S/CO SERPL IA: <0.1 S/CO RATIO (ref 0–0.9)
HDLC SERPL-MCNC: 44 MG/DL
INTERPRETATION, 910389: NORMAL
LDLC SERPL CALC-MCNC: 68 MG/DL (ref 0–99)
POTASSIUM SERPL-SCNC: 3.3 MMOL/L (ref 3.5–5.2)
SODIUM SERPL-SCNC: 140 MMOL/L (ref 134–144)
TRIGL SERPL-MCNC: 104 MG/DL (ref 0–149)
VLDLC SERPL CALC-MCNC: 21 MG/DL (ref 5–40)

## 2019-07-31 NOTE — PROGRESS NOTES
HISTORY OF PRESENT ILLNESS  Robbin Colon is a 60 y.o. female. Pt. comes in for f/u. Has multiple medical problems. BP and asthma have been stable. She is morbidly obese and not losing weight. Has sleep apnea but not using CPAP. Denies tobacco or alcohol use. Reports compliance with medications and diet. Med list and most recent labs/studies reviewed with pt. Trying to be active physically to control weight. Needs med refills. Due for repeat labs. Reports no other new c/o. HPI    Review of Systems   Constitutional: Negative. HENT: Negative. Eyes: Negative. Respiratory: Positive for shortness of breath (DANIEL). Negative for sputum production and wheezing. Cardiovascular: Negative for chest pain and leg swelling. Genitourinary: Negative for dysuria. Musculoskeletal: Positive for joint pain. Negative for back pain and falls. Skin: Negative. Neurological: Negative for dizziness, sensory change, focal weakness and headaches. Endo/Heme/Allergies: Negative. Psychiatric/Behavioral: Negative for depression. The patient is not nervous/anxious and does not have insomnia. All other systems reviewed and are negative. Physical Exam   Constitutional: She is oriented to person, place, and time. She appears well-developed and well-nourished. No distress. Pleasant   morbidly obese   HENT:   Head: Normocephalic and atraumatic. Eyes: Conjunctivae are normal. No scleral icterus. Neck: Normal range of motion. Neck supple. No JVD present. No thyromegaly present. Cardiovascular: Normal rate, regular rhythm, normal heart sounds and intact distal pulses. No murmur heard. Pulmonary/Chest: Effort normal and breath sounds normal. No respiratory distress. She has no wheezes. She has no rales. Abdominal: She exhibits no distension. Musculoskeletal: Normal range of motion. She exhibits edema (Trace bilateral pedal). She exhibits no tenderness or deformity.    Neurological: She is alert and oriented to person, place, and time. Coordination normal.   Skin: Skin is warm and dry. She is not diaphoretic. Psoriatic spots   Psychiatric: She has a normal mood and affect. Her behavior is normal.   Nursing note and vitals reviewed. ASSESSMENT and PLAN  Diagnoses and all orders for this visit:    1. Essential hypertension    2. Morbid obesity ([address]  -     REFERRAL TO DIETITIAN    3. Pure hypercholesterolemia  -     LIPID PANEL  -     METABOLIC PANEL, BASIC  -     ALT  -     AST  -     REFERRAL TO DIETITIAN  -     lovastatin (MEVACOR) 40 mg tablet; Take 1 Tab by mouth nightly. 4. Mild intermittent asthma without complication    5. Hypokalemia    6. ADORE (obstructive sleep apnea)    7. Encounter for screening mammogram for breast cancer  -     [facility] MAMMO BI SCREENING INCL CAD; Future    8. Prediabetes  -     HEMOGLOBIN A1C WITH EAG  -     REFERRAL TO DIETITIAN    9. Need for hepatitis C screening test  -     HEPATITIS C AB    Other orders  -     montelukast (SINGULAIR) 10 mg tablet; TAKE 1 TABLET BY MOUTH EVERY DAY  -     CVD REPORT      Follow-up and Dispositions    · Return in about 6 months (around 1/30/2020).      lab results and schedule of future lab studies reviewed with patient  reviewed diet, exercise and weight control  reviewed medications and side effects in detail  F/u with other MD's as scheduled  Advised patient to lose weight by watching diet (decreasing sugars/carbs/fat, increasing fruits/vegetables), exercising at least 30 minutes daily, getting 7-8 hours of sleep daily, drinking plenty of water, and decreasing stress  Overall stable

## 2019-08-05 NOTE — PROGRESS NOTES
Low potassium --> increase KCl to 40 M EQ daily  Recheck potassium in 7 to 10 days    A1c is slightly elevated consistent with prediabetes  All other labs are stable

## 2019-08-31 DIAGNOSIS — I10 ESSENTIAL HYPERTENSION: Chronic | ICD-10-CM

## 2019-09-03 RX ORDER — LOSARTAN POTASSIUM 50 MG/1
TABLET ORAL
Qty: 90 TAB | Refills: 1 | Status: SHIPPED | OUTPATIENT
Start: 2019-09-03 | End: 2020-03-10 | Stop reason: SDUPTHER

## 2019-09-03 RX ORDER — TRIAMTERENE/HYDROCHLOROTHIAZID 37.5-25 MG
TABLET ORAL
Qty: 90 TAB | Refills: 1 | Status: SHIPPED | OUTPATIENT
Start: 2019-09-03 | End: 2020-03-10 | Stop reason: SDUPTHER

## 2019-11-19 DIAGNOSIS — I10 ESSENTIAL HYPERTENSION: Chronic | ICD-10-CM

## 2019-11-19 DIAGNOSIS — R60.0 LOCALIZED EDEMA: ICD-10-CM

## 2019-11-19 RX ORDER — FUROSEMIDE 20 MG/1
TABLET ORAL
Qty: 90 TAB | Refills: 1 | Status: SHIPPED | OUTPATIENT
Start: 2019-11-19 | End: 2020-03-10 | Stop reason: SDUPTHER

## 2020-02-06 DIAGNOSIS — E78.00 PURE HYPERCHOLESTEROLEMIA: ICD-10-CM

## 2020-02-06 RX ORDER — LOVASTATIN 40 MG/1
TABLET ORAL
Qty: 90 TAB | Refills: 1 | Status: SHIPPED | OUTPATIENT
Start: 2020-02-06 | End: 2020-03-10 | Stop reason: SDUPTHER

## 2020-03-10 ENCOUNTER — OFFICE VISIT (OUTPATIENT)
Dept: INTERNAL MEDICINE CLINIC | Age: 65
End: 2020-03-10

## 2020-03-10 VITALS
SYSTOLIC BLOOD PRESSURE: 120 MMHG | OXYGEN SATURATION: 97 % | HEART RATE: 88 BPM | DIASTOLIC BLOOD PRESSURE: 74 MMHG | RESPIRATION RATE: 18 BRPM | WEIGHT: 291 LBS | TEMPERATURE: 98 F | HEIGHT: 61 IN | BODY MASS INDEX: 54.94 KG/M2

## 2020-03-10 DIAGNOSIS — Z72.0 TOBACCO USE: ICD-10-CM

## 2020-03-10 DIAGNOSIS — R73.03 PREDIABETES: ICD-10-CM

## 2020-03-10 DIAGNOSIS — J44.9 CHRONIC OBSTRUCTIVE PULMONARY DISEASE, UNSPECIFIED COPD TYPE (HCC): ICD-10-CM

## 2020-03-10 DIAGNOSIS — J44.1 CHRONIC OBSTRUCTIVE PULMONARY DISEASE WITH ACUTE EXACERBATION (HCC): ICD-10-CM

## 2020-03-10 DIAGNOSIS — I10 ESSENTIAL HYPERTENSION: Primary | ICD-10-CM

## 2020-03-10 DIAGNOSIS — E66.01 MORBID OBESITY (HCC): ICD-10-CM

## 2020-03-10 DIAGNOSIS — Z23 ENCOUNTER FOR IMMUNIZATION: ICD-10-CM

## 2020-03-10 DIAGNOSIS — E78.00 PURE HYPERCHOLESTEROLEMIA: ICD-10-CM

## 2020-03-10 DIAGNOSIS — E87.6 HYPOKALEMIA: ICD-10-CM

## 2020-03-10 DIAGNOSIS — R60.0 LOCALIZED EDEMA: ICD-10-CM

## 2020-03-10 RX ORDER — FUROSEMIDE 20 MG/1
TABLET ORAL
Qty: 90 TAB | Refills: 1 | Status: SHIPPED | OUTPATIENT
Start: 2020-03-10 | End: 2020-09-22 | Stop reason: SDUPTHER

## 2020-03-10 RX ORDER — LOVASTATIN 40 MG/1
TABLET ORAL
Qty: 90 TAB | Refills: 1 | Status: SHIPPED | OUTPATIENT
Start: 2020-03-10 | End: 2020-09-22 | Stop reason: SDUPTHER

## 2020-03-10 RX ORDER — POTASSIUM CHLORIDE 20 MEQ/1
TABLET, EXTENDED RELEASE ORAL
Qty: 90 TAB | Refills: 1 | Status: SHIPPED | OUTPATIENT
Start: 2020-03-10 | End: 2020-05-08

## 2020-03-10 RX ORDER — TRIAMTERENE/HYDROCHLOROTHIAZID 37.5-25 MG
TABLET ORAL
Qty: 90 TAB | Refills: 1 | Status: SHIPPED | OUTPATIENT
Start: 2020-03-10 | End: 2020-09-22 | Stop reason: SDUPTHER

## 2020-03-10 RX ORDER — MONTELUKAST SODIUM 10 MG/1
TABLET ORAL
Qty: 90 TAB | Refills: 1 | Status: SHIPPED | OUTPATIENT
Start: 2020-03-10 | End: 2020-09-22 | Stop reason: SDUPTHER

## 2020-03-10 NOTE — PROGRESS NOTES
Identified pt with two pt identifiers(name and ). Reviewed record in preparation for visit and have obtained necessary documentation. Chief Complaint   Patient presents with    COPD    Hypertension        Health Maintenance Due   Topic    Pneumococcal 0-64 years (1 of 1 - PPSV23)    Colonoscopy     Shingrix Vaccine Age 49> (1 of 2)    Breast Cancer Screen Mammogram     Influenza Age 5 to Adult         Visit Vitals  /74 (BP 1 Location: Left arm, BP Patient Position: Sitting)   Pulse 88   Temp 98 °F (36.7 °C) (Oral)   Resp 18   Ht 5' 1\" (1.549 m)   Wt 291 lb (132 kg)   SpO2 97%   BMI 54.98 kg/m²     Pain Scale: 0 - No pain/10    Coordination of Care Questionnaire:  :   1. Have you been to the ER, urgent care clinic since your last visit? Hospitalized since your last visit? No    2. Have you seen or consulted any other health care providers outside of the [address] [place] [address] since your last visit? Include any pap smears or colon screening.  No

## 2020-03-10 NOTE — PROGRESS NOTES
HISTORY OF PRESENT ILLNESS  Guillaume Gary is a 60 y.o. female. Pt. comes in for f/u. Has a few chronic medical issues as documented. She has chronic respiratory use of COPD and asthma. Reports doing well recently. No recent hospitalizations. She is morbidly obese but has lost some weight. Continues to smoke daily. It is stable. Has long history of hypokalemia. Last potassium level was 3.3. On supplements. Also has prediabetes. Lab was 6.2. She has ADORE but not using CPAP on a regular basis. Also reports having increased pedal edema. Diuretics have helped in the past.    PMH/PSH/Allergies/Social History/medication list and most recent studies reviewed with patient. Tobacco use: Yes   alcohol use: Social    Reports compliance with medications and diet. Trying to be active physically to control weight. Reports no other new c/o. HPI    Review of Systems   Constitutional: Negative. HENT: Negative. Eyes: Negative. Respiratory: Positive for shortness of breath (DANIEL). Negative for sputum production and wheezing. Cardiovascular: Negative for chest pain and leg swelling. Genitourinary: Negative for dysuria. Musculoskeletal: Positive for joint pain. Negative for back pain and falls. Skin: Negative. Neurological: Negative for dizziness, sensory change, focal weakness and headaches. Endo/Heme/Allergies: Negative. Psychiatric/Behavioral: Negative for depression. The patient is not nervous/anxious and does not have insomnia. All other systems reviewed and are negative. Physical Exam  Vitals signs and nursing note reviewed. Constitutional:       General: She is not in acute distress. Appearance: She is well-developed. She is not diaphoretic. Comments: Pleasant   morbidly obese   HENT:      Head: Normocephalic and atraumatic. Nose: Nose normal.      Mouth/Throat:      Mouth: Mucous membranes are dry. Eyes:      General: No scleral icterus. Conjunctiva/sclera: Conjunctivae normal.   Neck:      Musculoskeletal: Normal range of motion and neck supple. Thyroid: No thyromegaly. Vascular: No JVD. Cardiovascular:      Rate and Rhythm: Normal rate and regular rhythm. Heart sounds: Normal heart sounds. No murmur. Pulmonary:      Effort: Pulmonary effort is normal. No respiratory distress. Breath sounds: Normal breath sounds. No wheezing or rales. Abdominal:      General: There is no distension. Comments: obese   Musculoskeletal: Normal range of motion. General: No tenderness or deformity. Right lower leg: Edema present. Left lower leg: Edema present. Skin:     General: Skin is warm and dry. Findings: No rash. Comments: Psoriatic spots  Chronic hyperpigmentation of distal legs   Neurological:      Mental Status: She is alert and oriented to person, place, and time. Coordination: Coordination normal.   Psychiatric:         Behavior: Behavior normal.         ASSESSMENT and PLAN  Diagnoses and all orders for this visit:    1. Essential hypertension  -     triamterene-hydroCHLOROthiazide (MAXZIDE) 37.5-25 mg per tablet; TAKE 1 TABLET BY MOUTH EVERY DAY  -     furosemide (LASIX) 20 mg tablet; TAKE 1 TABLET BY MOUTH EVERY DAY    2. Pure hypercholesterolemia  -     LIPID PANEL  -     METABOLIC PANEL, BASIC  -     ALT  -     AST  -     lovastatin (MEVACOR) 40 mg tablet; TAKE 1 TABLET BY MOUTH EVERY DAY AT NIGHT    3. Morbid obesity ([address]    4. Prediabetes  -     HEMOGLOBIN A1C WITH EAG  -     MICROALBUMIN, UR, RAND W/ MICROALB/CREAT RATIO    5. Chronic obstructive pulmonary disease, unspecified COPD type ([address]    6. Encounter for immunization  -     PNEUMOCOCCAL POLYSACCHARIDE VACCINE, 23-VALENT, ADULT OR IMMUNOSUPPRESSED PT DOSE,    7.  Chronic obstructive pulmonary disease with acute exacerbation (HCC)  -     ipratropium-albuteroL (COMBIVENT RESPIMAT)  mcg/actuation inhaler; TAKE 1 PUFF BY INHALATION EVERY SIX (6) HOURS. 8. Localized edema  -     furosemide (LASIX) 20 mg tablet; TAKE 1 TABLET BY MOUTH EVERY DAY    9. Hypokalemia  -     potassium chloride (KLOR-CON M20) 20 mEq tablet; TAKE 1 TABLET BY MOUTH EVERY DAY    10. Tobacco use    Other orders  -     montelukast (SINGULAIR) 10 mg tablet; TAKE 1 TABLET BY MOUTH EVERY DAY      Follow-up and Dispositions    · Return in about 6 months (around 9/10/2020).      lab results and schedule of future lab studies reviewed with patient  reviewed diet, exercise and weight control  reviewed medications and side effects in detail  F/u with other MD's as scheduled  Advised patient to lose weight by watching diet (decreasing sugars/carbs/fat, increasing fruits/vegetables), exercising at least 30 minutes daily, getting 7-8 hours of sleep daily, drinking plenty of water, and decreasing stress  Advised pt strongly to stop smoking

## 2020-03-11 LAB
ALBUMIN/CREAT UR: <18 MG/G CREAT (ref 0–29)
ALT SERPL-CCNC: 21 IU/L (ref 0–32)
AST SERPL-CCNC: 18 IU/L (ref 0–40)
BUN SERPL-MCNC: 24 MG/DL (ref 8–27)
BUN/CREAT SERPL: 23 (ref 12–28)
CALCIUM SERPL-MCNC: 9.5 MG/DL (ref 8.7–10.3)
CHLORIDE SERPL-SCNC: 93 MMOL/L (ref 96–106)
CHOLEST SERPL-MCNC: 128 MG/DL (ref 100–199)
CO2 SERPL-SCNC: 28 MMOL/L (ref 20–29)
CREAT SERPL-MCNC: 1.03 MG/DL (ref 0.57–1)
CREAT UR-MCNC: 16.9 MG/DL
EST. AVERAGE GLUCOSE BLD GHB EST-MCNC: 131 MG/DL
GLUCOSE SERPL-MCNC: 97 MG/DL (ref 65–99)
HBA1C MFR BLD: 6.2 % (ref 4.8–5.6)
HDLC SERPL-MCNC: 46 MG/DL
INTERPRETATION, 910389: NORMAL
INTERPRETATION: NORMAL
LDLC SERPL CALC-MCNC: 56 MG/DL (ref 0–99)
MICROALBUMIN UR-MCNC: <3 UG/ML
PDF IMAGE, 910387: NORMAL
POTASSIUM SERPL-SCNC: 3.2 MMOL/L (ref 3.5–5.2)
SODIUM SERPL-SCNC: 140 MMOL/L (ref 134–144)
TRIGL SERPL-MCNC: 131 MG/DL (ref 0–149)
VLDLC SERPL CALC-MCNC: 26 MG/DL (ref 5–40)

## 2020-03-13 NOTE — PROGRESS NOTES
Chronically low potassium  Tell patient to double up on her potassium pills  Recheck BMP in 2 weeks    All other labs are stable

## 2020-04-08 ENCOUNTER — TELEPHONE (OUTPATIENT)
Dept: INTERNAL MEDICINE CLINIC | Age: 65
End: 2020-04-08

## 2020-04-08 DIAGNOSIS — E87.6 CHRONICALLY LOW SERUM POTASSIUM: Primary | ICD-10-CM

## 2020-04-08 NOTE — PROGRESS NOTES
DO Reed Mahoney LPN             Chronically low potassium   Tell patient to double up on her potassium pills   Recheck BMP in 2 weeks     All other labs are stable

## 2020-04-08 NOTE — TELEPHONE ENCOUNTER
Spoke to patient regarding lab results, Pt aware of providers note to double medication, orders placed for lab in 2 weeks, pt voiced understanding.

## 2020-04-14 DIAGNOSIS — I10 ESSENTIAL HYPERTENSION: Chronic | ICD-10-CM

## 2020-04-14 RX ORDER — LOSARTAN POTASSIUM 50 MG/1
TABLET ORAL
Qty: 90 TAB | Refills: 1 | Status: SHIPPED | OUTPATIENT
Start: 2020-04-14 | End: 2020-04-15 | Stop reason: RX

## 2020-04-15 DIAGNOSIS — I10 ESSENTIAL HYPERTENSION, BENIGN: Primary | ICD-10-CM

## 2020-04-15 RX ORDER — OLMESARTAN MEDOXOMIL 40 MG/1
40 TABLET ORAL DAILY
Qty: 30 TAB | Refills: 2 | Status: SHIPPED | OUTPATIENT
Start: 2020-04-15 | End: 2020-09-22 | Stop reason: SDUPTHER

## 2020-05-08 DIAGNOSIS — E87.6 HYPOKALEMIA: ICD-10-CM

## 2020-05-08 RX ORDER — POTASSIUM CHLORIDE 20 MEQ/1
TABLET, EXTENDED RELEASE ORAL
Qty: 90 TAB | Refills: 1 | Status: SHIPPED | OUTPATIENT
Start: 2020-05-08 | End: 2020-10-19

## 2020-09-22 ENCOUNTER — VIRTUAL VISIT (OUTPATIENT)
Dept: INTERNAL MEDICINE CLINIC | Age: 65
End: 2020-09-22
Payer: COMMERCIAL

## 2020-09-22 DIAGNOSIS — I10 ESSENTIAL HYPERTENSION, BENIGN: Primary | ICD-10-CM

## 2020-09-22 DIAGNOSIS — E78.00 PURE HYPERCHOLESTEROLEMIA: ICD-10-CM

## 2020-09-22 DIAGNOSIS — R73.03 PREDIABETES: ICD-10-CM

## 2020-09-22 DIAGNOSIS — R60.0 LOCALIZED EDEMA: ICD-10-CM

## 2020-09-22 DIAGNOSIS — J44.9 CHRONIC OBSTRUCTIVE PULMONARY DISEASE, UNSPECIFIED COPD TYPE (HCC): ICD-10-CM

## 2020-09-22 DIAGNOSIS — E66.01 MORBID OBESITY (HCC): ICD-10-CM

## 2020-09-22 PROCEDURE — 99214 OFFICE O/P EST MOD 30 MIN: CPT | Performed by: INTERNAL MEDICINE

## 2020-09-22 RX ORDER — LOSARTAN POTASSIUM 50 MG/1
50 TABLET ORAL DAILY
Qty: 90 TAB | Refills: 1 | Status: SHIPPED | OUTPATIENT
Start: 2020-09-22 | End: 2021-05-24

## 2020-09-22 RX ORDER — TRIAMTERENE/HYDROCHLOROTHIAZID 37.5-25 MG
TABLET ORAL
Qty: 90 TAB | Refills: 1 | Status: SHIPPED | OUTPATIENT
Start: 2020-09-22 | End: 2021-05-24

## 2020-09-22 RX ORDER — LOVASTATIN 40 MG/1
TABLET ORAL
Qty: 90 TAB | Refills: 1 | Status: SHIPPED | OUTPATIENT
Start: 2020-09-22 | End: 2021-05-24

## 2020-09-22 RX ORDER — MONTELUKAST SODIUM 10 MG/1
TABLET ORAL
Qty: 90 TAB | Refills: 1 | Status: SHIPPED | OUTPATIENT
Start: 2020-09-22 | End: 2021-05-24

## 2020-09-22 RX ORDER — LOSARTAN POTASSIUM 50 MG/1
TABLET ORAL
COMMUNITY
Start: 2020-09-15 | End: 2020-09-22 | Stop reason: SDUPTHER

## 2020-09-22 RX ORDER — FUROSEMIDE 20 MG/1
TABLET ORAL
Qty: 90 TAB | Refills: 1 | Status: SHIPPED | OUTPATIENT
Start: 2020-09-22 | End: 2021-08-25

## 2020-09-22 NOTE — PROGRESS NOTES
ADVISED PATIENT OF THE FOLLOWING HEALTH MAINTAINCE DUE  Health Maintenance Due   Topic Date Due    Colonoscopy  06/22/1975    Shingrix Vaccine Age 50> (1 of 2) 06/22/2007    Breast Cancer Screen Mammogram  01/18/2019    Flu Vaccine (1) 09/01/2020      Chief Complaint   Patient presents with    Hypertension    COPD     asthma , difficulty with having to wear mask for work     Cholesterol Problem    Arthritis       1. Have you been to the ER, urgent care clinic since your last visit? Hospitalized since your last visit? No    2. Have you seen or consulted any other health care providers outside of the [address] [place] [address] since your last visit? Include any DEXA scan, mammography  or colon screening. No    3. Do you have an Advance Directive on file? no    4. Do you have a DNR on file? no    Patient is accompanied by self I have received verbal consent from Thony Rolon to discuss any/all medical information while they are present in the room. Advance Care Planning 2/1/2018   Patient's Healthcare Decision Maker is: Legal Next of Kin   Primary Decision Maker Name Armando vasques   Primary Decision Maker Phone Number [phone]   Primary Decision Maker Relationship to Patient Adult child   Confirm Advance Directive None   Patient Would Like to Complete Advance Directive -   Does the patient have other document types -         [id number], [address] [place] [id number]  Phone: [phone] Fax: [phone]    [address] [place] [address] Rd, [facility] [address] [address]  Phone: [phone] Fax: [phone]        Patient reminded during visit to bring all medication bottles, OTC medications to all appointments.

## 2020-09-22 NOTE — PROGRESS NOTES
Bushra Ruiz is a 63 y.o. female who was seen by synchronous (real-time) audio-video technology on 9/22/2020 for Hypertension; COPD (asthma , difficulty with having to wear mask for work ); Cholesterol Problem; and Arthritis        Assessment & Plan:   Diagnoses and all orders for this visit:    1. Essential hypertension, benign  -     METABOLIC PANEL, COMPREHENSIVE    2. Pure hypercholesterolemia  -     lovastatin (MEVACOR) 40 mg tablet; TAKE 1 TABLET BY MOUTH EVERY DAY AT NIGHT  -     LIPID PANEL  -     METABOLIC PANEL, COMPREHENSIVE  -     CBC W/O DIFF    3. Localized edema  -     furosemide (LASIX) 20 mg tablet; TAKE 1 TABLET BY MOUTH EVERY DAY    4. Morbid obesity ([facility] [address]    5. Prediabetes  -     METABOLIC PANEL, COMPREHENSIVE  -     HEMOGLOBIN A1C WITH EAG    6. Chronic obstructive pulmonary disease, unspecified COPD type ([facility]    Other orders  -     montelukast (SINGULAIR) 10 mg tablet; TAKE 1 TABLET BY MOUTH EVERY DAY  -     triamterene-hydroCHLOROthiazide (MAXZIDE) 37.5-25 mg per tablet; TAKE 1 TABLET BY MOUTH EVERY DAY  -     losartan (COZAAR) 50 mg tablet; Take 1 Tab by mouth daily. I spent at least 25 minutes on this visit with this established patient. Subjective:       Pt. is seen virtually for f/u. Has a few chronic medical issues including HTN, HLD, COPD, morbid obesity, prediabetes. Has been working from home some. Taking precautions for Covid 19. Denies any related signs or symptoms including fever, cough, SOB or CP. Needs medication refills and repeat labs. PMH/PSH/Allergies/Social History/medication list and most recent studies reviewed with patient. Tobacco use: No  Alcohol use: No  Reports compliance with medications and diet. Trying to be active physically to control weight. Reports no other new c/o.     Plan:  Refill medications  Repeat labs  F/u with other MD's as scheduled  Advised patient to lose weight by watching diet (decreasing sugars/carbs/fat, increasing fruits/vegetables), exercising at least 30 minutes daily, getting 7-8 hours of sleep daily, drinking plenty of water, and decreasing stress  COVID-19 precautions discussed with pt  Follow-up 6 months or as needed  Prior to Admission medications    Medication Sig Start Date End Date Taking? Authorizing Provider   montelukast (SINGULAIR) 10 mg tablet TAKE 1 TABLET BY MOUTH EVERY DAY 9/22/20  Yes Alexandre Renteria DO   lovastatin (MEVACOR) 40 mg tablet TAKE 1 TABLET BY MOUTH EVERY DAY AT NIGHT 9/22/20  Yes Alexandre Renteria DO   triamterene-hydroCHLOROthiazide (MAXZIDE) 37.5-25 mg per tablet TAKE 1 TABLET BY MOUTH EVERY DAY 9/22/20  Yes Alexandre Retneria DO   losartan (COZAAR) 50 mg tablet Take 1 Tab by mouth daily. 9/22/20  Yes Yovany Oakes,    furosemide (LASIX) 20 mg tablet TAKE 1 TABLET BY MOUTH EVERY DAY 9/22/20  Yes Alexandre Renteria DO   potassium chloride (Klor-Con M20) 20 mEq tablet TAKE 1 TABLET BY MOUTH EVERY DAY 5/8/20  Yes Carmen Kurtz NP   ipratropium-albuteroL (COMBIVENT RESPIMAT)  mcg/actuation inhaler TAKE 1 PUFF BY INHALATION EVERY SIX (6) HOURS. 3/10/20  Yes Alexandre Renteria DO   albuterol (PROVENTIL VENTOLIN) 2.5 mg /3 mL (0.083 %) nebulizer solution 3 mL by Nebulization route every six (6) hours as needed for Wheezing. 4/30/18  Yes Juventino Del Toro MD   PROBIOTIC 3 billion cell cap TAKE 1 CAPSULE BY MOUTH DAILY FOR 7 DAYS 2/5/18  Yes Provider, Historical   clobetasol (TEMOVATE) 0.05 % topical cream Apply  to affected area two (2) times a day. FOR PSORASIS 1/29/18  Yes Mojgan Reyes NP   CHOLECALCIFEROL, VITAMIN D3, (VITAMIN D-3 PO) Take 1,000 mg by mouth daily. 4/20/11  Yes Provider, Historical   aspirin 81 mg chewable tablet Take 81 mg by mouth daily. Yes Provider, Historical   losartan (COZAAR) 50 mg tablet  9/15/20 9/22/20  Provider, Historical   olmesartan (BENICAR) 40 mg tablet Take 1 Tab by mouth daily.  4/15/20 9/22/20  Carmen Kurtz, NP   montelukast (SINGULAIR) 10 mg tablet TAKE 1 TABLET BY MOUTH EVERY DAY 3/10/20 9/22/20  Arturo Felix,    lovastatin (MEVACOR) 40 mg tablet TAKE 1 TABLET BY MOUTH EVERY DAY AT NIGHT 3/10/20 9/22/20  Arturo Felix DO   triamterene-hydroCHLOROthiazide (MAXZIDE) 37.5-25 mg per tablet TAKE 1 TABLET BY MOUTH EVERY DAY 3/10/20 9/22/20  Arturo eFlix,    furosemide (LASIX) 20 mg tablet TAKE 1 TABLET BY MOUTH EVERY DAY 3/10/20 9/22/20  Arturo Felix DO   metroNIDAZOLE (METROGEL) 1 % topical gel Apply  to affected area daily. Use a thin layer to affected areas after washing 12/5/18   Mojgan Reyes NP   bacitracin zinc (BACITRACIN) ointment Apply  to affected area two (2) times a day.  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    Cellulitis of right lower extremity 02/01/2018    Rosacea 02/07/2017    Incisional hernia, without obstruction or gangrene 06/09/2016    Chronic obstructive pulmonary disease ([facility] [address] [address] 11/19/2013    S/P hysterectomy 04/16/2013    Endometrial hyperplasia with atypia 11/06/2012    Postmenopausal bleeding 10/18/2012    Recurrent umbilical hernia 56/66/2865    Mixed hyperlipidemia 06/05/2012    Tobacco use 06/05/2012    Morbid obesity ([facility] [address] [address] 06/05/2012    Essential hypertension, benign 08/10/3657    Umbilical hernia 01/05/0164    Acute chest pain 12/12/2011    HTN (hypertension) 07/23/2010    Pure hypercholesterolemia 07/23/2010    Asthma 07/23/2010    ADORE (obstructive sleep apnea) 07/23/2010    IBS (irritable bowel syndrome) 07/23/2010     Current Outpatient Medications   Medication Sig Dispense Refill    montelukast (SINGULAIR) 10 mg tablet TAKE 1 TABLET BY MOUTH EVERY DAY 90 Tab 1    lovastatin (MEVACOR) 40 mg tablet TAKE 1 TABLET BY MOUTH EVERY DAY AT NIGHT 90 Tab 1    triamterene-hydroCHLOROthiazide (MAXZIDE) 37.5-25 mg per tablet TAKE 1 TABLET BY MOUTH EVERY DAY 90 Tab 1    losartan (COZAAR) 50 mg tablet Take 1 Tab by mouth daily. 90 Tab 1    furosemide (LASIX) 20 mg tablet TAKE 1 TABLET BY MOUTH EVERY DAY 90 Tab 1    potassium chloride (Klor-Con M20) 20 mEq tablet TAKE 1 TABLET BY MOUTH EVERY DAY 90 Tab 1    ipratropium-albuteroL (COMBIVENT RESPIMAT)  mcg/actuation inhaler TAKE 1 PUFF BY INHALATION EVERY SIX (6) HOURS. 1 Inhaler 5    albuterol (PROVENTIL VENTOLIN) 2.5 mg /3 mL (0.083 %) nebulizer solution 3 mL by Nebulization route every six (6) hours as needed for Wheezing. 30 Each 0    PROBIOTIC 3 billion cell cap TAKE 1 CAPSULE BY MOUTH DAILY FOR 7 DAYS  0    clobetasol (TEMOVATE) 0.05 % topical cream Apply  to affected area two (2) times a day. FOR PSORASIS 1 Tube 5    CHOLECALCIFEROL, VITAMIN D3, (VITAMIN D-3 PO) Take 1,000 mg by mouth daily.  aspirin 81 mg chewable tablet Take 81 mg by mouth daily.  metroNIDAZOLE (METROGEL) 1 % topical gel Apply  to affected area daily. Use a thin layer to affected areas after washing 45 g 5    bacitracin zinc (BACITRACIN) ointment Apply  to affected area two (2) times a day.  15 g 0     Allergies   Allergen Reactions    Codeine Shortness of Breath and Swelling    Pcn [Penicillins] Shortness of Breath and Swelling    E-Mycin E Hives and Nausea and Vomiting    Scopolamine Swelling    Adhesive Tape-Silicones Rash and Swelling     Past Medical History:   Diagnosis Date    Angina decubitus (HCC)     Arthritis     KNEES    Asthma     COPD     Gallstones     H/O seasonal allergies     History of blood transfusion EARLY 1980'S    [facility], NO REACTION    Hypercholesterolemia     Hypertension     IBS (irritable bowel syndrome)     Morbid obesity (HCC)     Nausea & vomiting     WITH ALL 3 SURGERIES    Obesity     Other ill-defined conditions(429.31)     High cholesterol    Psoriasis     Recurrent umbilical hernia 3/59/2393    Sleep apnea     DOESN'T USE CPAP; SLEEPS IN RECLINER FOR 2 YEARS    Unspecified adverse effect of anesthesia     SMALL AIRWAY, ENLARGED TONSILS    URI (upper respiratory infection) 2014     Past Surgical History:   Procedure Laterality Date    HX  SECTION      HX CHOLECYSTECTOMY  2011    by Dr Kallie Riley Right      - Benign    HX DILATION AND CURETTAGE  10/2012    HX HEART CATHETERIZATION      HX HEENT      WISDOM TEETH EXTRACTION    HX HEENT Bilateral     TAN. TEAR DUCT TUMOR REMOVAL    HX HEENT      ALL TEETH REMOVED    HX HERNIA REPAIR  8940    umbilical hernia repair by Dr Hernando Og  12    ventral hernia repair by Dr Shruthi Win  16    Laparoscopic repair of recurrent incisional hernia with mesh by Dr Nino Brandon  2012    Dr Ivory Santos Left    2 PROCEDURES    ORIF L FOOT, 2ND REPAIR    HX WISDOM TEETH EXTRACTION      KNEE ARTHROSCP HARV      LAP,CHOLECYSTECTOMY  11    HI KNEE SCOPE,MED OR LAT MENIS REPAIR Bilateral     TAN 2 SEPARATE SURGERIES SAME YEAR     Social History     Tobacco Use    Smoking status: Current Every Day Smoker     Packs/day: 0.25     Years: 38.00     Pack years: 9.50     Types: Cigarettes    Smokeless tobacco: Never Used    Tobacco comment: DOWN TO 5 CIGARETTES PER DAY.  SMOKED 2 PPD FOR 7 YEARS   Substance Use Topics    Alcohol use: No     Alcohol/week: 0.0 standard drinks       ROS    Objective:     Patient-Reported Vitals 2020   Patient-Reported Systolic  821   Patient-Reported Diastolic 80        [INSTRUCTIONS:  \"[x]\" Indicates a positive item  \"[]\" Indicates a negative item  -- DELETE ALL ITEMS NOT EXAMINED]    Constitutional: [x] Appears well-developed and well-nourished [x] No apparent distress      [] Abnormal -     Mental status: [x] Alert and awake  [x] Oriented to person/place/time [x] Able to follow commands    [] Abnormal -     Eyes:   EOM    [x]  Normal    [] Abnormal -   Sclera  [x]  Normal    [] Abnormal -          Discharge [x]  None visible   [] Abnormal -     HENT: [x] Normocephalic, atraumatic  [] Abnormal -   [x] Mouth/Throat: Mucous membranes are moist    External Ears [x] Normal  [] Abnormal -    Neck: [x] No visualized mass [] Abnormal -     Pulmonary/Chest: [x] Respiratory effort normal   [x] No visualized signs of difficulty breathing or respiratory distress        [] Abnormal -      Musculoskeletal:   [x] Normal gait with no signs of ataxia         [x] Normal range of motion of neck        [] Abnormal -     Neurological:        [x] No Facial Asymmetry (Cranial nerve 7 motor function) (limited exam due to video visit)          [x] No gaze palsy        [] Abnormal -          Skin:        [x] No significant exanthematous lesions or discoloration noted on facial skin         [] Abnormal -            Psychiatric:       [x] Normal Affect [] Abnormal -        [x] No Hallucinations    Other pertinent observable physical exam findings:-        We discussed the expected course, resolution and complications of the diagnosis(es) in detail. Medication risks, benefits, costs, interactions, and alternatives were discussed as indicated. I advised her to contact the office if her condition worsens, changes or fails to improve as anticipated. She expressed understanding with the diagnosis(es) and plan. Hiral Flood, who was evaluated through a patient-initiated, synchronous (real-time) audio-video encounter, and/or her healthcare decision maker, is aware that it is a billable service, with coverage as determined by her insurance carrier. She provided verbal consent to proceed: Yes, and patient identification was verified.  It was conducted pursuant to the emergency declaration under the [address] Emergencies Act, [facility] authority and the Coronavirus Preparedness and Response Supplemental Appropriations Act. A caregiver was present when appropriate. Ability to conduct physical exam was limited. I was at home. The patient was at home.       David Dash DO

## 2020-11-12 LAB
ALBUMIN SERPL-MCNC: 4.1 G/DL (ref 3.8–4.8)
ALBUMIN/GLOB SERPL: 1.5 {RATIO} (ref 1.2–2.2)
ALP SERPL-CCNC: 105 IU/L (ref 39–117)
ALT SERPL-CCNC: 19 IU/L (ref 0–32)
AST SERPL-CCNC: 19 IU/L (ref 0–40)
BILIRUB SERPL-MCNC: 0.6 MG/DL (ref 0–1.2)
BUN SERPL-MCNC: 21 MG/DL (ref 8–27)
BUN/CREAT SERPL: 23 (ref 12–28)
CALCIUM SERPL-MCNC: 9.5 MG/DL (ref 8.7–10.3)
CHLORIDE SERPL-SCNC: 99 MMOL/L (ref 96–106)
CHOLEST SERPL-MCNC: 135 MG/DL (ref 100–199)
CO2 SERPL-SCNC: 26 MMOL/L (ref 20–29)
CREAT SERPL-MCNC: 0.9 MG/DL (ref 0.57–1)
ERYTHROCYTE [DISTWIDTH] IN BLOOD BY AUTOMATED COUNT: 13.7 % (ref 11.7–15.4)
EST. AVERAGE GLUCOSE BLD GHB EST-MCNC: 131 MG/DL
GLOBULIN SER CALC-MCNC: 2.7 G/DL (ref 1.5–4.5)
GLUCOSE SERPL-MCNC: 140 MG/DL (ref 65–99)
HBA1C MFR BLD: 6.2 % (ref 4.8–5.6)
HCT VFR BLD AUTO: 46 % (ref 34–46.6)
HDLC SERPL-MCNC: 50 MG/DL
HGB BLD-MCNC: 15 G/DL (ref 11.1–15.9)
INTERPRETATION, 910389: NORMAL
LDLC SERPL CALC-MCNC: 64 MG/DL (ref 0–99)
MCH RBC QN AUTO: 29.4 PG (ref 26.6–33)
MCHC RBC AUTO-ENTMCNC: 32.6 G/DL (ref 31.5–35.7)
MCV RBC AUTO: 90 FL (ref 79–97)
PLATELET # BLD AUTO: 214 X10E3/UL (ref 150–450)
POTASSIUM SERPL-SCNC: 3.4 MMOL/L (ref 3.5–5.2)
PROT SERPL-MCNC: 6.8 G/DL (ref 6–8.5)
RBC # BLD AUTO: 5.11 X10E6/UL (ref 3.77–5.28)
SODIUM SERPL-SCNC: 141 MMOL/L (ref 134–144)
TRIGL SERPL-MCNC: 114 MG/DL (ref 0–149)
VLDLC SERPL CALC-MCNC: 21 MG/DL (ref 5–40)
WBC # BLD AUTO: 7.6 X10E3/UL (ref 3.4–10.8)

## 2021-04-12 ENCOUNTER — IMMUNIZATION (OUTPATIENT)
Dept: INTERNAL MEDICINE CLINIC | Age: 66
End: 2021-04-12
Payer: COMMERCIAL

## 2021-04-12 DIAGNOSIS — Z23 ENCOUNTER FOR IMMUNIZATION: Primary | ICD-10-CM

## 2021-04-12 PROCEDURE — 0001A COVID-19, MRNA, LNP-S, PF, 30MCG/0.3ML DOSE(PFIZER): CPT | Performed by: FAMILY MEDICINE

## 2021-04-12 PROCEDURE — 91300 COVID-19, MRNA, LNP-S, PF, 30MCG/0.3ML DOSE(PFIZER): CPT | Performed by: FAMILY MEDICINE

## 2021-05-03 ENCOUNTER — IMMUNIZATION (OUTPATIENT)
Dept: INTERNAL MEDICINE CLINIC | Age: 66
End: 2021-05-03
Payer: COMMERCIAL

## 2021-05-03 DIAGNOSIS — Z23 ENCOUNTER FOR IMMUNIZATION: Primary | ICD-10-CM

## 2021-05-03 PROCEDURE — 91300 COVID-19, MRNA, LNP-S, PF, 30MCG/0.3ML DOSE(PFIZER): CPT | Performed by: FAMILY MEDICINE

## 2021-05-03 PROCEDURE — 0002A COVID-19, MRNA, LNP-S, PF, 30MCG/0.3ML DOSE(PFIZER): CPT | Performed by: FAMILY MEDICINE

## 2021-05-24 DIAGNOSIS — E78.00 PURE HYPERCHOLESTEROLEMIA: ICD-10-CM

## 2021-05-24 DIAGNOSIS — E87.6 HYPOKALEMIA: ICD-10-CM

## 2021-05-24 RX ORDER — LOSARTAN POTASSIUM 50 MG/1
TABLET ORAL
Qty: 90 TABLET | Refills: 1 | Status: SHIPPED | OUTPATIENT
Start: 2021-05-24 | End: 2021-11-18

## 2021-05-24 RX ORDER — LOVASTATIN 40 MG/1
TABLET ORAL
Qty: 90 TABLET | Refills: 1 | Status: SHIPPED | OUTPATIENT
Start: 2021-05-24 | End: 2021-11-18

## 2021-05-24 RX ORDER — TRIAMTERENE/HYDROCHLOROTHIAZID 37.5-25 MG
TABLET ORAL
Qty: 90 TABLET | Refills: 1 | Status: SHIPPED | OUTPATIENT
Start: 2021-05-24 | End: 2021-11-18

## 2021-05-24 RX ORDER — POTASSIUM CHLORIDE 20 MEQ/1
TABLET, EXTENDED RELEASE ORAL
Qty: 90 TABLET | Refills: 1 | Status: SHIPPED | OUTPATIENT
Start: 2021-05-24 | End: 2021-11-18

## 2021-05-24 RX ORDER — MONTELUKAST SODIUM 10 MG/1
TABLET ORAL
Qty: 90 TABLET | Refills: 1 | Status: SHIPPED | OUTPATIENT
Start: 2021-05-24 | End: 2021-11-18

## 2021-06-15 ENCOUNTER — OFFICE VISIT (OUTPATIENT)
Dept: INTERNAL MEDICINE CLINIC | Age: 66
End: 2021-06-15
Payer: COMMERCIAL

## 2021-06-15 VITALS
BODY MASS INDEX: 55.32 KG/M2 | SYSTOLIC BLOOD PRESSURE: 126 MMHG | TEMPERATURE: 97.6 F | OXYGEN SATURATION: 96 % | DIASTOLIC BLOOD PRESSURE: 66 MMHG | HEIGHT: 61 IN | HEART RATE: 97 BPM | RESPIRATION RATE: 18 BRPM | WEIGHT: 293 LBS

## 2021-06-15 DIAGNOSIS — I10 ESSENTIAL HYPERTENSION: ICD-10-CM

## 2021-06-15 DIAGNOSIS — L97.921 ULCER OF LEFT LOWER EXTREMITY, LIMITED TO BREAKDOWN OF SKIN (HCC): Primary | ICD-10-CM

## 2021-06-15 DIAGNOSIS — R73.03 PREDIABETES: ICD-10-CM

## 2021-06-15 DIAGNOSIS — E66.01 MORBID OBESITY (HCC): ICD-10-CM

## 2021-06-15 DIAGNOSIS — I87.2 VENOUS INSUFFICIENCY OF BOTH LOWER EXTREMITIES: ICD-10-CM

## 2021-06-15 DIAGNOSIS — J44.9 CHRONIC OBSTRUCTIVE PULMONARY DISEASE, UNSPECIFIED COPD TYPE (HCC): ICD-10-CM

## 2021-06-15 PROCEDURE — 99214 OFFICE O/P EST MOD 30 MIN: CPT | Performed by: INTERNAL MEDICINE

## 2021-06-15 RX ORDER — ALBUTEROL SULFATE 0.83 MG/ML
2.5 SOLUTION RESPIRATORY (INHALATION)
Qty: 30 EACH | Refills: 0 | Status: SHIPPED | OUTPATIENT
Start: 2021-06-15

## 2021-06-15 RX ORDER — DOXYCYCLINE 100 MG/1
100 TABLET ORAL 2 TIMES DAILY
Qty: 20 TABLET | Refills: 0 | Status: SHIPPED | OUTPATIENT
Start: 2021-06-15 | End: 2021-06-25

## 2021-06-15 RX ORDER — SILVER/HYDROCOLLOID DRESSING 1.2-3.5X12
BANDAGE TOPICAL
Qty: 10 EACH | Refills: 1 | Status: SHIPPED | OUTPATIENT
Start: 2021-06-15

## 2021-06-15 NOTE — PROGRESS NOTES
Health Maintenance Due   Topic Date Due    Shingrix Vaccine Age 49> (1 of 2) Never done    Colorectal Cancer Screening Combo  Never done    Breast Cancer Screen Mammogram  01/18/2019    PAP AKA CERVICAL CYTOLOGY  03/06/2021       Chief Complaint   Patient presents with    Wound Check     Left foot/ Leg     Hypertension    Cholesterol Problem    Medication Refill    Shortness of Breath       1. Have you been to the ER, urgent care clinic since your last visit? Hospitalized since your last visit? No    2. Have you seen or consulted any other health care providers outside of the [address] [place] [address] since your last visit? Include any pap smears or colon screening. No    3) Do you have an Advance Directive on file? no    4) Are you interested in receiving information on Advance Directives? NO      Patient is accompanied by self I have received verbal consent from Jenny Garcia to discuss any/all medical information while they are present in the room.

## 2021-06-29 NOTE — PROGRESS NOTES
HISTORY OF PRESENT ILLNESS  Jenny Garcia is a 61 y.o. female. Pt. comes in for f/u. Has a few chronic medical issues as documented. Vital signs are stable. Reports having an ulcer on the left foot/distal leg. Has had previous cellulitis of feet/legs. Has chronic venous insufficiency. Denies any obvious trauma. She has respiratory issues with COPD and asthma. Reports doing well with inhalers. She is morbidly obese. Continues to smoke some. Has chronic hypokalemia. On supplements. She has ADORE but not using CPAP on a regular basis. Has had Covid vaccination. Denies any related signs or symptoms. Needs medication refills. PMH/PSH/Allergies/Social History/medication list and most recent studies reviewed with patient. Tobacco use: Yes   alcohol use: Social  Reports compliance with medications and diet. Trying to be active physically to control weight. Reports no other new c/o. HPI    Review of Systems   Constitutional: Negative. HENT: Negative. Eyes: Negative. Respiratory: Positive for shortness of breath (DANIEL). Negative for sputum production and wheezing. Cardiovascular: Negative for chest pain and leg swelling. Genitourinary: Negative for dysuria. Musculoskeletal: Positive for joint pain. Negative for back pain and falls. Skin: Negative. Foot ulcer   Neurological: Negative for dizziness, sensory change, focal weakness and headaches. Endo/Heme/Allergies: Negative. Psychiatric/Behavioral: Negative for depression. The patient is not nervous/anxious and does not have insomnia. All other systems reviewed and are negative. Physical Exam  Vitals and nursing note reviewed. Constitutional:       General: She is not in acute distress. Appearance: She is well-developed. She is not diaphoretic. Comments: Pleasant   morbidly obese   HENT:      Head: Normocephalic and atraumatic. Nose: Nose normal.      Mouth/Throat:      Mouth: Mucous membranes are dry. Eyes:      General: No scleral icterus. Conjunctiva/sclera: Conjunctivae normal.   Neck:      Thyroid: No thyromegaly. Vascular: No carotid bruit or JVD. Cardiovascular:      Rate and Rhythm: Normal rate and regular rhythm. Heart sounds: Normal heart sounds. No murmur heard. Pulmonary:      Effort: Pulmonary effort is normal. No respiratory distress. Breath sounds: Normal breath sounds. No wheezing or rales. Abdominal:      General: There is no distension. Comments: obese   Musculoskeletal:         General: No tenderness or deformity. Normal range of motion. Cervical back: Normal range of motion and neck supple. Right lower leg: Edema present. Left lower leg: Edema present. Skin:     General: Skin is warm and dry. Findings: No rash. Comments: Left foot/distal leg ulcer, some erythema, no discharge  Chronic distal leg skin changes   Neurological:      Mental Status: She is alert and oriented to person, place, and time. Coordination: Coordination normal.   Psychiatric:         Behavior: Behavior normal.         ASSESSMENT and PLAN  Diagnoses and all orders for this visit:    1. Ulcer of left lower extremity, limited to breakdown of skin ([facility] [address]    2. Chronic obstructive pulmonary disease, unspecified COPD type ([address]    3. Morbid obesity ([facility] [address]    4. Essential hypertension    5. Prediabetes    6. Venous insufficiency of both lower extremities    Other orders  -     doxycycline (ADOXA) 100 mg tablet; Take 1 Tablet by mouth two (2) times a day for 10 days. -     Hydrocolloid Dressing (Aquacel Extra) 2 X 2 \" bndg; Apply to leg ulcer area every 3 days  -     albuterol (PROVENTIL VENTOLIN) 2.5 mg /3 mL (0.083 %) nebu; 3 mL by Nebulization route every six (6) hours as needed for Wheezing. Follow-up and Dispositions    · Return in about 6 months (around 12/15/2021).      lab results and schedule of future lab studies reviewed with patient  reviewed diet, exercise and weight control  reviewed medications and side effects in detail  Monitor BP at home with goal of 140/90 or less  Advised patient to lose weight by watching diet (decreasing sugars/carbs/fat, increasing fruits/vegetables), exercising at least 30 minutes daily, getting 7-8 hours of sleep daily, drinking plenty of water, and decreasing stress  F/u with other MD's as scheduled  COVID-19 precautions discussed with pt

## 2021-08-17 ENCOUNTER — OFFICE VISIT (OUTPATIENT)
Dept: INTERNAL MEDICINE CLINIC | Age: 66
End: 2021-08-17
Payer: COMMERCIAL

## 2021-08-17 VITALS
SYSTOLIC BLOOD PRESSURE: 142 MMHG | OXYGEN SATURATION: 99 % | RESPIRATION RATE: 18 BRPM | TEMPERATURE: 97.6 F | HEART RATE: 97 BPM | WEIGHT: 293 LBS | DIASTOLIC BLOOD PRESSURE: 80 MMHG | BODY MASS INDEX: 55.32 KG/M2 | HEIGHT: 61 IN

## 2021-08-17 DIAGNOSIS — I87.2 VENOUS STASIS DERMATITIS OF BOTH LOWER EXTREMITIES: ICD-10-CM

## 2021-08-17 DIAGNOSIS — R73.03 PREDIABETES: ICD-10-CM

## 2021-08-17 DIAGNOSIS — E66.01 MORBID OBESITY (HCC): ICD-10-CM

## 2021-08-17 DIAGNOSIS — L97.921 ULCER OF LEFT LOWER EXTREMITY, LIMITED TO BREAKDOWN OF SKIN (HCC): Primary | ICD-10-CM

## 2021-08-17 DIAGNOSIS — I10 ESSENTIAL HYPERTENSION: ICD-10-CM

## 2021-08-17 PROCEDURE — 99214 OFFICE O/P EST MOD 30 MIN: CPT | Performed by: INTERNAL MEDICINE

## 2021-08-17 NOTE — PROGRESS NOTES
Health Maintenance Due   Topic Date Due    Colorectal Cancer Screening Combo  Never done    Shingrix Vaccine Age 49> (1 of 2) Never done    Breast Cancer Screen Mammogram  01/18/2019    PAP AKA CERVICAL CYTOLOGY  03/06/2021       Chief Complaint   Patient presents with    Ulcer     Yellow Discharge     Hypertension    Cholesterol Problem       1. Have you been to the ER, urgent care clinic since your last visit? Hospitalized since your last visit? No    2. Have you seen or consulted any other health care providers outside of the [address] [place] [address] since your last visit? Include any pap smears or colon screening. No    3) Do you have an Advance Directive on file? no    4) Are you interested in receiving information on Advance Directives? NO      Patient is accompanied by self I have received verbal consent from Yoko Sher to discuss any/all medical information while they are present in the room.

## 2021-08-25 DIAGNOSIS — R60.0 LOCALIZED EDEMA: ICD-10-CM

## 2021-08-25 RX ORDER — FUROSEMIDE 20 MG/1
TABLET ORAL
Qty: 90 TABLET | Refills: 1 | Status: SHIPPED | OUTPATIENT
Start: 2021-08-25 | End: 2022-08-03 | Stop reason: SDUPTHER

## 2021-08-25 NOTE — PROGRESS NOTES
HISTORY OF PRESENT ILLNESS  Karma Murray is a 61 y.o. female. Pt. comes in for f/u. Has a few chronic medical issues as documented. Vital signs are stable. She has a chronic ulcer on the left foot/distal leg. History of previous cellulitis of feet/legs requiring hospitalization. Has chronic venous insufficiency. She is morbidly obese with BMI of 57. Denies any falls or trauma. Doing wound care at home with Aquacel. Wants to make sure there is no infection. Has noticed some drainage from time to time. She has respiratory issues with COPD and asthma. Reports doing well with inhalers. Continues to smoke some. Has chronic hypokalemia. On supplements. She has ADORE but not using CPAP on a regular basis. Has had Covid vaccination. Denies any related signs or symptoms. PMH/PSH/Allergies/Social History/medication list and most recent studies reviewed with patient. Tobacco use: Yes   alcohol use: Social  Reports compliance with medications and diet. She is not very active physically to control weight. Reports no other new c/o. HPI    Review of Systems   Constitutional: Negative. HENT: Negative. Eyes: Negative. Respiratory: Positive for shortness of breath (DANIEL). Negative for sputum production and wheezing. Cardiovascular: Negative for chest pain and leg swelling. Genitourinary: Negative for dysuria. Musculoskeletal: Positive for joint pain. Negative for back pain and falls. Skin: Negative. Leg ulcer   Neurological: Negative for dizziness, sensory change, focal weakness and headaches. Endo/Heme/Allergies: Negative. Psychiatric/Behavioral: Negative for depression. The patient is not nervous/anxious and does not have insomnia. All other systems reviewed and are negative. Physical Exam  Vitals and nursing note reviewed. Constitutional:       General: She is not in acute distress. Appearance: She is well-developed. She is not diaphoretic.       Comments: Pleasant morbidly obese   HENT:      Head: Normocephalic and atraumatic. Nose: Nose normal.      Mouth/Throat:      Mouth: Mucous membranes are dry. Eyes:      General: No scleral icterus. Conjunctiva/sclera: Conjunctivae normal.   Neck:      Thyroid: No thyromegaly. Vascular: No carotid bruit or JVD. Cardiovascular:      Rate and Rhythm: Normal rate and regular rhythm. Heart sounds: Normal heart sounds. No murmur heard. Pulmonary:      Effort: Pulmonary effort is normal. No respiratory distress. Breath sounds: Normal breath sounds. No wheezing or rales. Abdominal:      General: There is no distension. Comments: obese   Musculoskeletal:         General: No tenderness or deformity. Normal range of motion. Cervical back: Normal range of motion and neck supple. Right lower leg: Edema present. Left lower leg: Edema present. Comments: Distal left leg ulcer, no evidence of infection or bleeding, some granulation tissue. Picture taken. Skin:     General: Skin is warm and dry. Findings: No rash. Comments: Left foot/distal leg ulcer, some erythema, no discharge  Chronic distal leg skin changes   Neurological:      Mental Status: She is alert and oriented to person, place, and time. Coordination: Coordination normal.   Psychiatric:         Behavior: Behavior normal.         ASSESSMENT and PLAN  Diagnoses and all orders for this visit:    1. Ulcer of left lower extremity, limited to breakdown of skin ([address]  -     REFERRAL TO WOUND CARE  Continue current wound care  Reassurance that there is no evidence of acute infection    2. Venous stasis dermatitis of both lower extremities  -     REFERRAL TO WOUND CARE  Importance of controlling swelling discussed    3. Essential hypertension    4. Morbid obesity ([address]    5.  Prediabetes      Follow-up and Dispositions    · Return in about 3 months (around 11/17/2021), or if symptoms worsen or fail to improve.     lab results and schedule of future lab studies reviewed with patient  reviewed diet, exercise and weight control  reviewed medications and side effects in detail  F/u with other MD's as scheduled  Monitor BP at home with goal of 140/90 or less  Advised patient to lose weight by watching diet (decreasing sugars/carbs/fat, increasing fruits/vegetables), exercising at least 30 minutes daily, getting 7-8 hours of sleep daily, drinking plenty of water, and decreasing stress  COVID-19 precautions discussed with pt

## 2021-11-17 DIAGNOSIS — E87.6 HYPOKALEMIA: ICD-10-CM

## 2021-11-17 DIAGNOSIS — E78.00 PURE HYPERCHOLESTEROLEMIA: ICD-10-CM

## 2021-11-18 RX ORDER — MONTELUKAST SODIUM 10 MG/1
TABLET ORAL
Qty: 90 TABLET | Refills: 1 | Status: SHIPPED | OUTPATIENT
Start: 2021-11-18 | End: 2022-06-01

## 2021-11-18 RX ORDER — POTASSIUM CHLORIDE 20 MEQ/1
TABLET, EXTENDED RELEASE ORAL
Qty: 90 TABLET | Refills: 1 | Status: SHIPPED | OUTPATIENT
Start: 2021-11-18 | End: 2022-08-31 | Stop reason: SDUPTHER

## 2021-11-18 RX ORDER — LOSARTAN POTASSIUM 50 MG/1
TABLET ORAL
Qty: 90 TABLET | Refills: 1 | Status: SHIPPED | OUTPATIENT
Start: 2021-11-18 | End: 2022-06-01

## 2021-11-18 RX ORDER — TRIAMTERENE/HYDROCHLOROTHIAZID 37.5-25 MG
TABLET ORAL
Qty: 90 TABLET | Refills: 1 | Status: SHIPPED | OUTPATIENT
Start: 2021-11-18 | End: 2022-05-18

## 2021-11-18 RX ORDER — LOVASTATIN 40 MG/1
TABLET ORAL
Qty: 90 TABLET | Refills: 1 | Status: SHIPPED | OUTPATIENT
Start: 2021-11-18 | End: 2022-04-18

## 2022-01-26 ENCOUNTER — OFFICE VISIT (OUTPATIENT)
Dept: INTERNAL MEDICINE CLINIC | Age: 67
End: 2022-01-26
Payer: COMMERCIAL

## 2022-01-26 VITALS
SYSTOLIC BLOOD PRESSURE: 138 MMHG | DIASTOLIC BLOOD PRESSURE: 70 MMHG | OXYGEN SATURATION: 99 % | HEART RATE: 100 BPM | TEMPERATURE: 97.6 F | RESPIRATION RATE: 16 BRPM | HEIGHT: 61 IN | WEIGHT: 293 LBS | BODY MASS INDEX: 55.32 KG/M2

## 2022-01-26 DIAGNOSIS — L97.921 ULCER OF LEFT LOWER EXTREMITY, LIMITED TO BREAKDOWN OF SKIN (HCC): ICD-10-CM

## 2022-01-26 DIAGNOSIS — Z12.11 COLON CANCER SCREENING: ICD-10-CM

## 2022-01-26 DIAGNOSIS — E66.01 MORBID OBESITY (HCC): ICD-10-CM

## 2022-01-26 DIAGNOSIS — Z12.31 SCREENING MAMMOGRAM FOR BREAST CANCER: ICD-10-CM

## 2022-01-26 DIAGNOSIS — R73.03 PREDIABETES: ICD-10-CM

## 2022-01-26 DIAGNOSIS — I10 ESSENTIAL HYPERTENSION: ICD-10-CM

## 2022-01-26 DIAGNOSIS — Z72.0 TOBACCO USE: ICD-10-CM

## 2022-01-26 DIAGNOSIS — E78.00 PURE HYPERCHOLESTEROLEMIA: Primary | ICD-10-CM

## 2022-01-26 DIAGNOSIS — I87.2 VENOUS STASIS DERMATITIS OF BOTH LOWER EXTREMITIES: ICD-10-CM

## 2022-01-26 DIAGNOSIS — J44.9 CHRONIC OBSTRUCTIVE PULMONARY DISEASE, UNSPECIFIED COPD TYPE (HCC): ICD-10-CM

## 2022-01-26 PROCEDURE — 99214 OFFICE O/P EST MOD 30 MIN: CPT | Performed by: INTERNAL MEDICINE

## 2022-01-26 NOTE — PROGRESS NOTES
HISTORY OF PRESENT ILLNESS  Trey Currie is a 61 y.o. female. Pt. comes in for f/u. Has a few chronic medical issues as documented. Vital signs are stable. She is morbidly obese with BMI of 57. Her chronic ulcer on the left foot/distal leg is doing much better. Using Aquacel and doing wound care at home. History of previous cellulitis of feet/legs requiring hospitalization. Has chronic venous insufficiency. Denies any falls or trauma. Her COPD and asthma has been stable. Reports doing well with inhalers. Continues to smoke some. Has chronic hypokalemia. On supplements. She has ADORE but unable to tolerate CPAP. Has had Covid vaccination. Denies any related signs or symptoms. Due for repeat labs and colon cancer screening. Has never had a colonoscopy. PMH/PSH/Allergies/Social History/medication list and most recent studies reviewed with patient. Tobacco use: Yes   alcohol use: Social  Reports compliance with medications and diet. She is not very active physically to control weight. Reports no other new c/o. HPI    Review of Systems   Constitutional: Negative. HENT: Negative. Eyes: Negative. Respiratory: Positive for shortness of breath (DANIEL). Negative for sputum production and wheezing. Cardiovascular: Negative for chest pain and leg swelling. Genitourinary: Negative for dysuria. Musculoskeletal: Positive for joint pain. Negative for back pain and falls. Skin: Negative. Leg ulcer   Neurological: Negative for dizziness, sensory change, focal weakness and headaches. Endo/Heme/Allergies: Negative. Psychiatric/Behavioral: Negative for depression. The patient is not nervous/anxious and does not have insomnia. All other systems reviewed and are negative. Physical Exam  Vitals and nursing note reviewed. Constitutional:       General: She is not in acute distress. Appearance: She is well-developed. She is not diaphoretic.       Comments: Pleasant   morbidly obese   HENT:      Head: Normocephalic and atraumatic. Nose: Nose normal.      Mouth/Throat:      Mouth: Mucous membranes are dry. Eyes:      General: No scleral icterus. Conjunctiva/sclera: Conjunctivae normal.   Neck:      Thyroid: No thyromegaly. Vascular: No carotid bruit or JVD. Cardiovascular:      Rate and Rhythm: Normal rate and regular rhythm. Heart sounds: Normal heart sounds. No murmur heard. Pulmonary:      Effort: Pulmonary effort is normal. No respiratory distress. Breath sounds: Normal breath sounds. No wheezing or rales. Abdominal:      General: There is no distension. Comments: obese   Musculoskeletal:         General: No tenderness or deformity. Normal range of motion. Cervical back: Normal range of motion and neck supple. Right lower leg: Edema present. Left lower leg: Edema present. Comments: Distal left leg ulcer, no evidence of infection or bleeding, some granulation tissue. Picture taken. Skin:     General: Skin is warm and dry. Findings: No rash. Comments: Posterior distal leg ulcer looks much improved with minimal discharge, no infection or erythema  Chronic distal leg skin changes   Neurological:      Mental Status: She is alert and oriented to person, place, and time. Coordination: Coordination normal.   Psychiatric:         Behavior: Behavior normal.         ASSESSMENT and PLAN  Diagnoses and all orders for this visit:    1. Pure hypercholesterolemia  -     LIPID PANEL; Future  -     METABOLIC PANEL, COMPREHENSIVE; Future  -     CBC W/O DIFF; Future  -     HEMOGLOBIN A1C WITH EAG; Future  Stable chronic condition. Continue current treatment/medications. Watch fatty food and lose weight  2. Ulcer of left lower extremity, limited to breakdown of skin ([address]  Stable and improving  Continue wound care at home  3. Chronic obstructive pulmonary disease, unspecified COPD type ([address]  Stable chronic condition.   Continue current treatment/medications. Advised pt strongly to stop smoking     4. Venous stasis dermatitis of both lower extremities  Stable  Elevate feet as tolerated  Use compression stockings as tolerated  5. Essential hypertension  Stable chronic condition. Continue current treatment/medications. Monitor BP at home with goal of 140/90 or less    6. Morbid obesity ([address]  Advised patient to lose weight by watching diet (decreasing sugars/carbs/fat, increasing fruits/vegetables), exercising at least 30 minutes daily, getting 7-8 hours of sleep daily, drinking plenty of water, and decreasing stress    7. Prediabetes  -     HEMOGLOBIN A1C WITH EAG; Future  -     TSH 3RD GENERATION; Future    8. Tobacco   Advised pt strongly to stop smoking     9. Screening mammogram for breast cancer  -     JANA MAMMO BI SCREENING INCL CAD; Future    10. Colon cancer screening  -     COLOGUARD TEST (FECAL DNA COLORECTAL CANCER SCREENING)      Follow-up and Dispositions    · Return in about 6 months (around 7/26/2022). All chronic medical problems are stable  Continue with current medical management and plan  lab results and schedule of future lab studies reviewed with patient  reviewed diet, exercise and weight control  reviewed medications and side effects in detail  F/u with other MD's/ providers as scheduled  COVID-19 precautions discussed with pt  An After Visit Summary was printed and given to the patient.

## 2022-01-26 NOTE — PROGRESS NOTES
Health Maintenance Due   Topic Date Due    Colorectal Cancer Screening Combo  Never done    Shingrix Vaccine Age 49> (1 of 2) Never done    Breast Cancer Screen Mammogram  01/18/2019    Flu Vaccine (1) 09/01/2021    COVID-19 Vaccine (3 - Booster for Pfizer series) 10/03/2021    A1C test (Diabetic or Prediabetic)  11/11/2021    Lipid Screen  11/11/2021       Chief Complaint   Patient presents with    Hypertension    Asthma    COPD    Wound Check     Left leg       1. Have you been to the ER, urgent care clinic since your last visit? Hospitalized since your last visit? No    2. Have you seen or consulted any other health care providers outside of the [address] [place] [address] since your last visit? Include any pap smears or colon screening. No    3) Do you have an Advance Directive on file? no    4) Are you interested in receiving information on Advance Directives? NO      Patient is accompanied by self I have received verbal consent from Donal Sims to discuss any/all medical information while they are present in the room.

## 2022-01-27 LAB
ALBUMIN SERPL-MCNC: 4 G/DL (ref 3.8–4.8)
ALBUMIN/GLOB SERPL: 1.3 {RATIO} (ref 1.2–2.2)
ALP SERPL-CCNC: 112 IU/L (ref 44–121)
ALT SERPL-CCNC: 14 IU/L (ref 0–32)
AST SERPL-CCNC: 18 IU/L (ref 0–40)
BILIRUB SERPL-MCNC: 0.7 MG/DL (ref 0–1.2)
BUN SERPL-MCNC: 20 MG/DL (ref 8–27)
BUN/CREAT SERPL: 25 (ref 12–28)
CALCIUM SERPL-MCNC: 9.8 MG/DL (ref 8.7–10.3)
CHLORIDE SERPL-SCNC: 97 MMOL/L (ref 96–106)
CHOLEST SERPL-MCNC: 127 MG/DL (ref 100–199)
CO2 SERPL-SCNC: 25 MMOL/L (ref 20–29)
CREAT SERPL-MCNC: 0.8 MG/DL (ref 0.57–1)
ERYTHROCYTE [DISTWIDTH] IN BLOOD BY AUTOMATED COUNT: 13.3 % (ref 11.7–15.4)
EST. AVERAGE GLUCOSE BLD GHB EST-MCNC: 131 MG/DL
GLOBULIN SER CALC-MCNC: 3.1 G/DL (ref 1.5–4.5)
GLUCOSE SERPL-MCNC: 93 MG/DL (ref 65–99)
HBA1C MFR BLD: 6.2 % (ref 4.8–5.6)
HCT VFR BLD AUTO: 47.9 % (ref 34–46.6)
HDLC SERPL-MCNC: 48 MG/DL
HGB BLD-MCNC: 15.6 G/DL (ref 11.1–15.9)
IMP & REVIEW OF LAB RESULTS: NORMAL
LDLC SERPL CALC-MCNC: 58 MG/DL (ref 0–99)
MCH RBC QN AUTO: 29.3 PG (ref 26.6–33)
MCHC RBC AUTO-ENTMCNC: 32.6 G/DL (ref 31.5–35.7)
MCV RBC AUTO: 90 FL (ref 79–97)
PLATELET # BLD AUTO: 233 X10E3/UL (ref 150–450)
POTASSIUM SERPL-SCNC: 3.6 MMOL/L (ref 3.5–5.2)
PROT SERPL-MCNC: 7.1 G/DL (ref 6–8.5)
RBC # BLD AUTO: 5.33 X10E6/UL (ref 3.77–5.28)
SODIUM SERPL-SCNC: 138 MMOL/L (ref 134–144)
TRIGL SERPL-MCNC: 120 MG/DL (ref 0–149)
TSH SERPL-ACNC: 1.2 UIU/ML (ref 0.45–4.5)
VLDLC SERPL CALC-MCNC: 21 MG/DL (ref 5–40)
WBC # BLD AUTO: 8.1 X10E3/UL (ref 3.4–10.8)

## 2022-03-14 ENCOUNTER — TELEPHONE (OUTPATIENT)
Dept: INTERNAL MEDICINE CLINIC | Age: 67
End: 2022-03-14

## 2022-03-14 DIAGNOSIS — Z12.11 COLON CANCER SCREENING: ICD-10-CM

## 2022-03-14 DIAGNOSIS — K58.9 IRRITABLE BOWEL SYNDROME, UNSPECIFIED TYPE: Primary | ICD-10-CM

## 2022-03-14 NOTE — TELEPHONE ENCOUNTER
Call placed to pt, we received her results from [facility] and they were positive, She will need to have colonoscopy , referral placed

## 2022-03-18 PROBLEM — L71.9 ROSACEA: Status: ACTIVE | Noted: 2017-02-07

## 2022-03-18 PROBLEM — E87.6 HYPOKALEMIA: Status: ACTIVE | Noted: 2020-03-10

## 2022-03-18 PROBLEM — I87.2 VENOUS STASIS DERMATITIS OF BOTH LOWER EXTREMITIES: Status: ACTIVE | Noted: 2021-06-15

## 2022-03-18 PROBLEM — R60.0 LOCALIZED EDEMA: Status: ACTIVE | Noted: 2018-12-05

## 2022-03-18 PROBLEM — L40.9 GENERALIZED PSORIASIS: Status: ACTIVE | Noted: 2018-03-06

## 2022-03-19 PROBLEM — R73.03 PREDIABETES: Status: ACTIVE | Noted: 2020-03-10

## 2022-03-19 PROBLEM — L40.3: Status: ACTIVE | Noted: 2018-03-06

## 2022-03-20 PROBLEM — L03.115 CELLULITIS OF RIGHT LOWER EXTREMITY: Status: ACTIVE | Noted: 2018-02-01

## 2022-04-12 ENCOUNTER — ANESTHESIA EVENT (OUTPATIENT)
Dept: ENDOSCOPY | Age: 67
End: 2022-04-12
Payer: COMMERCIAL

## 2022-04-13 ENCOUNTER — ANESTHESIA (OUTPATIENT)
Dept: ENDOSCOPY | Age: 67
End: 2022-04-13
Payer: COMMERCIAL

## 2022-04-13 ENCOUNTER — HOSPITAL ENCOUNTER (OUTPATIENT)
Age: 67
Setting detail: OUTPATIENT SURGERY
Discharge: HOME | End: 2022-04-13
Attending: COLON & RECTAL SURGERY | Admitting: COLON & RECTAL SURGERY
Payer: COMMERCIAL

## 2022-04-13 VITALS
TEMPERATURE: 98.6 F | RESPIRATION RATE: 16 BRPM | SYSTOLIC BLOOD PRESSURE: 145 MMHG | OXYGEN SATURATION: 98 % | WEIGHT: 293 LBS | BODY MASS INDEX: 57.52 KG/M2 | HEART RATE: 93 BPM | HEIGHT: 60 IN | DIASTOLIC BLOOD PRESSURE: 66 MMHG

## 2022-04-13 PROCEDURE — 2709999900 HC NON-CHARGEABLE SUPPLY: Performed by: COLON & RECTAL SURGERY

## 2022-04-13 PROCEDURE — 74011000250 HC RX REV CODE- 250: Performed by: NURSE ANESTHETIST, CERTIFIED REGISTERED

## 2022-04-13 PROCEDURE — 77030009426 HC FCPS BIOP ENDOSC BSC -B: Performed by: COLON & RECTAL SURGERY

## 2022-04-13 PROCEDURE — C1713 ANCHOR/SCREW BN/BN,TIS/BN: HCPCS | Performed by: COLON & RECTAL SURGERY

## 2022-04-13 PROCEDURE — 77030013992 HC SNR POLYP ENDOSC BSC -B: Performed by: COLON & RECTAL SURGERY

## 2022-04-13 PROCEDURE — 76060000032 HC ANESTHESIA 0.5 TO 1 HR: Performed by: COLON & RECTAL SURGERY

## 2022-04-13 PROCEDURE — 88305 TISSUE EXAM BY PATHOLOGIST: CPT

## 2022-04-13 PROCEDURE — 77030010936 HC CLP LIG BSC -C: Performed by: COLON & RECTAL SURGERY

## 2022-04-13 PROCEDURE — 74011250636 HC RX REV CODE- 250/636: Performed by: NURSE ANESTHETIST, CERTIFIED REGISTERED

## 2022-04-13 PROCEDURE — 76040000007: Performed by: COLON & RECTAL SURGERY

## 2022-04-13 DEVICE — WORKING LENGTH 235CM, WORKING CHANNEL 2.8MM
Type: IMPLANTABLE DEVICE | Site: ASCENDING COLON | Status: FUNCTIONAL
Brand: RESOLUTION 360 CLIP

## 2022-04-13 RX ORDER — SODIUM CHLORIDE 9 MG/ML
INJECTION, SOLUTION INTRAVENOUS
Status: DISCONTINUED | OUTPATIENT
Start: 2022-04-13 | End: 2022-04-13 | Stop reason: HOSPADM

## 2022-04-13 RX ORDER — ATROPINE SULFATE 0.1 MG/ML
0.5 INJECTION INTRAVENOUS
Status: DISCONTINUED | OUTPATIENT
Start: 2022-04-13 | End: 2022-04-13 | Stop reason: HOSPADM

## 2022-04-13 RX ORDER — PROPOFOL 10 MG/ML
INJECTION, EMULSION INTRAVENOUS AS NEEDED
Status: DISCONTINUED | OUTPATIENT
Start: 2022-04-13 | End: 2022-04-13 | Stop reason: HOSPADM

## 2022-04-13 RX ORDER — SODIUM CHLORIDE 9 MG/ML
100 INJECTION, SOLUTION INTRAVENOUS CONTINUOUS
Status: DISCONTINUED | OUTPATIENT
Start: 2022-04-13 | End: 2022-04-13 | Stop reason: HOSPADM

## 2022-04-13 RX ORDER — FLUMAZENIL 0.1 MG/ML
0.2 INJECTION INTRAVENOUS
Status: DISCONTINUED | OUTPATIENT
Start: 2022-04-13 | End: 2022-04-13 | Stop reason: HOSPADM

## 2022-04-13 RX ORDER — MIDAZOLAM HYDROCHLORIDE 1 MG/ML
.25-5 INJECTION, SOLUTION INTRAMUSCULAR; INTRAVENOUS
Status: DISCONTINUED | OUTPATIENT
Start: 2022-04-13 | End: 2022-04-13 | Stop reason: HOSPADM

## 2022-04-13 RX ORDER — LIDOCAINE HYDROCHLORIDE 20 MG/ML
INJECTION, SOLUTION EPIDURAL; INFILTRATION; INTRACAUDAL; PERINEURAL AS NEEDED
Status: DISCONTINUED | OUTPATIENT
Start: 2022-04-13 | End: 2022-04-13 | Stop reason: HOSPADM

## 2022-04-13 RX ORDER — SODIUM CHLORIDE 0.9 % (FLUSH) 0.9 %
5-40 SYRINGE (ML) INJECTION AS NEEDED
Status: DISCONTINUED | OUTPATIENT
Start: 2022-04-13 | End: 2022-04-13 | Stop reason: HOSPADM

## 2022-04-13 RX ORDER — FENTANYL CITRATE 50 UG/ML
12.5-25 INJECTION, SOLUTION INTRAMUSCULAR; INTRAVENOUS
Status: DISCONTINUED | OUTPATIENT
Start: 2022-04-13 | End: 2022-04-13 | Stop reason: HOSPADM

## 2022-04-13 RX ORDER — EPINEPHRINE 0.1 MG/ML
1 INJECTION INTRACARDIAC; INTRAVENOUS
Status: DISCONTINUED | OUTPATIENT
Start: 2022-04-13 | End: 2022-04-13 | Stop reason: HOSPADM

## 2022-04-13 RX ORDER — SODIUM CHLORIDE 0.9 % (FLUSH) 0.9 %
5-40 SYRINGE (ML) INJECTION EVERY 8 HOURS
Status: DISCONTINUED | OUTPATIENT
Start: 2022-04-13 | End: 2022-04-13 | Stop reason: HOSPADM

## 2022-04-13 RX ORDER — NALOXONE HYDROCHLORIDE 0.4 MG/ML
0.4 INJECTION, SOLUTION INTRAMUSCULAR; INTRAVENOUS; SUBCUTANEOUS
Status: DISCONTINUED | OUTPATIENT
Start: 2022-04-13 | End: 2022-04-13 | Stop reason: HOSPADM

## 2022-04-13 RX ADMIN — SODIUM CHLORIDE: 900 INJECTION, SOLUTION INTRAVENOUS at 10:00

## 2022-04-13 RX ADMIN — PROPOFOL 50 MG: 10 INJECTION, EMULSION INTRAVENOUS at 10:27

## 2022-04-13 RX ADMIN — LIDOCAINE HYDROCHLORIDE 80 MG: 20 INJECTION, SOLUTION EPIDURAL; INFILTRATION; INTRACAUDAL; PERINEURAL at 10:12

## 2022-04-13 RX ADMIN — PROPOFOL 50 MG: 10 INJECTION, EMULSION INTRAVENOUS at 10:51

## 2022-04-13 RX ADMIN — PROPOFOL 50 MG: 10 INJECTION, EMULSION INTRAVENOUS at 10:21

## 2022-04-13 RX ADMIN — PROPOFOL 50 MG: 10 INJECTION, EMULSION INTRAVENOUS at 10:55

## 2022-04-13 RX ADMIN — PROPOFOL 50 MG: 10 INJECTION, EMULSION INTRAVENOUS at 10:47

## 2022-04-13 RX ADMIN — PROPOFOL 50 MG: 10 INJECTION, EMULSION INTRAVENOUS at 10:12

## 2022-04-13 RX ADMIN — PROPOFOL 50 MG: 10 INJECTION, EMULSION INTRAVENOUS at 10:38

## 2022-04-13 RX ADMIN — PROPOFOL 50 MG: 10 INJECTION, EMULSION INTRAVENOUS at 10:13

## 2022-04-13 RX ADMIN — PROPOFOL 50 MG: 10 INJECTION, EMULSION INTRAVENOUS at 10:15

## 2022-04-13 RX ADMIN — PROPOFOL 50 MG: 10 INJECTION, EMULSION INTRAVENOUS at 10:42

## 2022-04-13 RX ADMIN — PROPOFOL 50 MG: 10 INJECTION, EMULSION INTRAVENOUS at 10:32

## 2022-04-13 RX ADMIN — PROPOFOL 50 MG: 10 INJECTION, EMULSION INTRAVENOUS at 10:19

## 2022-04-13 RX ADMIN — PROPOFOL 50 MG: 10 INJECTION, EMULSION INTRAVENOUS at 10:23

## 2022-04-13 NOTE — PROCEDURES
[facility]  [id number]  1955    Colonoscopy Operative Report    Procedure Type:   Colonoscopy with snare and hot forceps polypectomies, submucosal injection of Orise, and placement of Resolution 360 clip. Pre-operative Diagnosis:  Need for colorectal cancer screening. Positive Cologuard test.    Post-operative Diagnosis:  Colonic, rectal, and anal canal polyps. Diverticulosis. Surgeon:  Francisco Del Rosario MD    Assistant:  Endoscopy RN-2: Larry Jasmine RN    Referring Provider: Sada Jean DO    Sedation and Analgesia:  MAC anesthesia Propofol (650 mg)    Specimens Removed:  Polyps (4)    EBL:  0 mL. Complications: None apparent. Implants:  None. Indication For Procedure: The patient is an asymptomatic 62-year-old female who recently had a positive Cologuard test. She usually moves her bowels 1 to 2 times per day without straining, and her stools are not usually hard. She sometimes has fecal urgency but she denies experiencing any fecal incontinence. She has not seen any blood in the stool. She has never had a colonoscopy. A colonoscopy is indicated for colorectal cancer screening, especially in light of the Cologuard test result. Findings: There was one diminutive polyp located in the mid, rectum, one in the proximal anal canal, and one in the sigmoid colon. There was a nearly flat 9 mm polyp in the ascending colon and a 7 mm polyp located ion the sigmoid colon. There was extensive sigmoid diverticulosis. Procedure Details:  After informed consent was obtained, the patient was taken to the endoscopy suite where standard monitoring devices were attached and intravenous access was established. Sedation was administered by the anesthetist as needed throughout the procedure. The patient was placed in the left lateral decubitus position, and inspection of the perineum revealed no significant external lesions. Digital rectal examination revealed no masses.     The Olympus videocolonoscope was lubricated and inserted transanally into the rectum. The rectal and anal canal polyps noted above were excised using the hot forceps. The scope was then advanced with some difficulty to the cecum, which was identified by the presence of the ileocecal valve and the appendiceal orifice. The quality of the bowel preparation was good, as was the overall visualization. Careful inspection was performed during withdrawal of the colonoscope. The ascending colon polyp was elevated using Orise, and then excised using the hot snare. The polypectomy site was then closed using a Resolution 360 clip. The sigmoid colon polyps were excised using the hot snare. Hemostasis was excellent. There were no apparent complications, and the patient appeared to have tolerated the procedure well. At its conclusion, she was transported to the recovery area in good condition. Impression:    The polyps all appeared to have been benign. Recommendations: The patient should consume a high fiber diet. I will contact her with the pathology results and make a follow-up recommendation at that time.

## 2022-04-13 NOTE — DISCHARGE INSTRUCTIONS
Trinidad Estevez, [address] [facility] [facility], [facility] [facility]  [phone]      Post-Polypectomy Instructions    1. Do not drive, operate dangerous machinery, sign legal documents, or conduct any important business for the rest of the day. 2. Avoid strenuous exercise for the next 10 days. 3. Avoid straining when you move your bowels. 4. Do not take any aspirin, aspirin-containing products, ibuprofen (Advil, Motrin, etc.), or Aleve for the next two weeks. You may take Tylenol as directed on the bottle if needed. 5. You may begin with a light diet today then advance to your usual diet as tolerated. Do not drink alcohol for the rest of the day. 6. If you notice blood in your stool for more than one or two bowel movements following the procedure, if you develop abdominal pain (aside from gas cramps on the day of the procedure), or if you develop a fever with a temperature of 101.0 or higher, call my office. 7. If you do not have a bowel movement within the next two days, call my office. 8. If you have any other problems or questions, please call my office. 9. Findings and Follow-up:  There was no cancer. You had four benign-appearing polyps that we removed, and you have extensive diverticulosis (not diverticulitis). You should consume a high fiber diet. I will discuss the pathology results with you when they are available, and I will make a follow-up recommendation at that time. Please call my office if you have not heard from me by Monday 4/18/2022.

## 2022-04-13 NOTE — PROGRESS NOTES

## 2022-04-13 NOTE — PROGRESS NOTES
Miles Quezada  1955  [id number]    Situation:  Verbal report received from: sun Caballero RN  Procedure: Procedure(s):  COLONOSCOPY  ENDOSCOPIC POLYPECTOMY  INJECTION  RESOLUTION CLIP    Background:    Preoperative diagnosis: Screen for colon cancer [Z12.11]  Positive colorectal cancer screening using Cologuard test [R19.5]  Postoperative diagnosis: 1. Diverticulosis  2. Colonic polyps  3. Rectal polyp  4. Anal polyp    :  Dr. Alexa Harada  Assistant(s): Endoscopy RN-1: Jerri Reyes RN  Endoscopy RN-2: Penny Mcmahon RN    Specimens:   ID Type Source Tests Collected by Time Destination   1 : Mid rectal polyp Preservative Rectum  Girish Lebron MD 4/13/2022 1021 Pathology   2 : Anal canal polyp Preservative Anal  Girish Lebron MD 4/13/2022 1021 Pathology   3 : Ascending colon polyp Preservative Colon, Ascending  Girish Lebron MD 4/13/2022 1049 Pathology   4 : Sigmoid colon polyps, 2 Preservative Sigmoid  Girish Lebron MD 4/13/2022 1056 Pathology     H. Pylori  no    Assessment:    Anesthesia gave intra-procedure sedation and medications, see anesthesia flow sheet yes    Intravenous fluids: NS@ KVO     Vital signs stable     Abdominal assessment: round and soft     Recommendation:  Discharge patient per MD order.   Family: in waiting room  Permission to share finding with family or friend yes

## 2022-04-13 NOTE — ANESTHESIA PREPROCEDURE EVALUATION
LMOM that Dr. Adams is not taking any new patients- so needs to reschedule with another provider   Relevant Problems   RESPIRATORY SYSTEM   (+) Asthma   (+) Chronic obstructive pulmonary disease (HCC)   (+) ADORE (obstructive sleep apnea)      CARDIOVASCULAR   (+) Essential hypertension, benign      ENDOCRINE   (+) Morbid obesity (HCC)       Anesthetic History   No history of anesthetic complications  PONV          Review of Systems / Medical History  Patient summary reviewed, nursing notes reviewed and pertinent labs reviewed    Pulmonary  Within defined limits  COPD    Sleep apnea    Asthma        Neuro/Psych   Within defined limits           Cardiovascular  Within defined limits  Hypertension                   GI/Hepatic/Renal  Within defined limits              Endo/Other  Within defined limits      Morbid obesity and arthritis     Other Findings              Physical Exam    Airway  Mallampati: III  TM Distance: > 6 cm  Neck ROM: normal range of motion   Mouth opening: Normal     Cardiovascular  Regular rate and rhythm,  S1 and S2 normal,  no murmur, click, rub, or gallop             Dental  No notable dental hx       Pulmonary  Breath sounds clear to auscultation               Abdominal  GI exam deferred       Other Findings            Anesthetic Plan    ASA: 3  Anesthesia type: MAC            Anesthetic plan and risks discussed with: Patient

## 2022-04-13 NOTE — ANESTHESIA POSTPROCEDURE EVALUATION
Procedure(s):  COLONOSCOPY  ENDOSCOPIC POLYPECTOMY  INJECTION  RESOLUTION CLIP. MAC    Anesthesia Post Evaluation      Multimodal analgesia: multimodal analgesia not used between 6 hours prior to anesthesia start to PACU discharge  Patient location during evaluation: PACU  Patient participation: complete - patient participated  Level of consciousness: awake  Pain score: 0  Pain management: adequate  Airway patency: patent  Anesthetic complications: no  Cardiovascular status: acceptable  Respiratory status: acceptable  Hydration status: acceptable  Comments: I have evaluated the patient and meets criteria for discharge from PACU. Brennon Casey MD    Final Post Anesthesia Temperature Assessment:  Normothermia (36.0-37.5 degrees C)      INITIAL Post-op Vital signs:   Vitals Value Taken Time   /66 04/13/22 1120   Temp 37 °C (98.6 °F) 04/13/22 1106   Pulse 88 04/13/22 1124   Resp 12 04/13/22 1124   SpO2 98 % 04/13/22 1115   Vitals shown include unvalidated device data.

## 2022-04-13 NOTE — H&P
History and Physical (outpatient)    Patient: Trey Snow 61 y.o. female     Chief Complaint:  Positive Cologuard test    History of Present Illness: The patient is an asymptomatic 79-year-old female who recently had a positive Cologuard test. She usually moves her bowels 1 to 2 times per day without straining, and her stools are not usually hard. She sometimes has fecal urgency but she denies experiencing any fecal incontinence. She has not seen any blood in the stool. She has never had a colonoscopy. History:  Past Medical History:   Diagnosis Date    Angina decubitus ([address]     Arthritis     KNEES    Asthma     COPD     COVID-19 vaccine series completed     Pfizer dose #1 received on 2021; dose #2 received on 5/3/2021    H/O seasonal allergies     History of blood transfusion EARLY     [facility], NO REACTION    Hypercholesterolemia     Hypertension     IBS (irritable bowel syndrome)     Morbid obesity with BMI of 50.0-59.9, adult ([facility])     BMI 57    Nausea & vomiting     WITH ALL 3 SURGERIES    Psoriasis     Recurrent umbilical hernia     Sleep apnea     DOESN'T USE CPAP; SLEEPS IN RECLINER FOR 2 YEARS    Unspecified adverse effect of anesthesia     SMALL AIRWAY, ENLARGED TONSILS    URI (upper respiratory infection) 2014       Past Surgical History:   Procedure Laterality Date    HX  SECTION      HX CHOLECYSTECTOMY  2011    by Dr Lorelei Batista Right      - Benign    HX DILATION AND CURETTAGE  10/2012    HX HEART CATHETERIZATION      HX HEENT      WISDOM TEETH EXTRACTION    HX HEENT Bilateral     TAN.  TEAR DUCT TUMOR REMOVAL    HX HEENT      ALL TEETH REMOVED    HX HERNIA REPAIR      umbilical hernia repair by Dr Girma Cosme  14    ventral hernia repair by Dr Stacey Rodriguez  18    Laparoscopic repair of recurrent incisional hernia with mesh by Dr Romulo Callaway  12/1/2012    Dr Bravo Reap HX ORTHOPAEDIC Left 2/09   2 PROCEDURES    ORIF L FOOT, 2ND REPAIR    HX WISDOM TEETH EXTRACTION      KNEE ARTHROSCP HARV      OH KNEE SCOPE,MED OR LAT MENIS REPAIR Bilateral 1980    TAN 2 SEPARATE SURGERIES SAME YEAR    OH LAP,CHOLECYSTECTOMY  4/27/11       Family History   Problem Relation Age of Onset    Hypertension Mother     Post-op Nausea/Vomiting Mother     Stroke Mother     Post-op Nausea/Vomiting Daughter     Heart Disease Father     No Known Problems Sister     No Known Problems Sister     No Known Problems Sister      Social History     Socioeconomic History    Marital status:      Spouse name: Not on file    Number of children: Not on file    Years of education: Not on file    Highest education level: Not on file   Occupational History    Not on file   Tobacco Use    Smoking status: Current Every Day Smoker     Packs/day: 0.25     Years: 38.00     Pack years: 9.50     Types: Cigarettes    Smokeless tobacco: Never Used    Tobacco comment: DOWN TO 5 CIGARETTES PER DAY. SMOKED 2 PPD FOR 7 YEARS   Substance and Sexual Activity    Alcohol use: No     Alcohol/week: 0.0 standard drinks    Drug use: No    Sexual activity: Not on file   Other Topics Concern    Not on file   Social History Narrative    Not on file     Social Determinants of Health     Financial Resource Strain:     Difficulty of Paying Living Expenses: Not on file   Food Insecurity:     Worried About Running Out of Food in the Last Year: Not on file    David of Food in the Last Year: Not on file   Transportation Needs:     Lack of Transportation (Medical): Not on file    Lack of Transportation (Non-Medical):  Not on file   Physical Activity:     Days of Exercise per Week: Not on file    Minutes of Exercise per Session: Not on file   Stress:     Feeling of Stress : Not on file   Social Connections:     Frequency of Communication with Friends and Family: Not on file    Frequency of Social Gatherings with Friends and Family: Not on file    Attends [employer] Services: Not on file    Active Member of Clubs or Organizations: Not on file    Attends Club or Organization Meetings: Not on file    Marital Status: Not on file   Intimate Partner Violence:     Fear of Current or Ex-Partner: Not on file    Emotionally Abused: Not on file    Physically Abused: Not on file    Sexually Abused: Not on file   Housing Stability:     Unable to Pay for Housing in the Last Year: Not on file    Number of [place] in the Last Year: Not on file    Unstable Housing in the Last Year: Not on file       Allergies: Allergies   Allergen Reactions    Codeine Shortness of Breath and Swelling    Pcn [Penicillins] Shortness of Breath and Swelling    E-Mycin E Hives and Nausea and Vomiting    Scopolamine Swelling    Adhesive Tape-Silicones Rash and Swelling       Current Medications:  Cannot display prior to admission medications because the patient has not been admitted in this contact. No current facility-administered medications for this encounter. Current Outpatient Medications   Medication Sig    lovastatin (MEVACOR) 40 mg tablet TAKE ONE TABLET BY MOUTH NIGHTLY    triamterene-hydroCHLOROthiazide (MAXZIDE) 37.5-25 mg per tablet TAKE 1 TABLET BY MOUTH ONE TIME A DAY    potassium chloride (K-DUR, KLOR-CON M20) 20 mEq tablet TAKE 1 TABLET BY MOUTH ONE TIME A DAY    montelukast (SINGULAIR) 10 mg tablet TAKE 1 TABLET BY MOUTH ONE TIME A DAY    losartan (COZAAR) 50 mg tablet TAKE 1 TABLET BY MOUTH ONE TIME A DAY    furosemide (LASIX) 20 mg tablet TAKE 1 TABLET BY MOUTH ONE TIME A DAY    Hydrocolloid Dressing (Aquacel Extra) 2 X 2 \" bndg Apply to leg ulcer area every 3 days    albuterol (PROVENTIL VENTOLIN) 2.5 mg /3 mL (0.083 %) nebu 3 mL by Nebulization route every six (6) hours as needed for Wheezing.     ipratropium-albuteroL (COMBIVENT RESPIMAT)  mcg/actuation inhaler TAKE 1 PUFF BY INHALATION EVERY SIX (6) HOURS.  metroNIDAZOLE (METROGEL) 1 % topical gel Apply  to affected area daily. Use a thin layer to affected areas after washing    PROBIOTIC 3 billion cell cap TAKE 1 CAPSULE BY MOUTH DAILY FOR 7 DAYS    clobetasol (TEMOVATE) 0.05 % topical cream Apply  to affected area two (2) times a day. FOR PSORASIS    CHOLECALCIFEROL, VITAMIN D3, (VITAMIN D-3 PO) Take 1,000 mg by mouth daily.  aspirin 81 mg chewable tablet Take 81 mg by mouth daily. Physical Exam:  There were no vitals taken for this visit. GENERAL:  No apparent distress. LUNGS:  Clear to auscultation bilaterally. HEART:  Regular rate and rhythm with no murmurs, gallops, or rubs. ABDOMEN: Obese. NEUROLOGIC: Alert and oriented. No gross deficits. Alerts:    Hospital Problems  Date Reviewed: 1/26/2022    None          Laboratory:    No results for input(s): HGB, HCT, WBC, PLT, INR, BUN, CREA, K, CRCLT, HGBEXT, HCTEXT, PLTEXT, INREXT in the last 72 hours. No lab exists for component: PTT, PT    Assessment:  A colonoscopy is indicated for colorectal cancer screening, especially in light of the positive Cologuard test.    Plan:  The risks, benefits, and alternatives were reviewed with the patient, and she has agreed to proceed with the procedure. The plan for this patient includes MAC.

## 2022-04-17 DIAGNOSIS — E78.00 PURE HYPERCHOLESTEROLEMIA: ICD-10-CM

## 2022-04-18 RX ORDER — LOVASTATIN 40 MG/1
TABLET ORAL
Qty: 90 TABLET | Refills: 1 | Status: SHIPPED | OUTPATIENT
Start: 2022-04-18 | End: 2022-08-31 | Stop reason: SDUPTHER

## 2022-05-18 RX ORDER — TRIAMTERENE/HYDROCHLOROTHIAZID 37.5-25 MG
TABLET ORAL
Qty: 90 TABLET | Refills: 1 | Status: SHIPPED | OUTPATIENT
Start: 2022-05-18 | End: 2022-08-31 | Stop reason: SDUPTHER

## 2022-06-01 RX ORDER — MONTELUKAST SODIUM 10 MG/1
TABLET ORAL
Qty: 90 TABLET | Refills: 1 | Status: SHIPPED | OUTPATIENT
Start: 2022-06-01 | End: 2022-10-03

## 2022-06-01 RX ORDER — LOSARTAN POTASSIUM 50 MG/1
TABLET ORAL
Qty: 90 TABLET | Refills: 1 | Status: SHIPPED | OUTPATIENT
Start: 2022-06-01 | End: 2022-10-03

## 2022-08-03 DIAGNOSIS — R60.0 LOCALIZED EDEMA: ICD-10-CM

## 2022-08-03 RX ORDER — FUROSEMIDE 20 MG/1
TABLET ORAL
Qty: 90 TABLET | Refills: 1 | Status: SHIPPED | OUTPATIENT
Start: 2022-08-03

## 2022-08-31 ENCOUNTER — OFFICE VISIT (OUTPATIENT)
Dept: INTERNAL MEDICINE CLINIC | Age: 67
End: 2022-08-31
Payer: COMMERCIAL

## 2022-08-31 DIAGNOSIS — I83.029 VENOUS ULCER OF LEFT LEG (HCC): ICD-10-CM

## 2022-08-31 DIAGNOSIS — E66.01 MORBID OBESITY (HCC): ICD-10-CM

## 2022-08-31 DIAGNOSIS — J44.1 CHRONIC OBSTRUCTIVE PULMONARY DISEASE WITH ACUTE EXACERBATION (HCC): ICD-10-CM

## 2022-08-31 DIAGNOSIS — I89.0 LYMPHEDEMA DUE TO VENOUS INSUFFICIENCY: ICD-10-CM

## 2022-08-31 DIAGNOSIS — I87.2 VENOUS STASIS DERMATITIS OF BOTH LOWER EXTREMITIES: Primary | ICD-10-CM

## 2022-08-31 DIAGNOSIS — I10 ESSENTIAL HYPERTENSION: ICD-10-CM

## 2022-08-31 DIAGNOSIS — E78.00 PURE HYPERCHOLESTEROLEMIA: ICD-10-CM

## 2022-08-31 DIAGNOSIS — I10 ESSENTIAL HYPERTENSION, BENIGN: ICD-10-CM

## 2022-08-31 DIAGNOSIS — J44.9 CHRONIC OBSTRUCTIVE PULMONARY DISEASE, UNSPECIFIED COPD TYPE (HCC): ICD-10-CM

## 2022-08-31 DIAGNOSIS — E87.6 HYPOKALEMIA: ICD-10-CM

## 2022-08-31 DIAGNOSIS — L97.929 VENOUS ULCER OF LEFT LEG (HCC): ICD-10-CM

## 2022-08-31 DIAGNOSIS — R73.03 PREDIABETES: ICD-10-CM

## 2022-08-31 DIAGNOSIS — I87.2 LYMPHEDEMA DUE TO VENOUS INSUFFICIENCY: ICD-10-CM

## 2022-08-31 PROCEDURE — 99214 OFFICE O/P EST MOD 30 MIN: CPT | Performed by: INTERNAL MEDICINE

## 2022-08-31 RX ORDER — POTASSIUM CHLORIDE 20 MEQ/1
TABLET, EXTENDED RELEASE ORAL
Qty: 90 TABLET | Refills: 1 | Status: SHIPPED | OUTPATIENT
Start: 2022-08-31

## 2022-08-31 RX ORDER — LOVASTATIN 40 MG/1
40 TABLET ORAL
Qty: 90 TABLET | Refills: 1 | Status: SHIPPED | OUTPATIENT
Start: 2022-08-31

## 2022-08-31 RX ORDER — TRIAMTERENE/HYDROCHLOROTHIAZID 37.5-25 MG
TABLET ORAL
Qty: 90 TABLET | Refills: 1 | Status: SHIPPED | OUTPATIENT
Start: 2022-08-31

## 2022-08-31 NOTE — PROGRESS NOTES
Health Maintenance Due   Topic Date Due    Shingrix Vaccine Age 49> (1 of 2) Never done    Breast Cancer Screen Mammogram  01/18/2019    Pneumococcal 65+ years (2 - PCV) 03/10/2021    COVID-19 Vaccine (3 - Booster for Pfizer series) 10/03/2021    Bone Densitometry (Dexa) Screening  Never done       Chief Complaint   Patient presents with    Hypertension    Abdominal Pain    Other     6m f/u         1. Have you been to the ER, urgent care clinic since your last visit? Hospitalized since your last visit? No    2. Have you seen or consulted any other health care providers outside of the [address] [place] [address] since your last visit? Include any pap smears or colon screening. No    3) Do you have an Advance Directive on file? no    4) Are you interested in receiving information on Advance Directives? NO      Patient is accompanied by self I have received verbal consent from Mat Read to discuss any/all medical information while they are present in the room.

## 2022-09-29 VITALS
WEIGHT: 293 LBS | OXYGEN SATURATION: 95 % | HEART RATE: 84 BPM | BODY MASS INDEX: 57.52 KG/M2 | TEMPERATURE: 98.6 F | DIASTOLIC BLOOD PRESSURE: 72 MMHG | SYSTOLIC BLOOD PRESSURE: 139 MMHG | RESPIRATION RATE: 18 BRPM | HEIGHT: 60 IN

## 2022-09-29 NOTE — PROGRESS NOTES
Subjective  Manuela Vinson is a 74 y.o. female. Pt. comes in for f/u. Has a few chronic medical issues as documented. Continues to have lower extremity edema. History of left distal leg ulcer. It has healed. Has been on antibiotics off-and-on. Reports having some pain. No other acute issues related to that. Uses Lasix. Not using compression stockings on regular basis. She is morbidly obese. All other chronic medical issues are stable on current treatment regimen. Has had Covid-19 vaccination. Reports taking proper precautions. Denies any related signs or symptoms. PMH/PSH/Allergies/Social History/medication list and most recent studies reviewed with patient. Needs medication refills. Tobacco use: No  Alcohol use: Social    Reports compliance with medications and diet. Trying to be active physically as tolerated. Reports no other new c/o.   Past Medical History:   Diagnosis Date    Angina decubitus ([facility]     Arthritis     KNEES    Asthma     COPD     COVID-19 vaccine series completed     Pfizer dose #1 received on 4/12/2021; dose #2 received on 5/3/2021    H/O seasonal allergies     History of blood transfusion EARLY 1980'S    [facility], NO REACTION    Hypercholesterolemia     Hypertension     IBS (irritable bowel syndrome)     Morbid obesity with BMI of 50.0-59.9, adult ([facility])     BMI 57    Nausea & vomiting     WITH ALL 3 SURGERIES    Psoriasis     Recurrent umbilical hernia 2/81/1522    Sleep apnea     DOESN'T USE CPAP; SLEEPS IN RECLINER FOR 2 YEARS    Unspecified adverse effect of anesthesia     SMALL AIRWAY, ENLARGED TONSILS    URI (upper respiratory infection) 01/2014     Allergies   Allergen Reactions    Codeine Shortness of Breath and Swelling    Pcn [Penicillins] Shortness of Breath and Swelling    E-Mycin E Hives and Nausea and Vomiting    Scopolamine Swelling    Adhesive Tape-Silicones Rash and Swelling     Current Outpatient Medications on File Prior to Visit Medication Sig Dispense Refill    furosemide (LASIX) 20 mg tablet TAKE 1 TABLET BY MOUTH ONE TIME A DAY 90 Tablet 1    losartan (COZAAR) 50 mg tablet TAKE 1 TABLET BY MOUTH ONE TIME A DAY 90 Tablet 1    montelukast (SINGULAIR) 10 mg tablet TAKE 1 TABLET BY MOUTH ONE TIME A DAY 90 Tablet 1    Hydrocolloid Dressing (Aquacel Extra) 2 X 2 \" bndg Apply to leg ulcer area every 3 days 10 Each 1    albuterol (PROVENTIL VENTOLIN) 2.5 mg /3 mL (0.083 %) nebu 3 mL by Nebulization route every six (6) hours as needed for Wheezing. 30 Each 0    metroNIDAZOLE (METROGEL) 1 % topical gel Apply  to affected area daily. Use a thin layer to affected areas after washing 45 g 5    PROBIOTIC 3 billion cell cap TAKE 1 CAPSULE BY MOUTH DAILY FOR 7 DAYS  0    clobetasol (TEMOVATE) 0.05 % topical cream Apply  to affected area two (2) times a day. FOR PSORASIS 1 Tube 5    CHOLECALCIFEROL, VITAMIN D3, (VITAMIN D-3 PO) Take 1,000 mg by mouth daily. No current facility-administered medications on file prior to visit. Visit Vitals  Blood Pressure 139/72 (BP 1 Location: Left upper arm, BP Patient Position: Sitting, BP Cuff Size: Adult long)   Pulse 84   Temperature 98.6 °F (37 °C) (Oral)   Respiration 18   Height 5' (1.524 m)   Weight 305 lb 3.2 oz (138.4 kg)   Oxygen Saturation 95%   Body Mass Index 59.61 kg/m²       HPI  Review of Systems   Constitutional: Negative. HENT: Negative. Eyes: Negative. Respiratory:  Positive for shortness of breath (DANIEL). Negative for sputum production and wheezing. Cardiovascular:  Positive for leg swelling. Negative for chest pain. Genitourinary:  Negative for dysuria. Musculoskeletal:  Positive for joint pain. Negative for back pain and falls. Skin: Negative. Neurological:  Negative for dizziness, sensory change, focal weakness and headaches. Endo/Heme/Allergies: Negative. Psychiatric/Behavioral:  Negative for depression.  The patient is not nervous/anxious and does not have insomnia. All other systems reviewed and are negative. Objective  Physical Exam  Vitals and nursing note reviewed. Constitutional:       General: She is not in acute distress. Appearance: She is well-developed. She is not diaphoretic. Comments: Pleasant   morbidly obese   HENT:      Head: Normocephalic and atraumatic. Nose: Nose normal.      Mouth/Throat:      Mouth: Mucous membranes are dry. Eyes:      General: No scleral icterus. Conjunctiva/sclera: Conjunctivae normal.   Neck:      Thyroid: No thyromegaly. Vascular: No carotid bruit or JVD. Cardiovascular:      Rate and Rhythm: Normal rate and regular rhythm. Heart sounds: Normal heart sounds. No murmur heard. Pulmonary:      Effort: Pulmonary effort is normal. No respiratory distress. Breath sounds: Normal breath sounds. No wheezing or rales. Abdominal:      General: There is no distension. Comments: obese   Musculoskeletal:         General: No swelling, tenderness or deformity. Normal range of motion. Cervical back: Normal range of motion and neck supple. Right lower leg: Edema present. Left lower leg: Edema present. Comments: Healed distal left leg ulcer, no evidence of infection or bleeding,   Skin:     General: Skin is warm and dry. Findings: No rash. Comments: Posterior distal leg ulcer looks much improved with minimal discharge, no infection or erythema  Chronic distal leg skin changes   Neurological:      Mental Status: She is alert and oriented to person, place, and time. Coordination: Coordination normal.   Psychiatric:         Behavior: Behavior normal.        Assessment & Plan    ICD-10-CM ICD-9-CM    1. Venous stasis dermatitis of both lower extremities  I87.2 454.1 REFERRAL TO LYMPHEDEMA CLINIC  Prescription for compression stockings given to patient      2. Lymphedema due to venous insufficiency  I89.0 457.1 REFERRAL TO LYMPHEDEMA CLINIC    I87.2 459.81       3. Chronic obstructive pulmonary disease, unspecified COPD type ([facility]  J44.9 496 Stable. Continue inhalers. 4. Essential hypertension  I10 401.9 Monitor BP at home with goal of 140/90 or less   Stable chronic condition. Continue current treatment/medications. 5. Morbid obesity ([facility]  E66.01 278.01 Advised patient to lose weight by watching diet (decreasing sugars/carbs/fat, increasing fruits/vegetables), exercising at least 30 minutes daily, getting 7-8 hours of sleep daily, drinking plenty of water, and decreasing stress        6. Prediabetes  R73.03 790.29       7. Essential hypertension, benign  I10 401.1       8. Venous ulcer of left leg (HCC)  I83.029 454.0 REFERRAL TO LYMPHEDEMA CLINIC  Reassurance that there is no evidence of infection    L97.929        9. Chronic obstructive pulmonary disease with acute exacerbation (HCC)  J44.1 491.21 ipratropium-albuteroL (Combivent Respimat)  mcg/actuation inhaler      10. Pure hypercholesterolemia  E78.00 272.0 lovastatin (MEVACOR) 40 mg tablet      11. Hypokalemia  E87.6 276.8 potassium chloride (K-DUR, KLOR-CON M20) 20 mEq tablet        Follow-up and Dispositions    Return in about 6 months (around 2/28/2023). All chronic medical problems are stable  Continue with current medical management and plan  lab results and schedule of future lab studies reviewed with patient  reviewed diet, exercise and weight control  reviewed medications and side effects in detail  F/u with other MD's/ providers as scheduled  COVID-19 precautions discussed with pt  An After Visit Summary was printed and given to the patient.     Vern Patino DO

## 2022-10-03 RX ORDER — LOSARTAN POTASSIUM 50 MG/1
TABLET ORAL
Qty: 90 TABLET | Refills: 1 | Status: SHIPPED | OUTPATIENT
Start: 2022-10-03

## 2022-10-03 RX ORDER — MONTELUKAST SODIUM 10 MG/1
TABLET ORAL
Qty: 90 TABLET | Refills: 1 | Status: SHIPPED | OUTPATIENT
Start: 2022-10-03

## 2022-11-23 DIAGNOSIS — R60.0 LOCALIZED EDEMA: ICD-10-CM

## 2022-11-25 RX ORDER — FUROSEMIDE 20 MG/1
TABLET ORAL
Qty: 90 TABLET | Refills: 1 | Status: SHIPPED | OUTPATIENT
Start: 2022-11-25

## 2023-03-29 DIAGNOSIS — E87.6 HYPOKALEMIA: ICD-10-CM

## 2023-03-29 RX ORDER — POTASSIUM CHLORIDE 20 MEQ/1
TABLET, EXTENDED RELEASE ORAL
Qty: 90 TABLET | Refills: 1 | Status: SHIPPED | OUTPATIENT
Start: 2023-03-29

## 2023-03-31 ENCOUNTER — OFFICE VISIT (OUTPATIENT)
Dept: INTERNAL MEDICINE CLINIC | Age: 68
End: 2023-03-31
Payer: COMMERCIAL

## 2023-03-31 VITALS
OXYGEN SATURATION: 95 % | TEMPERATURE: 98 F | RESPIRATION RATE: 18 BRPM | HEART RATE: 99 BPM | WEIGHT: 293 LBS | HEIGHT: 60 IN | BODY MASS INDEX: 57.52 KG/M2 | SYSTOLIC BLOOD PRESSURE: 132 MMHG | DIASTOLIC BLOOD PRESSURE: 80 MMHG

## 2023-03-31 DIAGNOSIS — I87.2 VENOUS STASIS DERMATITIS OF BOTH LOWER EXTREMITIES: Primary | ICD-10-CM

## 2023-03-31 DIAGNOSIS — I83.029 VENOUS ULCER OF LEFT LEG (HCC): ICD-10-CM

## 2023-03-31 DIAGNOSIS — R60.0 LOCALIZED EDEMA: ICD-10-CM

## 2023-03-31 DIAGNOSIS — L97.929 VENOUS ULCER OF LEFT LEG (HCC): ICD-10-CM

## 2023-03-31 DIAGNOSIS — J44.1 CHRONIC OBSTRUCTIVE PULMONARY DISEASE WITH ACUTE EXACERBATION (HCC): ICD-10-CM

## 2023-03-31 DIAGNOSIS — R73.03 PREDIABETES: ICD-10-CM

## 2023-03-31 DIAGNOSIS — I87.2 LYMPHEDEMA DUE TO VENOUS INSUFFICIENCY: ICD-10-CM

## 2023-03-31 DIAGNOSIS — I10 ESSENTIAL HYPERTENSION: ICD-10-CM

## 2023-03-31 DIAGNOSIS — Z12.31 ENCOUNTER FOR SCREENING MAMMOGRAM FOR BREAST CANCER: ICD-10-CM

## 2023-03-31 DIAGNOSIS — E78.00 PURE HYPERCHOLESTEROLEMIA: ICD-10-CM

## 2023-03-31 DIAGNOSIS — I89.0 LYMPHEDEMA DUE TO VENOUS INSUFFICIENCY: ICD-10-CM

## 2023-03-31 DIAGNOSIS — E66.01 MORBID OBESITY WITH BMI OF 60.0-69.9, ADULT (HCC): ICD-10-CM

## 2023-03-31 PROCEDURE — 99214 OFFICE O/P EST MOD 30 MIN: CPT | Performed by: INTERNAL MEDICINE

## 2023-03-31 RX ORDER — MONTELUKAST SODIUM 10 MG/1
TABLET ORAL
Qty: 90 TABLET | Refills: 1 | Status: SHIPPED | OUTPATIENT
Start: 2023-03-31

## 2023-03-31 RX ORDER — LOVASTATIN 40 MG/1
40 TABLET ORAL
Qty: 90 TABLET | Refills: 1 | Status: SHIPPED | OUTPATIENT
Start: 2023-03-31

## 2023-03-31 RX ORDER — TRIAMTERENE/HYDROCHLOROTHIAZID 37.5-25 MG
TABLET ORAL
Qty: 90 TABLET | Refills: 1 | Status: SHIPPED | OUTPATIENT
Start: 2023-03-31

## 2023-03-31 RX ORDER — LOSARTAN POTASSIUM 50 MG/1
TABLET ORAL
Qty: 90 TABLET | Refills: 1 | Status: SHIPPED | OUTPATIENT
Start: 2023-03-31

## 2023-03-31 RX ORDER — FUROSEMIDE 20 MG/1
TABLET ORAL
Qty: 90 TABLET | Refills: 1 | Status: SHIPPED | OUTPATIENT
Start: 2023-03-31

## 2023-03-31 NOTE — PROGRESS NOTES
Health Maintenance Due   Topic Date Due    Shingles Vaccine (1 of 2) Never done    Breast Cancer Screen Mammogram  01/18/2019    COVID-19 Vaccine (3 - Booster for Pfizer series) 06/28/2021    Bone Densitometry (Dexa) Screening  Never done    Flu Vaccine (1) 08/01/2022    A1C test (Diabetic or Prediabetic)  01/26/2023    Lipid Screen  01/26/2023       Chief Complaint   Patient presents with    Hypertension    Irritable Bowel Syndrome    Follow Up Chronic Condition       1. Have you been to the ER, urgent care clinic since your last visit? Hospitalized since your last visit? No    2. Have you seen or consulted any other health care providers outside of the [address] [place] [address] since your last visit? Include any pap smears or colon screening. No    3) Do you have an Advance Directive on file? no    4) Are you interested in receiving information on Advance Directives? NO      Patient is accompanied by self I have received verbal consent from Katie Garcia to discuss any/all medical information while they are present in the room.

## 2023-04-01 LAB
ALBUMIN SERPL-MCNC: 4.1 G/DL (ref 3.8–4.8)
ALBUMIN/GLOB SERPL: 1.5 {RATIO} (ref 1.2–2.2)
ALP SERPL-CCNC: 93 IU/L (ref 44–121)
ALT SERPL-CCNC: 16 IU/L (ref 0–32)
AST SERPL-CCNC: 16 IU/L (ref 0–40)
BILIRUB SERPL-MCNC: 0.6 MG/DL (ref 0–1.2)
BUN SERPL-MCNC: 24 MG/DL (ref 8–27)
BUN/CREAT SERPL: 28 (ref 12–28)
CALCIUM SERPL-MCNC: 9.9 MG/DL (ref 8.7–10.3)
CHLORIDE SERPL-SCNC: 98 MMOL/L (ref 96–106)
CHOLEST SERPL-MCNC: 124 MG/DL (ref 100–199)
CO2 SERPL-SCNC: 28 MMOL/L (ref 20–29)
CREAT SERPL-MCNC: 0.87 MG/DL (ref 0.57–1)
EGFRCR SERPLBLD CKD-EPI 2021: 74 ML/MIN/1.73
ERYTHROCYTE [DISTWIDTH] IN BLOOD BY AUTOMATED COUNT: 13.4 % (ref 11.7–15.4)
EST. AVERAGE GLUCOSE BLD GHB EST-MCNC: 134 MG/DL
GLOBULIN SER CALC-MCNC: 2.7 G/DL (ref 1.5–4.5)
GLUCOSE SERPL-MCNC: 96 MG/DL (ref 70–99)
HBA1C MFR BLD: 6.3 % (ref 4.8–5.6)
HCT VFR BLD AUTO: 47 % (ref 34–46.6)
HDLC SERPL-MCNC: 49 MG/DL
HGB BLD-MCNC: 15.2 G/DL (ref 11.1–15.9)
IMP & REVIEW OF LAB RESULTS: NORMAL
LDLC SERPL CALC-MCNC: 52 MG/DL (ref 0–99)
MCH RBC QN AUTO: 30.2 PG (ref 26.6–33)
MCHC RBC AUTO-ENTMCNC: 32.3 G/DL (ref 31.5–35.7)
MCV RBC AUTO: 93 FL (ref 79–97)
PLATELET # BLD AUTO: 224 X10E3/UL (ref 150–450)
POTASSIUM SERPL-SCNC: 3.7 MMOL/L (ref 3.5–5.2)
PROT SERPL-MCNC: 6.8 G/DL (ref 6–8.5)
RBC # BLD AUTO: 5.03 X10E6/UL (ref 3.77–5.28)
SODIUM SERPL-SCNC: 140 MMOL/L (ref 134–144)
TRIGL SERPL-MCNC: 129 MG/DL (ref 0–149)
VLDLC SERPL CALC-MCNC: 23 MG/DL (ref 5–40)
WBC # BLD AUTO: 8 X10E3/UL (ref 3.4–10.8)

## 2023-04-06 ENCOUNTER — APPOINTMENT (OUTPATIENT)
Dept: PHYSICAL THERAPY | Age: 68
End: 2023-04-06

## 2023-04-23 DIAGNOSIS — Z12.31 ENCOUNTER FOR SCREENING MAMMOGRAM FOR BREAST CANCER: Primary | ICD-10-CM

## 2023-04-26 NOTE — PROGRESS NOTES
Subjective  Julio Hammans is a 74 y.o. female. Chief Complaint   Patient presents with    Hypertension    Leg Pain     More Painful, would like a Rolator     Follow Up Chronic Condition      Pt. comes in for f/u. Has a few chronic medical issues as documented. She is morbidly obese and has gained more weight. Reports having pain in the legs. Gait is unsteady but no falls. Asking for a rollator. Respiratory status is stable on current medications. No recent ER or hospital visits. Has chronic lower extremity edema with stasis dermatitis. Continues to have issues with ulcers but denies any infections. All chronic medical issues are stable on current treatment regimen. Denies any issues with  Covid-19. PMH/PSH/Allergies/Social History/medication list and most recent studies reviewed with patient. Reports compliance with medications and diet. Has not been very active physically to control weight. Reports no other new c/o.     Tobacco Use: High Risk    Smoking Tobacco Use: Every Day    Smokeless Tobacco Use: Never    Passive Exposure: Not on file      Alcohol Use: Not on file        Past Medical History:   Diagnosis Date    Angina decubitus ([facility] [address]     Arthritis     KNEES    Asthma     COPD     COVID-19 vaccine series completed     Pfizer dose #1 received on 4/12/2021; dose #2 received on 5/3/2021    H/O seasonal allergies     History of blood transfusion EARLY 1980'S    [facility], NO REACTION    Hypercholesterolemia     Hypertension     IBS (irritable bowel syndrome)     Morbid obesity with BMI of 50.0-59.9, adult ([facility])     BMI 57    Nausea & vomiting     WITH ALL 3 SURGERIES    Psoriasis     Recurrent umbilical hernia 1/28/8334    Sleep apnea     DOESN'T USE CPAP; SLEEPS IN RECLINER FOR 2 YEARS    Unspecified adverse effect of anesthesia     SMALL AIRWAY, ENLARGED TONSILS    URI (upper respiratory infection) 01/2014       Allergies   Allergen Reactions    Codeine Shortness of Breath and Swelling    Pcn [Penicillins] Shortness of Breath and Swelling    E-Mycin E Hives and Nausea and Vomiting    Scopolamine Swelling    Adhesive Tape-Silicones Rash and Swelling       Current Outpatient Medications on File Prior to Visit   Medication Sig Dispense Refill    potassium chloride (K-DUR, KLOR-CON M20) 20 mEq tablet TAKE 1 TABLET BY MOUTH ONE TIME A DAY 90 Tablet 1    compr.stocking,knee,long,large (Comp Stocking,Knee,Long,Large) misc Wear in Am and take off at HS 1 Each 5    Hydrocolloid Dressing (Aquacel Extra) 2 X 2 \" bndg Apply to leg ulcer area every 3 days 10 Each 1    albuterol (PROVENTIL VENTOLIN) 2.5 mg /3 mL (0.083 %) nebu 3 mL by Nebulization route every six (6) hours as needed for Wheezing. 30 Each 0    PROBIOTIC 3 billion cell cap TAKE 1 CAPSULE BY MOUTH DAILY FOR 7 DAYS  0    CHOLECALCIFEROL, VITAMIN D3, (VITAMIN D-3 PO) Take 1,000 mg by mouth daily. No current facility-administered medications on file prior to visit. Visit Vitals  Blood Pressure 132/80 (BP 1 Location: Left upper arm, BP Patient Position: Sitting, BP Cuff Size: Adult)   Pulse 99   Temperature 98 °F (36.7 °C) (Oral)   Respiration 18   Height 5' (1.524 m)   Weight 315 lb 12.8 oz (143.2 kg)   Oxygen Saturation 95%   Body Mass Index 61.68 kg/m²         HPI  Review of Systems   Constitutional: Negative. HENT: Negative. Eyes: Negative. Respiratory:  Positive for shortness of breath (DANIEL). Negative for sputum production and wheezing. Cardiovascular:  Positive for leg swelling. Negative for chest pain. Genitourinary:  Negative for dysuria. Musculoskeletal:  Positive for joint pain. Negative for back pain and falls. Skin: Negative. Neurological:  Negative for dizziness, sensory change, focal weakness and headaches. Endo/Heme/Allergies: Negative. Psychiatric/Behavioral:  Negative for depression. The patient is not nervous/anxious and does not have insomnia.     All other systems reviewed and are negative. Objective  Physical Exam  Vitals and nursing note reviewed. Constitutional:       General: She is not in acute distress. Appearance: She is well-developed. She is not diaphoretic. Comments: Pleasant   morbidly obese   HENT:      Head: Normocephalic and atraumatic. Nose: Nose normal.      Mouth/Throat:      Mouth: Mucous membranes are moist.   Eyes:      General: No scleral icterus. Conjunctiva/sclera: Conjunctivae normal.   Neck:      Thyroid: No thyromegaly. Vascular: No carotid bruit or JVD. Cardiovascular:      Rate and Rhythm: Normal rate and regular rhythm. Heart sounds: Normal heart sounds. No murmur heard. Pulmonary:      Effort: Pulmonary effort is normal. No respiratory distress. Breath sounds: Normal breath sounds. No wheezing or rales. Abdominal:      General: There is no distension. Comments: obese   Musculoskeletal:         General: No swelling, tenderness or deformity. Normal range of motion. Cervical back: Normal range of motion and neck supple. Right lower leg: Edema present. Left lower leg: Edema present. Comments: Healed distal left leg ulcer, no evidence of infection or bleeding,   Skin:     General: Skin is warm and dry. Findings: No rash. Comments: Chronic distal leg skin changes   Neurological:      Mental Status: She is alert and oriented to person, place, and time. Coordination: Coordination normal.   Psychiatric:         Behavior: Behavior normal.        Assessment & Plan    ICD-10-CM ICD-9-CM    1. Venous stasis dermatitis of both lower extremities  I87.2 454.1 Elevate feet and use compression stockings  Follow-up with specialist as scheduled      2. Venous ulcer of left leg (HCC)  I83.029 454.0 See #1    L97.929        3.  Morbid obesity with BMI of 60.0-69.9, adult ([facility] [address]  E66.01 278.01 Advised patient to lose weight by watching diet (decreasing sugars/carbs/fat, increasing fruits/vegetables), exercising at least 30 minutes daily, getting 7-8 hours of sleep daily, drinking plenty of water, and decreasing stress      Z68.44 V85.44       4. Chronic obstructive pulmonary disease with acute exacerbation (HCC)  J44.1 491.21 ipratropium-albuteroL (Combivent Respimat)  mcg/actuation inhaler      5. Essential hypertension  I10 401.9       6. Lymphedema due to venous insufficiency  I89.0 457.1     I87.2 459.81       7. Pure hypercholesterolemia  E78.00 272.0 LIPID PANEL      METABOLIC PANEL, COMPREHENSIVE      CBC W/O DIFF      lovastatin (MEVACOR) 40 mg tablet      8. Prediabetes  R73.03 790.29 LIPID PANEL      METABOLIC PANEL, COMPREHENSIVE      CBC W/O DIFF      HEMOGLOBIN A1C WITH EAG      9. Localized edema  R60.0 782.3 furosemide (LASIX) 20 mg tablet      10. Encounter for screening mammogram for breast cancer  Z12.31 V76.12 JANA MAMMO BI SCREENING INCL CAD        Orders Placed This Encounter    JANA MAMMO BI SCREENING INCL CAD     Standing Status:   Future     Standing Expiration Date:   6/30/2023    LIPID PANEL     Standing Status:   Future     Standing Expiration Date:   5/62/1005    METABOLIC PANEL, COMPREHENSIVE     Standing Status:   Future     Standing Expiration Date:   3/31/2024    CBC W/O DIFF     Standing Status:   Future     Standing Expiration Date:   3/31/2024    HEMOGLOBIN A1C WITH EAG     Standing Status:   Future     Standing Expiration Date:   0/47/7361    METABOLIC PANEL, COMPREHENSIVE    CBC W/O DIFF    LIPID PANEL    HEMOGLOBIN A1C WITH EAG    CVD REPORT    montelukast (SINGULAIR) 10 mg tablet     Sig: TAKE 1 TABLET BY MOUTH ONE TIME A DAY     Dispense:  90 Tablet     Refill:  1    ipratropium-albuteroL (Combivent Respimat)  mcg/actuation inhaler     Sig: TAKE 1 PUFF BY INHALATION EVERY SIX (6) HOURS. Dispense:  1 Each     Refill:  5    lovastatin (MEVACOR) 40 mg tablet     Sig: Take 1 Tablet by mouth nightly.      Dispense:  90 Tablet     Refill:  1    losartan (COZAAR) 50 mg tablet     Sig: TAKE 1 TABLET BY MOUTH ONE TIME A DAY     Dispense:  90 Tablet     Refill:  1    triamterene-hydroCHLOROthiazide (MAXZIDE) 37.5-25 mg per tablet     Sig: TAKE 1 TABLET BY MOUTH ONE TIME A DAY     Dispense:  90 Tablet     Refill:  1    furosemide (LASIX) 20 mg tablet     Sig: TAKE 1 TABLET BY MOUTH ONE TIME A DAY     Dispense:  90 Tablet     Refill:  1     Follow-up and Dispositions    Return in about 6 months (around 9/30/2023). All chronic medical problems are stable  Continue with current medical management and plan  lab results and schedule of future lab studies reviewed with patient  reviewed diet, exercise and weight control  reviewed medications and side effects in detail  F/u with other MD's/ providers as scheduled  COVID-19 precautions discussed with pt  An After Visit Summary was printed and given to the patient.     Caren Hahn DO

## 2023-05-17 ENCOUNTER — HOSPITAL ENCOUNTER (OUTPATIENT)
Facility: HOSPITAL | Age: 68
Setting detail: RECURRING SERIES
Discharge: HOME | End: 2023-05-20
Payer: COMMERCIAL

## 2023-05-17 ENCOUNTER — APPOINTMENT (OUTPATIENT)
Dept: PHYSICAL THERAPY | Age: 68
End: 2023-05-17

## 2023-05-17 PROCEDURE — 97162 PT EVAL MOD COMPLEX 30 MIN: CPT

## 2023-05-17 PROCEDURE — 97110 THERAPEUTIC EXERCISES: CPT

## 2023-05-17 PROCEDURE — 97535 SELF CARE MNGMENT TRAINING: CPT

## 2023-05-17 NOTE — THERAPY EVALUATION
MEDIUM  Problem List: pain affecting function, edema affection function, decrease activity tolerance, and decrease flexibility/joint mobility   Treatment Plan may include any combination of the followin Therapeutic Exercise, 76988 Manual Therapy, 60897 Self Care/Home Management, 62614 Vasopneumatic Device, and 27344 Orthotic Management and Training  Patient / Family readiness to learn indicated by: asking questions, trying to perform skills, and interest  Persons(s) to be included in education: patient (P)  Barriers to Learning/Limitations: none  Rehabilitation Potential: fair to good    Short Term Goals: To be accomplished in 5 visits    Patient and/or caregiver will verbalize 3/3 signs and symptoms of infection without external cueing, in order to reduce the risk of infection and skin impairment and to promote optimal self management of condition. 2.   Patient to perform 5/5 lymphedema remedial exercises in session with modified independence utilizing HEP handout, in order to promote optimal independence with management of         condition, as well as promote optimal limb volume reduction required for proper fit of donned clothing in 6 weeks. 3.   Patient will demonstrate a loss of volume in the 250 ml in the legs bilaterally to reduce the risk of infection and progression of swelling over time for ease of performing lower body dressing and safe mobility. Long Term Goals: To be accomplished in 10 visits  1. Patient will demonstrate an improvement in self perceived functional impairment as evidenced by an improved score on the Lymphedema Life Impact Scale from 60% to 53%. 2.  Patient will demonstrate a loss of volume of 750 ml in the legs bilaterally to reduce the risk of infection and progression of swelling over time for ease of performing lower body dressing and safe mobility.   3.   Patient will be measured for and obtain comfortable and optimal fitting compression products to prevent

## 2023-05-17 NOTE — PROGRESS NOTES
the diagnosis indicated above. Lymphedema is a progressive and chronic condition. It may cause acute infections, muscle atrophy, discomfort, and connective tissue fibrosis with irreversible tissue damage, decreased ROM in the affected limb and diminished functional mobility possibly interfering with independence and ability to work. Recurrent infections and wound complications that commonly occur with Lymphedema often require hospitalization and extensive wound care, thus increasing medical costs. The patient has received complete decongestive therapy which includes manual lymphatic drainage, lymphedema specific exercises, compression bandaging, and hygiene/skin care. Goals of therapy are to reduce the edema and prevent re-accumulation of fluid with its complications. It is medically necessary for this patient to have daytime garments to control this condition. They will need 2 sets (one set to wear and one set to wash for adequate skin care and wearing time). Garments must be replaced every 4-6 months for effectiveness. There are no substitutes available that offer acceptable compression treatment for this Lymphedema patient. If further information is requested, please contact our certified lymphedema therapists at [phone].     [] Carson Perez PT, MSPT, CLT-MOHIT          [x]  Stevenson Downs, PT, CLT           [] Lisa Dunne, PTA, CLT, CSIS    Printed  Provider Name  Provider Signature  Date    Provider NPI

## 2023-05-26 ENCOUNTER — HOSPITAL ENCOUNTER (OUTPATIENT)
Facility: HOSPITAL | Age: 68
Setting detail: RECURRING SERIES
Discharge: HOME | End: 2023-05-29
Payer: COMMERCIAL

## 2023-05-26 PROCEDURE — 97140 MANUAL THERAPY 1/> REGIONS: CPT

## 2023-05-26 PROCEDURE — 97535 SELF CARE MNGMENT TRAINING: CPT

## 2023-05-26 PROCEDURE — 97110 THERAPEUTIC EXERCISES: CPT

## 2023-06-16 ENCOUNTER — HOSPITAL ENCOUNTER (OUTPATIENT)
Facility: HOSPITAL | Age: 68
Setting detail: RECURRING SERIES
Discharge: HOME | End: 2023-06-19
Payer: COMMERCIAL

## 2023-06-16 PROCEDURE — 97140 MANUAL THERAPY 1/> REGIONS: CPT

## 2023-06-16 PROCEDURE — 97760 ORTHOTIC MGMT&TRAING 1ST ENC: CPT

## 2023-06-29 ENCOUNTER — HOSPITAL ENCOUNTER (OUTPATIENT)
Facility: HOSPITAL | Age: 68
Setting detail: RECURRING SERIES
End: 2023-06-29
Payer: COMMERCIAL

## 2023-06-29 PROCEDURE — 97763 ORTHC/PROSTC MGMT SBSQ ENC: CPT

## 2023-06-29 PROCEDURE — 97535 SELF CARE MNGMENT TRAINING: CPT

## 2023-07-04 NOTE — PROGRESS NOTES
[facility]  a part of [address], [address] [facility], [address] [place]  Phone: [phone]  Fax: [phone]     PHYSICAL THERAPY PROGRESS NOTE            Patient Name:  Manish Saucedo :  1955   Treatment/Medical Diagnosis: Lymphedema, not elsewhere classified [I89.0]  Venous insufficiency (chronic) (peripheral) [I87.2]   Referral Source:  Burgess Vahid DO       Date of Initial Visit:  2023 Attended Visits:  4 Missed Visits:  0      SUMMARY OF TREATMENT/ASSESSMENT:  Patient able to return today for fitting of compression products for the L LE. Patient was not comfortable with the compressive liner socks in higher pressure, so will need exchange these for a reduced pressure liner sock. Patient to continue to work with the silver liners that came with the velcro product until she receives the additional liner socks. Patient does have a chronic wound on the lateral left ankle/calf that may not tolerate compressive liner socks and may need velcro foot piece if this is the case. Patient did not want to wear compression out of the clinic today because she was not sure how it would affect her gait and her ability to drive. Patient will begin daily use and build tolerance to garment in the home setting and will return in a month. Patient struggles with mobility and the long distance gait to clinic. Patient also has concerns about the cost of treatments and she prefers to reduce visit frequency at this time. Patient to make clinic aware if she has concerns when she receives the other compressive liner socks, other wise will return at the end of July for follow up. CURRENT STATUS  Short Term Goals: To be accomplished in 5 visits  Patient and/or caregiver will verbalize 3/3 signs and symptoms of infection without external cueing, in order to reduce the risk of infection and skin impairment and to promote optimal self management of condition.   Status at last

## 2023-07-26 ENCOUNTER — HOSPITAL ENCOUNTER (OUTPATIENT)
Facility: HOSPITAL | Age: 68
Setting detail: RECURRING SERIES
Discharge: HOME | End: 2023-07-29
Payer: COMMERCIAL

## 2023-07-26 PROCEDURE — 97140 MANUAL THERAPY 1/> REGIONS: CPT

## 2023-07-26 PROCEDURE — 97535 SELF CARE MNGMENT TRAINING: CPT

## 2023-07-27 NOTE — PROGRESS NOTES
PHYSICAL THERAPY/OCCUPATIONAL THERAPY - DAILY TREATMENT NOTE (updated 3/23)      Date: 2023        Patient Name:  Jaylen Lemus :  1955   Medical   Diagnosis:  Lymphedema, not elsewhere classified [I89.0]  Venous insufficiency (chronic) (peripheral) [I87.2] Treatment Diagnosis:  Lymphedema, not elsewhere classified (I89.0)   Referral Source:  Sangeetha Desai DO Insurance:   Payor: Marcia Paez / Plan: Shawn ESTEBAN [employer] / Product Type: *No Product type* /                   Patient  verified yes     Visit #   Current  / Total 5 10   Time   In / Out 10:35 am 12:00 pm   Total Treatment Time 85   Total Timed Codes 85     SUBJECTIVE    Pain Level (0-10 scale): B    Any medication changes, allergies to medications, adverse drug reactions, diagnosis change, or new procedure performed?: [x] No    [] Yes (see summary sheet for update)  Medications: Verified on Patient Summary List    Subjective functional status/changes:     Patient arrived today with some shortness of breath from walking the long [place]a[address] way to the clinic. \"I do not think I can get on that table today. I will have to just stay in the chair. \" Patient has asthma and the air quality was poor today and her gait endurance is limited. Patient reports that she is concerned about the costs of treatment after receiving bills from previous visits. Received her compression garments that were ordered and brought them in to be fitted today. OBJECTIVE    Therapeutic Procedures: Tx Min Billable or 1:1 Min (if diff from Tx Min) Procedure, Rationale, Specifics        15 15 [id number] Self Care/Home Management (timed):  improve patient knowledge and understanding of pain reducing techniques, positioning, home safety, and activity modification  to improve patient's ability to progress to PLOF and address remaining functional goals.   (see flow sheet as applicable)    Details if applicable:      Skin/wound care/debridement: Reviewed skin care

## 2023-08-24 ENCOUNTER — HOSPITAL ENCOUNTER (OUTPATIENT)
Facility: HOSPITAL | Age: 68
Setting detail: RECURRING SERIES
Discharge: HOME | End: 2023-08-27
Payer: COMMERCIAL

## 2023-08-24 PROCEDURE — 97535 SELF CARE MNGMENT TRAINING: CPT

## 2023-08-24 PROCEDURE — 97140 MANUAL THERAPY 1/> REGIONS: CPT

## 2023-08-24 PROCEDURE — 97110 THERAPEUTIC EXERCISES: CPT

## 2023-08-24 NOTE — PROGRESS NOTES
PHYSICAL THERAPY/OCCUPATIONAL THERAPY - DAILY TREATMENT NOTE (updated 3/23)    Date: 2023        Patient Name:  Esteban Thomson :  1955   Medical   Diagnosis:  Lymphedema, not elsewhere classified [I89.0]  Venous insufficiency (chronic) (peripheral) [I87.2] Treatment Diagnosis:  Lymphedema, not elsewhere classified (I89.0)   Referral Source:  Afsaneh Ivy DO Insurance:   Payor: [facility] / Plan: Marissa Sanchez [facility] / Product Type: *No Product type* /                   Patient  verified yes     Visit #   Current  / Total 6 10   Time   In / Out 10:25 am 11:30 am   Total Treatment Time 65   Total Timed Codes 65     SUBJECTIVE    Pain Level (0-10 scale): B    Any medication changes, allergies to medications, adverse drug reactions, diagnosis change, or new procedure performed?: [x] No    [] Yes (see summary sheet for update)  Medications: Verified on Patient Summary List    Subjective functional status/changes: Both garments for about 10 day s 9-2 and rest 2-4 breaking them up. Message MD about pump demo. OBJECTIVE    Therapeutic Procedures: Tx Min Billable or 1:1 Min (if diff from Tx Min) Procedure, Rationale, Specifics   10 10 16034 Therapeutic Exercise (timed):  increase ROM, strength, coordination, balance, and proprioception to improve patient's ability to progress to PLOF and address remaining functional goals. (see flow sheet as applicable)    Details if applicable:     Therapeutic Exercise:  [] Sukumar Hope Exercises [x] Remedial Lymphedema Exercise Program - active ROM routine [x]Daily exercise program with compression products in place - walking program    [] Stick Routine     40 40 23376 Self Care/Home Management (timed):  improve patient knowledge and understanding of pain reducing techniques, positioning, home safety, and activity modification  to improve patient's ability to progress to PLOF and address remaining functional goals.   (see flow sheet as

## 2023-08-30 ENCOUNTER — TRANSCRIBE ORDERS (OUTPATIENT)
Facility: HOSPITAL | Age: 68
End: 2023-08-30

## 2023-08-30 ENCOUNTER — TELEPHONE (OUTPATIENT)
Age: 68
End: 2023-08-30

## 2023-08-30 DIAGNOSIS — Z87.898 HISTORY OF LUMP IN BREAST: ICD-10-CM

## 2023-08-30 DIAGNOSIS — Z12.31 VISIT FOR SCREENING MAMMOGRAM: Primary | ICD-10-CM

## 2023-08-30 DIAGNOSIS — Z98.890 HISTORY OF BREAST LUMP/MASS EXCISION: Primary | ICD-10-CM

## 2023-08-30 NOTE — TELEPHONE ENCOUNTER
Does pt need diagnostic screening?- pt states she has lumps in right breast.    New order placed -BRIANA Russ

## 2023-09-22 RX ORDER — POTASSIUM CHLORIDE 20 MEQ/1
20 TABLET, EXTENDED RELEASE ORAL DAILY
Qty: 90 TABLET | Refills: 1 | Status: SHIPPED | OUTPATIENT
Start: 2023-09-22

## 2023-09-26 NOTE — PROGRESS NOTES
Bedside and Verbal shift change report given to Lamar ROBERTS (oncoming nurse) by Pako Manriquez (offgoing nurse). Report included the following information SBAR, Kardex, OR Summary, Procedure Summary, Intake/Output, MAR and Recent Results. What Type Of Note Output Would You Prefer (Optional)?: Standard Output Is This A New Presentation, Or A Follow-Up?: Rash Additional History: Patient states his brothers have had a history of psoriasis but not himself.

## 2023-10-03 RX ORDER — TRIAMTERENE AND HYDROCHLOROTHIAZIDE 37.5; 25 MG/1; MG/1
1 TABLET ORAL DAILY
Qty: 90 TABLET | Refills: 1 | Status: SHIPPED | OUTPATIENT
Start: 2023-10-03

## 2023-10-11 ENCOUNTER — HOSPITAL ENCOUNTER (OUTPATIENT)
Facility: HOSPITAL | Age: 68
Setting detail: RECURRING SERIES
Discharge: HOME | End: 2023-10-14
Payer: COMMERCIAL

## 2023-10-11 PROCEDURE — 97016 VASOPNEUMATIC DEVICE THERAPY: CPT

## 2023-10-11 PROCEDURE — 97535 SELF CARE MNGMENT TRAINING: CPT

## 2023-10-12 NOTE — PROGRESS NOTES
PHYSICAL THERAPY/OCCUPATIONAL THERAPY - DAILY TREATMENT NOTE (updated 3/23)    Date: 10/11/2023        Patient Name:  Lucy Hopson :  1955   Medical   Diagnosis:  Lymphedema, not elsewhere classified [I89.0]  Venous insufficiency (chronic) (peripheral) [I87.2] Treatment Diagnosis:  Lymphedema, not elsewhere classified (I89.0)   Referral Source:  Valeria Castaneda DO Insurance:   Payor: [facility] / Plan: Sultana Martini / Product Type: *No Product type* /                   Patient  verified yes     Visit #   Current  / Total 7 2 out of 6 with new recertification 5114   Time   In / Out 10:40 am 11:40 am   Total Treatment Time 60   Total Timed Codes 20     SUBJECTIVE    Pain Level (0-10 scale): B LE 7/10   Any medication changes, allergies to medications, adverse drug reactions, diagnosis change, or new procedure performed?: [x] No    [] Yes (see summary sheet for update)  Medications: Verified on Patient Summary List    Subjective functional status/changes:     Patient reports that she has been working with her compression garments and can see improvement in the drainage from her chronic left lower extremity wound has been less. Patient reports some activities (example driving) are harder for her to perform with velcro compression wraps in place so she asked for additional Comperm to use when needed. Patient able to feel relief from her symptoms with vasopneumatic device demonstration today and would like to proceed with ordering device for home use after she discusses with her PCP next week at her follow up visit. OBJECTIVE    Therapeutic Procedures:   Tx Min Billable or 1:1 Min (if diff from Tx Min) Procedure, Rationale, Specifics   20 20 51729 Self Care/Home Management (timed):  improve patient knowledge and understanding of pain reducing techniques, positioning, home safety, and activity modification  to improve patient's ability to progress to PLOF and address remaining functional

## 2023-10-18 ENCOUNTER — OFFICE VISIT (OUTPATIENT)
Age: 68
End: 2023-10-18
Payer: COMMERCIAL

## 2023-10-18 VITALS
WEIGHT: 293 LBS | BODY MASS INDEX: 57.52 KG/M2 | SYSTOLIC BLOOD PRESSURE: 142 MMHG | HEART RATE: 98 BPM | TEMPERATURE: 98 F | HEIGHT: 60 IN | RESPIRATION RATE: 18 BRPM | OXYGEN SATURATION: 99 % | DIASTOLIC BLOOD PRESSURE: 83 MMHG

## 2023-10-18 DIAGNOSIS — I10 ESSENTIAL (PRIMARY) HYPERTENSION: ICD-10-CM

## 2023-10-18 DIAGNOSIS — R73.03 PREDIABETES: ICD-10-CM

## 2023-10-18 DIAGNOSIS — E78.00 PURE HYPERCHOLESTEROLEMIA, UNSPECIFIED: ICD-10-CM

## 2023-10-18 DIAGNOSIS — E66.01 MORBID (SEVERE) OBESITY DUE TO EXCESS CALORIES (HCC): ICD-10-CM

## 2023-10-18 DIAGNOSIS — Z23 FLU VACCINE NEED: Primary | ICD-10-CM

## 2023-10-18 DIAGNOSIS — J44.9 CHRONIC OBSTRUCTIVE PULMONARY DISEASE, UNSPECIFIED COPD TYPE (HCC): ICD-10-CM

## 2023-10-18 DIAGNOSIS — I87.2 VENOUS INSUFFICIENCY (CHRONIC) (PERIPHERAL): ICD-10-CM

## 2023-10-18 DIAGNOSIS — I10 ESSENTIAL HYPERTENSION: Primary | ICD-10-CM

## 2023-10-18 PROCEDURE — 3079F DIAST BP 80-89 MM HG: CPT | Performed by: INTERNAL MEDICINE

## 2023-10-18 PROCEDURE — 90694 VACC AIIV4 NO PRSRV 0.5ML IM: CPT | Performed by: INTERNAL MEDICINE

## 2023-10-18 PROCEDURE — 90471 IMMUNIZATION ADMIN: CPT | Performed by: INTERNAL MEDICINE

## 2023-10-18 PROCEDURE — 3077F SYST BP >= 140 MM HG: CPT | Performed by: INTERNAL MEDICINE

## 2023-10-18 PROCEDURE — 99214 OFFICE O/P EST MOD 30 MIN: CPT | Performed by: INTERNAL MEDICINE

## 2023-10-18 PROCEDURE — 1123F ACP DISCUSS/DSCN MKR DOCD: CPT | Performed by: INTERNAL MEDICINE

## 2023-10-18 RX ORDER — LOSARTAN POTASSIUM 50 MG/1
50 TABLET ORAL DAILY
Qty: 90 TABLET | Refills: 1 | Status: SHIPPED | OUTPATIENT
Start: 2023-10-18

## 2023-10-18 RX ORDER — LOVASTATIN 40 MG/1
40 TABLET ORAL NIGHTLY
Qty: 90 TABLET | Refills: 1 | Status: SHIPPED | OUTPATIENT
Start: 2023-10-18

## 2023-10-18 RX ORDER — MONTELUKAST SODIUM 10 MG/1
10 TABLET ORAL DAILY
Qty: 90 TABLET | Refills: 1 | Status: SHIPPED | OUTPATIENT
Start: 2023-10-18

## 2023-10-18 RX ORDER — FUROSEMIDE 20 MG/1
20 TABLET ORAL DAILY
Qty: 90 TABLET | Refills: 1 | Status: SHIPPED | OUTPATIENT
Start: 2023-10-18

## 2023-10-18 SDOH — ECONOMIC STABILITY: INCOME INSECURITY: HOW HARD IS IT FOR YOU TO PAY FOR THE VERY BASICS LIKE FOOD, HOUSING, MEDICAL CARE, AND HEATING?: NOT HARD AT ALL

## 2023-10-18 SDOH — ECONOMIC STABILITY: FOOD INSECURITY: WITHIN THE PAST 12 MONTHS, YOU WORRIED THAT YOUR FOOD WOULD RUN OUT BEFORE YOU GOT MONEY TO BUY MORE.: NEVER TRUE

## 2023-10-18 SDOH — ECONOMIC STABILITY: HOUSING INSECURITY
IN THE LAST 12 MONTHS, WAS THERE A TIME WHEN YOU DID NOT HAVE A STEADY PLACE TO SLEEP OR SLEPT IN A SHELTER (INCLUDING NOW)?: NO

## 2023-10-18 SDOH — ECONOMIC STABILITY: FOOD INSECURITY: WITHIN THE PAST 12 MONTHS, THE FOOD YOU BOUGHT JUST DIDN'T LAST AND YOU DIDN'T HAVE MONEY TO GET MORE.: NEVER TRUE

## 2023-10-18 ASSESSMENT — ENCOUNTER SYMPTOMS
SHORTNESS OF BREATH: 1
GASTROINTESTINAL NEGATIVE: 1
ALLERGIC/IMMUNOLOGIC NEGATIVE: 1
ABDOMINAL PAIN: 0
EYES NEGATIVE: 1

## 2023-10-18 NOTE — PROGRESS NOTES
1. \"Have you been to the ER, urgent care clinic since your last visit? Hospitalized since your last visit? \"   NO    2. \"Have you seen or consulted any other health care providers outside of the [address] [place] [address] since your last visit? \"   NO    3. For patients aged 43-73: Has the patient had a colonoscopy / FIT/ Cologuard? YES    If the patient is female:    4. For patients aged 43-66: Has the patient had a mammogram within the past 2 years? YES    5. For patients aged 21-65: Has the patient had a pap smear?    N/A

## 2023-10-18 NOTE — PROGRESS NOTES
Radha López is a 79 y.o. female who presents today for the following:  Chief Complaint   Patient presents with    Hypertension    Asthma    Sleep Apnea         Pt. comes in for f/u. Has a few chronic medical issues as documented. Has chronic bilateral lower extremity venous insufficiency/stasis/lymphedema. Legs are wrapped by lymphedema clinic on regular basis. Skin ulcer on left side has mostly healed. Denies any pain. She is morbidly obese with BMI close to 60. Uses a walker when outside the house. Denies any falls. Respiratory status/asthma has been stable. Uses inhalers. All other chronic medical issues are stable on current treatment regimen. Has had Covid-19 vaccination. Reports taking proper precautions. Denies any related signs or symptoms. PMH/PSH/Allergies/Social History/medication list and most recent studies reviewed with patient. Social History     Tobacco Use   Smoking Status Every Day    Packs/day: 0.25    Years: 20.00    Additional pack years: 0.00    Total pack years: 5.00    Types: Cigarettes   Smokeless Tobacco Never     Social History     Substance and Sexual Activity   Alcohol Use No    Alcohol/week: 0.0 standard drinks of alcohol         Reports compliance with medications and diet. Trying to be active physically to control weight. Reports no other new c/o.      Past Medical History:   Diagnosis Date    Angina decubitus     Arthritis     KNEES    Asthma     COVID-19 vaccine series completed     Pfizer dose #1 received on 4/12/2021; dose #2 received on 5/3/2021    H/O seasonal allergies     History of blood transfusion EARLY 1980'S    [facility], NO REACTION    Hypercholesterolemia     Hypertension     IBS (irritable bowel syndrome)     Morbid obesity with BMI of 50.0-59.9, adult ([facility])     BMI 57    Nausea & vomiting     WITH ALL 3 SURGERIES    Psoriasis     Recurrent umbilical hernia 7/54/6755    Sleep apnea     DOESN'T USE CPAP; SLEEPS IN

## 2023-10-18 NOTE — TELEPHONE ENCOUNTER
Requested Prescriptions     Pending Prescriptions Disp Refills    montelukast (SINGULAIR) 10 MG tablet [Pharmacy Med Name: MONTELUKAST SODIUM 10MG TABS] 90 tablet 1     Sig: TAKE ONE TABLET BY MOUTH ONCE A DAY    losartan (COZAAR) 50 MG tablet [Pharmacy Med Name: LOSARTAN POTASSIUM 50MG TABS] 90 tablet 1     Sig: TAKE ONE TABLET BY MOUTH ONCE A 777 [facility], [address] [place] [address], Suite [facility] Armida Mcdonald [phone] - F [phone]  [address] [place] [address]  Phone: [phone] Fax: [phone]       Last appt 3/31/2023      Future Appointments   Date Time Provider [address] [place] [address]   10/18/2023 10:30 AM DO RICH Johnson BS AMB   10/26/2023 12:50 PM Yuki Torres,[address]Th [address] Yuki Torres,[address]   11/3/2023 10:30 AM Khloe Robison,  [facility]

## 2023-10-19 ENCOUNTER — APPOINTMENT (OUTPATIENT)
Facility: HOSPITAL | Age: 68
End: 2023-10-19
Payer: COMMERCIAL

## 2023-10-26 ENCOUNTER — HOSPITAL ENCOUNTER (OUTPATIENT)
Facility: HOSPITAL | Age: 68
Discharge: HOME | End: 2023-10-26
Attending: INTERNAL MEDICINE
Payer: COMMERCIAL

## 2023-10-26 VITALS — HEIGHT: 60 IN | WEIGHT: 293 LBS | BODY MASS INDEX: 57.52 KG/M2

## 2023-10-26 DIAGNOSIS — Z98.890 HISTORY OF BREAST LUMP/MASS EXCISION: ICD-10-CM

## 2023-10-26 DIAGNOSIS — Z87.898 HISTORY OF LUMP IN BREAST: ICD-10-CM

## 2023-10-26 PROCEDURE — 77067 SCR MAMMO BI INCL CAD: CPT

## 2024-01-31 DIAGNOSIS — J44.9 CHRONIC OBSTRUCTIVE PULMONARY DISEASE, UNSPECIFIED COPD TYPE (HCC): ICD-10-CM

## 2024-01-31 DIAGNOSIS — E78.2 MIXED HYPERLIPIDEMIA: Primary | ICD-10-CM

## 2024-01-31 DIAGNOSIS — I10 ESSENTIAL HYPERTENSION: ICD-10-CM

## 2024-01-31 NOTE — TELEPHONE ENCOUNTER
[facility] - [place], [place] - [address] [facility] - P [phone] - F [phone]     90 Day supply each    furosemide (LASIX) 20 MG tablet   losartan (COZAAR) 50 MG tablet   lovastatin (MEVACOR) 40 MG tablet     montelukast (SINGULAIR) 10 MG tablet   potassium chloride (KLOR-CON M) 20 MEQ extended release tablet   triamterene-hydroCHLOROthiazide (MAXZIDE-25) 37.5-25 MG per tablet     10/18/2023  No upcoming

## 2024-02-01 RX ORDER — MONTELUKAST SODIUM 10 MG/1
10 TABLET ORAL DAILY
Qty: 90 TABLET | Refills: 1 | Status: SHIPPED | OUTPATIENT
Start: 2024-02-01

## 2024-02-01 RX ORDER — LOVASTATIN 40 MG/1
40 TABLET ORAL NIGHTLY
Qty: 90 TABLET | Refills: 1 | Status: SHIPPED | OUTPATIENT
Start: 2024-02-01

## 2024-02-01 RX ORDER — LOSARTAN POTASSIUM 50 MG/1
50 TABLET ORAL DAILY
Qty: 90 TABLET | Refills: 1 | Status: SHIPPED | OUTPATIENT
Start: 2024-02-01

## 2024-02-01 RX ORDER — FUROSEMIDE 20 MG/1
20 TABLET ORAL DAILY
Qty: 90 TABLET | Refills: 1 | Status: SHIPPED | OUTPATIENT
Start: 2024-02-01

## 2024-02-01 RX ORDER — TRIAMTERENE AND HYDROCHLOROTHIAZIDE 37.5; 25 MG/1; MG/1
1 TABLET ORAL DAILY
Qty: 90 TABLET | Refills: 1 | Status: SHIPPED | OUTPATIENT
Start: 2024-02-01

## 2024-02-01 RX ORDER — POTASSIUM CHLORIDE 20 MEQ/1
20 TABLET, EXTENDED RELEASE ORAL DAILY
Qty: 90 TABLET | Refills: 1 | Status: SHIPPED | OUTPATIENT
Start: 2024-02-01

## 2024-04-23 ENCOUNTER — OFFICE VISIT (OUTPATIENT)
Age: 69
End: 2024-04-23
Payer: MEDICARE

## 2024-04-23 VITALS
OXYGEN SATURATION: 93 % | SYSTOLIC BLOOD PRESSURE: 138 MMHG | DIASTOLIC BLOOD PRESSURE: 72 MMHG | HEART RATE: 92 BPM | BODY MASS INDEX: 57.52 KG/M2 | WEIGHT: 293 LBS | HEIGHT: 60 IN

## 2024-04-23 DIAGNOSIS — Z00.00 MEDICARE ANNUAL WELLNESS VISIT, INITIAL: ICD-10-CM

## 2024-04-23 DIAGNOSIS — L40.9 PSORIASIS: ICD-10-CM

## 2024-04-23 DIAGNOSIS — M79.675 TOE PAIN, LEFT: ICD-10-CM

## 2024-04-23 DIAGNOSIS — J44.9 CHRONIC OBSTRUCTIVE PULMONARY DISEASE, UNSPECIFIED COPD TYPE (HCC): ICD-10-CM

## 2024-04-23 DIAGNOSIS — I87.2 VENOUS INSUFFICIENCY (CHRONIC) (PERIPHERAL): ICD-10-CM

## 2024-04-23 DIAGNOSIS — R26.81 GAIT INSTABILITY: ICD-10-CM

## 2024-04-23 DIAGNOSIS — R73.03 PREDIABETES: ICD-10-CM

## 2024-04-23 DIAGNOSIS — I10 ESSENTIAL HYPERTENSION: ICD-10-CM

## 2024-04-23 DIAGNOSIS — M54.2 NECK PAIN, BILATERAL: Primary | ICD-10-CM

## 2024-04-23 PROBLEM — L97.929 VENOUS ULCER OF LEFT LEG (HCC): Status: RESOLVED | Noted: 2023-03-31 | Resolved: 2024-04-23

## 2024-04-23 PROBLEM — I83.029 VENOUS ULCER OF LEFT LEG (HCC): Status: RESOLVED | Noted: 2023-03-31 | Resolved: 2024-04-23

## 2024-04-23 PROCEDURE — G8400 PT W/DXA NO RESULTS DOC: HCPCS | Performed by: INTERNAL MEDICINE

## 2024-04-23 PROCEDURE — 99214 OFFICE O/P EST MOD 30 MIN: CPT | Performed by: INTERNAL MEDICINE

## 2024-04-23 PROCEDURE — 4004F PT TOBACCO SCREEN RCVD TLK: CPT | Performed by: INTERNAL MEDICINE

## 2024-04-23 PROCEDURE — 1123F ACP DISCUSS/DSCN MKR DOCD: CPT | Performed by: INTERNAL MEDICINE

## 2024-04-23 PROCEDURE — 3023F SPIROM DOC REV: CPT | Performed by: INTERNAL MEDICINE

## 2024-04-23 PROCEDURE — G8417 CALC BMI ABV UP PARAM F/U: HCPCS | Performed by: INTERNAL MEDICINE

## 2024-04-23 PROCEDURE — 1090F PRES/ABSN URINE INCON ASSESS: CPT | Performed by: INTERNAL MEDICINE

## 2024-04-23 PROCEDURE — 3078F DIAST BP <80 MM HG: CPT | Performed by: INTERNAL MEDICINE

## 2024-04-23 PROCEDURE — 3075F SYST BP GE 130 - 139MM HG: CPT | Performed by: INTERNAL MEDICINE

## 2024-04-23 PROCEDURE — G8427 DOCREV CUR MEDS BY ELIG CLIN: HCPCS | Performed by: INTERNAL MEDICINE

## 2024-04-23 PROCEDURE — 3017F COLORECTAL CA SCREEN DOC REV: CPT | Performed by: INTERNAL MEDICINE

## 2024-04-23 PROCEDURE — G0439 PPPS, SUBSEQ VISIT: HCPCS | Performed by: INTERNAL MEDICINE

## 2024-04-23 RX ORDER — METHYLPREDNISOLONE 4 MG/1
TABLET ORAL
Qty: 1 KIT | Refills: 0 | Status: SHIPPED | OUTPATIENT
Start: 2024-04-23 | End: 2024-04-29

## 2024-04-23 RX ORDER — MELOXICAM 7.5 MG/1
7.5 TABLET ORAL DAILY
Qty: 15 TABLET | Refills: 0 | Status: SHIPPED | OUTPATIENT
Start: 2024-04-23 | End: 2024-05-03 | Stop reason: SDUPTHER

## 2024-04-23 RX ORDER — TIZANIDINE 2 MG/1
2 TABLET ORAL 3 TIMES DAILY PRN
Qty: 30 TABLET | Refills: 1 | Status: SHIPPED | OUTPATIENT
Start: 2024-04-23

## 2024-04-23 RX ORDER — CLOBETASOL PROPIONATE 0.5 MG/G
CREAM TOPICAL 2 TIMES DAILY
Qty: 60 G | Refills: 0 | Status: SHIPPED | OUTPATIENT
Start: 2024-04-23

## 2024-04-23 ASSESSMENT — ANXIETY QUESTIONNAIRES
3. WORRYING TOO MUCH ABOUT DIFFERENT THINGS: NOT AT ALL
5. BEING SO RESTLESS THAT IT IS HARD TO SIT STILL: NOT AT ALL
GAD7 TOTAL SCORE: 1
7. FEELING AFRAID AS IF SOMETHING AWFUL MIGHT HAPPEN: NOT AT ALL
2. NOT BEING ABLE TO STOP OR CONTROL WORRYING: NOT AT ALL
IF YOU CHECKED OFF ANY PROBLEMS ON THIS QUESTIONNAIRE, HOW DIFFICULT HAVE THESE PROBLEMS MADE IT FOR YOU TO DO YOUR WORK, TAKE CARE OF THINGS AT HOME, OR GET ALONG WITH OTHER PEOPLE: NOT DIFFICULT AT ALL
6. BECOMING EASILY ANNOYED OR IRRITABLE: NOT AT ALL
4. TROUBLE RELAXING: NOT AT ALL
1. FEELING NERVOUS, ANXIOUS, OR ON EDGE: SEVERAL DAYS

## 2024-04-23 ASSESSMENT — PATIENT HEALTH QUESTIONNAIRE - PHQ9
SUM OF ALL RESPONSES TO PHQ QUESTIONS 1-9: 1
SUM OF ALL RESPONSES TO PHQ QUESTIONS 1-9: 1
SUM OF ALL RESPONSES TO PHQ9 QUESTIONS 1 & 2: 1
2. FEELING DOWN, DEPRESSED OR HOPELESS: SEVERAL DAYS
SUM OF ALL RESPONSES TO PHQ QUESTIONS 1-9: 1
SUM OF ALL RESPONSES TO PHQ QUESTIONS 1-9: 1
1. LITTLE INTEREST OR PLEASURE IN DOING THINGS: NOT AT ALL

## 2024-04-23 NOTE — PROGRESS NOTES
Chief Complaint   Patient presents with    6 Month Follow-Up    Neck Pain    Toe Injury     Left foot      \"Have you been to the ER, urgent care clinic since your last visit?  Hospitalized since your last visit?\"    YES - When: approximately 2 months ago.  Where and Why: [facility] - Neck Pain.    “Have you seen or consulted any other health care providers [facility] since [facility]?”    NO            Click Here for Release of Records Request

## 2024-04-24 ENCOUNTER — TELEPHONE (OUTPATIENT)
Age: 69
End: 2024-04-24

## 2024-04-24 NOTE — TELEPHONE ENCOUNTER
Called and spoke with patient and discussed medications. She will monitor and call with any changes.

## 2024-04-24 NOTE — TELEPHONE ENCOUNTER
Pt got new script for Meloxicam 7.5mg 2x per day. Pt wants to know if she should continue to take her baby asprin with this medication?

## 2024-04-29 ASSESSMENT — ENCOUNTER SYMPTOMS
ALLERGIC/IMMUNOLOGIC NEGATIVE: 1
GASTROINTESTINAL NEGATIVE: 1
ABDOMINAL PAIN: 0
SHORTNESS OF BREATH: 1
EYES NEGATIVE: 1

## 2024-04-30 NOTE — PROGRESS NOTES
Subjective    Nadia Solitario is a 68 y.o. female who presents today for the following:  Chief Complaint   Patient presents with    6 Month Follow-Up    Neck Pain    Toe Injury     Left foot        History of Present Illness  The patient presents for evaluation of multiple medical concerns.    The patient began experiencing bilateral neck pain in the middle of 02/2024, which has progressively worsened. She experienced a pulling sensation while driving, which has prevented her from driving. 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 She was referred to [facility], but the earliest available appointment was at the end of 05/2024. Despite requesting to place her on a waiting list, she has not received a response. She was prescribed methocarbamol, which induced dizziness and constipation. The possibility of prednisone was also discussed. She has been managing her pain with six extra strength Tylenol and 2 Advil at night to aid sleep. The pain does not radiate to her shoulders and arms, and she denies any numbness, tingling, weakness, or dropping items. She denies any injury or accident. She has noticed a bulge on the side of her neck, which she attributes to tense muscles. She sleeps in a recliner due to sleep apnea and hopes she can sleep for 6 to 8 hours per night. However, since the onset of her neck pain, the pain disrupts her sleep when the Tylenol wears off. If she lies in the recliner back, she experiences pressure in her head, which exacerbates her neck pain. She uses a walker for balance and denies any falls.    The patient experienced a flare-up of her psoriasis on the back of her neck approximately one month ago, which is currently healing. She has been applying clobetasol cream, but she has exhausted her supply.    The patient suspects a stress fracture on her left second toe, adjacent to her big toe. She has been

## 2024-04-30 NOTE — PROGRESS NOTES
Medicare Annual Wellness Visit    Nadia Solitario is here for 6 Month Follow-Up, Neck Pain, and Toe Injury (Left foot )    Assessment & Plan   Neck pain, bilateral  Psoriasis  Body mass index (BMI) 50.0-59.9, adult (HCC)  Essential hypertension  Chronic obstructive pulmonary disease, unspecified COPD type (HCC)  Venous insufficiency (chronic) (peripheral)  Prediabetes  Gait instability  Toe pain, left  Medicare annual wellness visit, initial    Recommendations for Preventive Services Due: see orders and patient instructions/AVS.  Recommended screening schedule for the next 5-10 years is provided to the patient in written form: see Patient Instructions/AVS.     Return in about 6 months (around 10/23/2024).     Subjective       Patient's complete Health Risk Assessment and screening values have been reviewed and are found in Flowsheets. The following problems were reviewed today and where indicated follow up appointments were made and/or referrals ordered.    Positive Risk Factor Screenings with Interventions:                Activity, Diet, and Weight:               Body mass index is 57.77 kg/m². (!) Abnormal      Obesity Interventions:                    Tobacco Use:  Tobacco Use: High Risk (4/23/2024)    Patient History     Smoking Tobacco Use: Every Day     Smokeless Tobacco Use: Never     Passive Exposure: Not on file     E-cigarette/Vaping       Questions Responses    E-cigarette/Vaping Use     Start Date     Passive Exposure     Quit Date     Counseling Given     Comments           Interventions:                        Objective   Vitals:    04/23/24 1117   BP: 138/72   Pulse: 92   SpO2: 93%   Weight: 134.2 kg (295 lb 12.8 oz)   Height: 1.524 m (5')      Body mass index is 57.77 kg/m².             Allergies   Allergen Reactions    Codeine Shortness Of Breath and Swelling    Penicillins Shortness Of Breath and Swelling    Scopolamine Swelling    Adhesive Tape Rash and Swelling     Prior to Visit Medications

## 2024-05-03 ENCOUNTER — TELEMEDICINE (OUTPATIENT)
Age: 69
End: 2024-05-03
Payer: MEDICARE

## 2024-05-03 DIAGNOSIS — I10 ESSENTIAL HYPERTENSION: ICD-10-CM

## 2024-05-03 DIAGNOSIS — I87.2 VENOUS INSUFFICIENCY (CHRONIC) (PERIPHERAL): ICD-10-CM

## 2024-05-03 DIAGNOSIS — M54.2 NECK PAIN, BILATERAL: ICD-10-CM

## 2024-05-03 DIAGNOSIS — L40.9 PSORIASIS: Primary | ICD-10-CM

## 2024-05-03 PROCEDURE — 99214 OFFICE O/P EST MOD 30 MIN: CPT | Performed by: INTERNAL MEDICINE

## 2024-05-03 PROCEDURE — G8417 CALC BMI ABV UP PARAM F/U: HCPCS | Performed by: INTERNAL MEDICINE

## 2024-05-03 PROCEDURE — 1123F ACP DISCUSS/DSCN MKR DOCD: CPT | Performed by: INTERNAL MEDICINE

## 2024-05-03 PROCEDURE — G8427 DOCREV CUR MEDS BY ELIG CLIN: HCPCS | Performed by: INTERNAL MEDICINE

## 2024-05-03 PROCEDURE — 4004F PT TOBACCO SCREEN RCVD TLK: CPT | Performed by: INTERNAL MEDICINE

## 2024-05-03 PROCEDURE — 1090F PRES/ABSN URINE INCON ASSESS: CPT | Performed by: INTERNAL MEDICINE

## 2024-05-03 PROCEDURE — 3017F COLORECTAL CA SCREEN DOC REV: CPT | Performed by: INTERNAL MEDICINE

## 2024-05-03 PROCEDURE — G8400 PT W/DXA NO RESULTS DOC: HCPCS | Performed by: INTERNAL MEDICINE

## 2024-05-03 RX ORDER — PREDNISONE 20 MG/1
20 TABLET ORAL DAILY PRN
Qty: 15 TABLET | Refills: 0 | Status: SHIPPED | OUTPATIENT
Start: 2024-05-03 | End: 2024-05-18

## 2024-05-03 RX ORDER — MELOXICAM 7.5 MG/1
7.5 TABLET ORAL DAILY
Qty: 30 TABLET | Refills: 0 | Status: SHIPPED | OUTPATIENT
Start: 2024-05-03

## 2024-05-03 NOTE — PROGRESS NOTES
Chief Complaint   Patient presents with    Neck Pain     \"Have you been to the ER, urgent care clinic since your last visit?  Hospitalized since your last visit?\"    NO    “Have you seen or consulted any other health care providers outside of [facility] since your last visit?”    NO            Click Here for Release of Records Request    
atraumatic  [] Abnormal -   [x] Mouth/Throat: Mucous membranes are moist    External Ears [x] Normal  [] Abnormal -    Neck: [x] No visualized mass [] Abnormal -     Pulmonary/Chest: [x] Respiratory effort normal   [x] No visualized signs of difficulty breathing or respiratory distress        [] Abnormal -      Musculoskeletal:   [x] Normal gait with no signs of ataxia         [x] Normal range of motion of neck        [] Abnormal -     Neurological:        [x] No Facial Asymmetry (Cranial nerve 7 motor function) (limited exam due to video visit)          [x] No gaze palsy        [] Abnormal -          Skin:        [x] No significant exanthematous lesions or discoloration noted on facial skin         [] Abnormal -            Psychiatric:       [x] Normal Affect [] Abnormal -        [x] No Hallucinations    Other pertinent observable physical exam findings:-         On this date 5/3/2024 I have spent 30 minutes reviewing previous notes, test results and face to face (virtual) with the patient discussing the diagnosis and importance of compliance with the treatment plan as well as documenting on the day of the visit.    --Beau Roberson DO

## 2024-05-30 RX ORDER — TIZANIDINE 2 MG/1
2 TABLET ORAL 3 TIMES DAILY PRN
Qty: 30 TABLET | Refills: 1 | Status: SHIPPED | OUTPATIENT
Start: 2024-05-30

## 2024-05-30 RX ORDER — MELOXICAM 7.5 MG/1
7.5 TABLET ORAL DAILY
Qty: 30 TABLET | Refills: 0 | Status: SHIPPED | OUTPATIENT
Start: 2024-05-30

## 2024-05-30 NOTE — TELEPHONE ENCOUNTER
LOV: 05/03/2024  NOV: None    Medication: meloxicam (MOBIC) 7.5 MG tablet   Quantity: 30    Medication: tiZANidine (ZANAFLEX) 2 MG tablet    Quantity: 30    Pharmacy: [facility] #[id number] - [place]  [address] - P [phone] - F [phone] [[id number]]

## 2024-06-06 ENCOUNTER — TELEPHONE (OUTPATIENT)
Age: 69
End: 2024-06-06

## 2024-06-06 NOTE — TELEPHONE ENCOUNTER
LOV: 05/03/2024  NOV: None    Medication: methylPREDNISolone (MEDROL DOSEPACK) 4 MG tablet   Quantity: ?    Pharmacy: [facility]ACY #[id number] - [place], [place] - [address] - P [phone] - F [phone] [[id number]]

## 2024-06-07 RX ORDER — METHYLPREDNISOLONE 4 MG/1
TABLET ORAL
Qty: 1 KIT | Refills: 0 | Status: SHIPPED | OUTPATIENT
Start: 2024-06-07 | End: 2024-06-13

## 2024-06-07 NOTE — TELEPHONE ENCOUNTER
Pt is requesting to start tapering off Prednisone. Pt states provider offered this medication if she decided she wanted to do this.

## 2024-06-20 DIAGNOSIS — M54.2 NECK PAIN, BILATERAL: Primary | ICD-10-CM

## 2024-06-20 RX ORDER — MELOXICAM 7.5 MG/1
7.5 TABLET ORAL DAILY
Qty: 90 TABLET | Refills: 1 | Status: SHIPPED | OUTPATIENT
Start: 2024-06-20

## 2024-08-16 DIAGNOSIS — J44.9 CHRONIC OBSTRUCTIVE PULMONARY DISEASE, UNSPECIFIED COPD TYPE (HCC): ICD-10-CM

## 2024-08-16 DIAGNOSIS — I10 ESSENTIAL HYPERTENSION: ICD-10-CM

## 2024-08-16 DIAGNOSIS — E78.2 MIXED HYPERLIPIDEMIA: ICD-10-CM

## 2024-08-16 RX ORDER — FUROSEMIDE 20 MG/1
20 TABLET ORAL DAILY
Qty: 90 TABLET | Refills: 1 | Status: SHIPPED | OUTPATIENT
Start: 2024-08-16

## 2024-08-16 RX ORDER — MONTELUKAST SODIUM 10 MG/1
10 TABLET ORAL DAILY
Qty: 90 TABLET | Refills: 1 | Status: SHIPPED | OUTPATIENT
Start: 2024-08-16

## 2024-08-16 RX ORDER — TRIAMTERENE AND HYDROCHLOROTHIAZIDE 37.5; 25 MG/1; MG/1
1 TABLET ORAL DAILY
Qty: 90 TABLET | Refills: 1 | Status: SHIPPED | OUTPATIENT
Start: 2024-08-16

## 2024-08-16 RX ORDER — LOSARTAN POTASSIUM 50 MG/1
50 TABLET ORAL DAILY
Qty: 90 TABLET | Refills: 1 | Status: SHIPPED | OUTPATIENT
Start: 2024-08-16

## 2024-08-16 RX ORDER — LOVASTATIN 40 MG/1
40 TABLET ORAL NIGHTLY
Qty: 90 TABLET | Refills: 1 | Status: SHIPPED | OUTPATIENT
Start: 2024-08-16

## 2024-08-20 RX ORDER — TIZANIDINE 2 MG/1
TABLET ORAL
Qty: 30 TABLET | Refills: 5 | Status: SHIPPED | OUTPATIENT
Start: 2024-08-20

## 2024-10-09 ENCOUNTER — OFFICE VISIT (OUTPATIENT)
Age: 69
End: 2024-10-09
Payer: MEDICARE

## 2024-10-09 VITALS
HEIGHT: 60 IN | SYSTOLIC BLOOD PRESSURE: 142 MMHG | OXYGEN SATURATION: 92 % | WEIGHT: 293 LBS | TEMPERATURE: 97.7 F | DIASTOLIC BLOOD PRESSURE: 70 MMHG | HEART RATE: 93 BPM | BODY MASS INDEX: 57.52 KG/M2

## 2024-10-09 DIAGNOSIS — R26.81 GAIT INSTABILITY: ICD-10-CM

## 2024-10-09 DIAGNOSIS — I89.0 LYMPHEDEMA OF LEG: ICD-10-CM

## 2024-10-09 DIAGNOSIS — R73.03 PREDIABETES: ICD-10-CM

## 2024-10-09 DIAGNOSIS — E78.2 MIXED HYPERLIPIDEMIA: ICD-10-CM

## 2024-10-09 DIAGNOSIS — L40.9 PSORIASIS: ICD-10-CM

## 2024-10-09 DIAGNOSIS — J44.9 CHRONIC OBSTRUCTIVE PULMONARY DISEASE, UNSPECIFIED COPD TYPE (HCC): ICD-10-CM

## 2024-10-09 DIAGNOSIS — I10 ESSENTIAL HYPERTENSION: Primary | ICD-10-CM

## 2024-10-09 PROCEDURE — 99214 OFFICE O/P EST MOD 30 MIN: CPT | Performed by: INTERNAL MEDICINE

## 2024-10-09 PROCEDURE — 90653 IIV ADJUVANT VACCINE IM: CPT | Performed by: INTERNAL MEDICINE

## 2024-10-09 RX ORDER — FOLIC ACID 1 MG/1
1000 TABLET ORAL
COMMUNITY
Start: 2024-08-28 | End: 2024-11-26

## 2024-10-09 RX ORDER — METHOTREXATE 2.5 MG/1
TABLET ORAL
COMMUNITY
Start: 2024-09-21

## 2024-10-09 RX ORDER — MAGNESIUM OXIDE 400 MG/1
400 TABLET ORAL DAILY
Qty: 90 TABLET | Refills: 1 | Status: SHIPPED | OUTPATIENT
Start: 2024-10-09

## 2024-10-09 ASSESSMENT — ENCOUNTER SYMPTOMS
ALLERGIC/IMMUNOLOGIC NEGATIVE: 1
EYES NEGATIVE: 1
GASTROINTESTINAL NEGATIVE: 1
SHORTNESS OF BREATH: 1
ABDOMINAL PAIN: 0

## 2024-10-09 NOTE — PROGRESS NOTES
Subjective    Nadia Solitario is a 69 y.o. female who presents today for the following:  Chief Complaint   Patient presents with    Follow-up       History of Present Illness  The patient presents for evaluation of multiple medical concerns.    She reports experiencing pain in the back of her thighs and knees, which she attributes to venous insufficiency and lymphedema. This pain, described as an ache, disrupts her sleep after approximately 5 hours. She continues to sleep in a chair and finds that moving around during the day alleviates the discomfort. However, elevating her feet seems to exacerbate the pain. She reports no recent injuries or falls.    She has arthritis in her feet, knees, hands, and back. She is under the care of a rheumatologist and is currently taking methotrexate (3 tablets in the morning and 3 at night once a week) and folic acid. She also takes meloxicam, tizanidine (once daily), Singulair, vitamin D, potassium, and drinks plenty of water. She has an appointment with her rheumatologist in December 2024 for a complete blood count (CBC) with chemistry panel and A1c.    She was discharged from the lymphedema clinic but plans to return in a month or two for refitting.    Her blood pressure, monitored at home, is typically around 130/80. She has discontinued aspirin and has not seen a cardiologist since her stress-induced angina. A previous cardiac catheterization revealed no blockages.    She has not needed to use her inhaler or nebulizer and has not used her CPAP machine. She has only used her inhaler 2 or 3 times in the last 6 months and reports higher oxygen levels.    She has psoriasis on her scalp and has discussed injection therapy with her doctor.    She is due for a flu shot and has been advised by her rheumatologist to avoid the nasal vaccine due to her current medication regimen.    SOCIAL HISTORY  She smokes 3 or 4 cigarettes a day. She does not drink alcohol.    FAMILY HISTORY  Both

## 2024-10-09 NOTE — PROGRESS NOTES
Chief Complaint   Patient presents with    Follow-up     \"Have you been to the ER, urgent care clinic since your last visit?  Hospitalized since your last visit?\"    No    “Have you seen or consulted any other health care providers outside our system since your last visit?”    NO

## 2024-10-16 ENCOUNTER — TELEPHONE (OUTPATIENT)
Age: 69
End: 2024-10-16

## 2024-10-16 NOTE — TELEPHONE ENCOUNTER
Pt has question about lymphedema on left leg, changed bandages and had some areas that were yellow spots

## 2024-12-03 DIAGNOSIS — M54.2 NECK PAIN, BILATERAL: ICD-10-CM

## 2024-12-03 RX ORDER — MELOXICAM 7.5 MG/1
7.5 TABLET ORAL DAILY
Qty: 90 TABLET | Refills: 1 | Status: SHIPPED | OUTPATIENT
Start: 2024-12-03

## 2024-12-05 LAB — HBA1C MFR BLD HPLC: 5.8 %

## 2025-01-28 ENCOUNTER — TELEPHONE (OUTPATIENT)
Age: 70
End: 2025-01-28

## 2025-01-28 DIAGNOSIS — E78.2 MIXED HYPERLIPIDEMIA: ICD-10-CM

## 2025-01-28 DIAGNOSIS — J44.9 CHRONIC OBSTRUCTIVE PULMONARY DISEASE, UNSPECIFIED COPD TYPE (HCC): ICD-10-CM

## 2025-01-28 DIAGNOSIS — I10 ESSENTIAL HYPERTENSION: ICD-10-CM

## 2025-01-28 RX ORDER — MONTELUKAST SODIUM 10 MG/1
10 TABLET ORAL DAILY
Qty: 90 TABLET | Refills: 1 | Status: SHIPPED | OUTPATIENT
Start: 2025-01-28

## 2025-01-28 RX ORDER — FUROSEMIDE 20 MG/1
20 TABLET ORAL DAILY
Qty: 90 TABLET | Refills: 1 | Status: SHIPPED | OUTPATIENT
Start: 2025-01-28

## 2025-01-28 RX ORDER — LOSARTAN POTASSIUM 50 MG/1
50 TABLET ORAL DAILY
Qty: 90 TABLET | Refills: 1 | Status: SHIPPED | OUTPATIENT
Start: 2025-01-28

## 2025-01-28 RX ORDER — LOVASTATIN 40 MG/1
40 TABLET ORAL NIGHTLY
Qty: 90 TABLET | Refills: 1 | Status: SHIPPED | OUTPATIENT
Start: 2025-01-28

## 2025-01-28 NOTE — TELEPHONE ENCOUNTER
[facility] - [facility] [address] [place] [address] - P [phone] - F [phone]      lovastatin (MEVACOR) 40 MG tablet       furosemide (LASIX) 20 MG tablet       losartan (COZAAR) 50 MG tablet   triamterene-hydroCHLOROthiazide (MAXZIDE-25) 37.5-25 MG per tablet   montelukast (SINGULAIR) 10 MG tablet     10/09/2024  04/09/2025

## 2025-02-07 ENCOUNTER — TELEPHONE (OUTPATIENT)
Age: 70
End: 2025-02-07

## 2025-02-07 DIAGNOSIS — I10 ESSENTIAL HYPERTENSION: ICD-10-CM

## 2025-02-07 RX ORDER — TRIAMTERENE AND HYDROCHLOROTHIAZIDE 37.5; 25 MG/1; MG/1
1 TABLET ORAL DAILY
Qty: 90 TABLET | Refills: 1 | Status: SHIPPED | OUTPATIENT
Start: 2025-02-07

## 2025-02-07 NOTE — TELEPHONE ENCOUNTER
Lov: 10/09/2024  Nov:04/09/2025    [facility]       triamterene-hydroCHLOROthiazide (MAXZIDE-25) 37.5-25 MG per tablet

## 2025-04-08 SDOH — ECONOMIC STABILITY: FOOD INSECURITY: WITHIN THE PAST 12 MONTHS, THE FOOD YOU BOUGHT JUST DIDN'T LAST AND YOU DIDN'T HAVE MONEY TO GET MORE.: NEVER TRUE

## 2025-04-08 SDOH — HEALTH STABILITY: PHYSICAL HEALTH: ON AVERAGE, HOW MANY MINUTES DO YOU ENGAGE IN EXERCISE AT THIS LEVEL?: PATIENT DECLINED

## 2025-04-08 SDOH — HEALTH STABILITY: PHYSICAL HEALTH
ON AVERAGE, HOW MANY DAYS PER WEEK DO YOU ENGAGE IN MODERATE TO STRENUOUS EXERCISE (LIKE A BRISK WALK)?: PATIENT DECLINED

## 2025-04-08 SDOH — ECONOMIC STABILITY: INCOME INSECURITY: IN THE LAST 12 MONTHS, WAS THERE A TIME WHEN YOU WERE NOT ABLE TO PAY THE MORTGAGE OR RENT ON TIME?: NO

## 2025-04-08 SDOH — ECONOMIC STABILITY: TRANSPORTATION INSECURITY
IN THE PAST 12 MONTHS, HAS THE LACK OF TRANSPORTATION KEPT YOU FROM MEDICAL APPOINTMENTS OR FROM GETTING MEDICATIONS?: NO

## 2025-04-08 SDOH — ECONOMIC STABILITY: FOOD INSECURITY: WITHIN THE PAST 12 MONTHS, YOU WORRIED THAT YOUR FOOD WOULD RUN OUT BEFORE YOU GOT MONEY TO BUY MORE.: NEVER TRUE

## 2025-04-08 SDOH — ECONOMIC STABILITY: TRANSPORTATION INSECURITY
IN THE PAST 12 MONTHS, HAS LACK OF TRANSPORTATION KEPT YOU FROM MEETINGS, WORK, OR FROM GETTING THINGS NEEDED FOR DAILY LIVING?: NO

## 2025-04-08 ASSESSMENT — PATIENT HEALTH QUESTIONNAIRE - PHQ9
SUM OF ALL RESPONSES TO PHQ QUESTIONS 1-9: 0
1. LITTLE INTEREST OR PLEASURE IN DOING THINGS: NOT AT ALL
SUM OF ALL RESPONSES TO PHQ QUESTIONS 1-9: 0
SUM OF ALL RESPONSES TO PHQ QUESTIONS 1-9: 0
2. FEELING DOWN, DEPRESSED OR HOPELESS: NOT AT ALL
SUM OF ALL RESPONSES TO PHQ QUESTIONS 1-9: 0

## 2025-04-08 ASSESSMENT — LIFESTYLE VARIABLES
HOW OFTEN DO YOU HAVE A DRINK CONTAINING ALCOHOL: NEVER
HOW OFTEN DO YOU HAVE A DRINK CONTAINING ALCOHOL: 1
HOW OFTEN DO YOU HAVE SIX OR MORE DRINKS ON ONE OCCASION: 1
HOW MANY STANDARD DRINKS CONTAINING ALCOHOL DO YOU HAVE ON A TYPICAL DAY: 0
HOW MANY STANDARD DRINKS CONTAINING ALCOHOL DO YOU HAVE ON A TYPICAL DAY: PATIENT DOES NOT DRINK

## 2025-04-09 ENCOUNTER — OFFICE VISIT (OUTPATIENT)
Age: 70
End: 2025-04-09
Payer: MEDICARE

## 2025-04-09 VITALS
RESPIRATION RATE: 16 BRPM | SYSTOLIC BLOOD PRESSURE: 142 MMHG | WEIGHT: 286 LBS | HEIGHT: 60 IN | OXYGEN SATURATION: 93 % | HEART RATE: 96 BPM | DIASTOLIC BLOOD PRESSURE: 74 MMHG | TEMPERATURE: 97.5 F | BODY MASS INDEX: 56.15 KG/M2

## 2025-04-09 DIAGNOSIS — I89.0 LYMPHEDEMA: ICD-10-CM

## 2025-04-09 DIAGNOSIS — Z78.0 POST-MENOPAUSE: ICD-10-CM

## 2025-04-09 DIAGNOSIS — M54.2 NECK PAIN, BILATERAL: ICD-10-CM

## 2025-04-09 DIAGNOSIS — M06.9 RHEUMATOID ARTHRITIS OF OTHER SITE, UNSPECIFIED WHETHER RHEUMATOID FACTOR PRESENT (HCC): ICD-10-CM

## 2025-04-09 DIAGNOSIS — I10 ESSENTIAL HYPERTENSION: Primary | ICD-10-CM

## 2025-04-09 DIAGNOSIS — R73.03 PREDIABETES: ICD-10-CM

## 2025-04-09 DIAGNOSIS — Z00.00 MEDICARE ANNUAL WELLNESS VISIT, SUBSEQUENT: ICD-10-CM

## 2025-04-09 DIAGNOSIS — E78.2 MIXED HYPERLIPIDEMIA: ICD-10-CM

## 2025-04-09 DIAGNOSIS — Z71.89 ACP (ADVANCE CARE PLANNING): ICD-10-CM

## 2025-04-09 DIAGNOSIS — Z12.31 ENCOUNTER FOR SCREENING MAMMOGRAM FOR MALIGNANT NEOPLASM OF BREAST: ICD-10-CM

## 2025-04-09 DIAGNOSIS — J44.9 CHRONIC OBSTRUCTIVE PULMONARY DISEASE, UNSPECIFIED COPD TYPE (HCC): ICD-10-CM

## 2025-04-09 PROCEDURE — 99214 OFFICE O/P EST MOD 30 MIN: CPT | Performed by: INTERNAL MEDICINE

## 2025-04-09 PROCEDURE — 1090F PRES/ABSN URINE INCON ASSESS: CPT | Performed by: INTERNAL MEDICINE

## 2025-04-09 PROCEDURE — 4004F PT TOBACCO SCREEN RCVD TLK: CPT | Performed by: INTERNAL MEDICINE

## 2025-04-09 PROCEDURE — G8427 DOCREV CUR MEDS BY ELIG CLIN: HCPCS | Performed by: INTERNAL MEDICINE

## 2025-04-09 PROCEDURE — G8400 PT W/DXA NO RESULTS DOC: HCPCS | Performed by: INTERNAL MEDICINE

## 2025-04-09 PROCEDURE — 1125F AMNT PAIN NOTED PAIN PRSNT: CPT | Performed by: INTERNAL MEDICINE

## 2025-04-09 PROCEDURE — 3023F SPIROM DOC REV: CPT | Performed by: INTERNAL MEDICINE

## 2025-04-09 PROCEDURE — G0439 PPPS, SUBSEQ VISIT: HCPCS | Performed by: INTERNAL MEDICINE

## 2025-04-09 PROCEDURE — 99497 ADVNCD CARE PLAN 30 MIN: CPT | Performed by: INTERNAL MEDICINE

## 2025-04-09 PROCEDURE — 1160F RVW MEDS BY RX/DR IN RCRD: CPT | Performed by: INTERNAL MEDICINE

## 2025-04-09 PROCEDURE — 1159F MED LIST DOCD IN RCRD: CPT | Performed by: INTERNAL MEDICINE

## 2025-04-09 PROCEDURE — 3078F DIAST BP <80 MM HG: CPT | Performed by: INTERNAL MEDICINE

## 2025-04-09 PROCEDURE — 3077F SYST BP >= 140 MM HG: CPT | Performed by: INTERNAL MEDICINE

## 2025-04-09 PROCEDURE — 1123F ACP DISCUSS/DSCN MKR DOCD: CPT | Performed by: INTERNAL MEDICINE

## 2025-04-09 PROCEDURE — 3017F COLORECTAL CA SCREEN DOC REV: CPT | Performed by: INTERNAL MEDICINE

## 2025-04-09 PROCEDURE — G8417 CALC BMI ABV UP PARAM F/U: HCPCS | Performed by: INTERNAL MEDICINE

## 2025-04-09 RX ORDER — LOSARTAN POTASSIUM 50 MG/1
50 TABLET ORAL DAILY
Qty: 90 TABLET | Refills: 1 | Status: SHIPPED | OUTPATIENT
Start: 2025-04-09

## 2025-04-09 RX ORDER — POTASSIUM CHLORIDE 1500 MG/1
20 TABLET, EXTENDED RELEASE ORAL DAILY
Qty: 90 TABLET | Refills: 1 | Status: SHIPPED | OUTPATIENT
Start: 2025-04-09

## 2025-04-09 RX ORDER — FUROSEMIDE 20 MG/1
20 TABLET ORAL DAILY
Qty: 90 TABLET | Refills: 1 | Status: SHIPPED | OUTPATIENT
Start: 2025-04-09

## 2025-04-09 RX ORDER — MELOXICAM 7.5 MG/1
7.5 TABLET ORAL DAILY
Qty: 90 TABLET | Refills: 1 | Status: SHIPPED | OUTPATIENT
Start: 2025-04-09

## 2025-04-09 RX ORDER — LOVASTATIN 40 MG/1
40 TABLET ORAL NIGHTLY
Qty: 90 TABLET | Refills: 1 | Status: SHIPPED | OUTPATIENT
Start: 2025-04-09

## 2025-04-09 RX ORDER — TRIAMTERENE AND HYDROCHLOROTHIAZIDE 37.5; 25 MG/1; MG/1
1 TABLET ORAL DAILY
Qty: 90 TABLET | Refills: 1 | Status: SHIPPED | OUTPATIENT
Start: 2025-04-09

## 2025-04-09 RX ORDER — TIZANIDINE 2 MG/1
2 TABLET ORAL DAILY PRN
Qty: 90 TABLET | Refills: 0 | Status: SHIPPED | OUTPATIENT
Start: 2025-04-09

## 2025-04-09 RX ORDER — MONTELUKAST SODIUM 10 MG/1
10 TABLET ORAL DAILY
Qty: 90 TABLET | Refills: 1 | Status: SHIPPED | OUTPATIENT
Start: 2025-04-09

## 2025-04-09 ASSESSMENT — ENCOUNTER SYMPTOMS
GASTROINTESTINAL NEGATIVE: 1
RESPIRATORY NEGATIVE: 1
EYES NEGATIVE: 1

## 2025-04-09 NOTE — PROGRESS NOTES
Chief Complaint   Patient presents with    Medicare AWV    Irritable Bowel Syndrome    Cholesterol Problem    Generalized psoriasis    Asthma     \"Have you been to the ER, urgent care clinic since your last visit?  Hospitalized since your last visit?\"    NO    “Have you seen or consulted any other health care providers outside our system since your last visit?”    NO           
TAKE 1 PUFF BY INHALATION EVERY SIX (6) HOURS. Yes Automatic Reconciliation, Ar   folic acid (FOLVITE) 1 MG tablet Take 1 tablet by mouth  Provider, MD Kat Whiteside (Including outside providers/suppliers regularly involved in providing care):   Patient Care Team:  Angy Campos MD as PCP - General (Internal Medicine)  Beau Roberson DO as PCP - Empaneled Provider     Recommendations for Preventive Services Due: see orders and patient instructions/AVS.  Recommended screening schedule for the next 5-10 years is provided to the patient in written form: see Patient Instructions/AVS.     Reviewed and updated this visit:  Allergies  Meds  Sexual Hx                  The patient (or guardian, if applicable) and other individuals in attendance with the patient were advised that Artificial Intelligence will be utilized during this visit to record and process the conversation to generate a clinical note. The patient (or guardian, if applicable) and other individuals in attendance at the appointment consented to the use of AI, including the recording.

## 2025-04-09 NOTE — PATIENT INSTRUCTIONS
Preventive Plan for Nadia Solitario - 4/9/2025  Medicare offers a range of preventive health benefits. Some of the tests and screenings are paid in full while other may be subject to a deductible, co-insurance, and/or copay.  Some of these benefits include a comprehensive review of your medical history including lifestyle, illnesses that may run in your family, and various assessments and screenings as appropriate.  After reviewing your medical record and screening and assessments performed today your provider may have ordered immunizations, labs, imaging, and/or referrals for you.  A list of these orders (if applicable) as well as your Preventive Care list are included within your After Visit Summary for your review.

## 2025-04-09 NOTE — ACP (ADVANCE CARE PLANNING)

## (undated) DEVICE — TUBING HYDR IRR --

## (undated) DEVICE — Device: Brand: SINGLE USE INJECTOR NM600/610

## (undated) DEVICE — FCPS BX HOT RJ4 2.2MMX240CM -- RADIAL JAW 4 BX/40

## (undated) DEVICE — GEL SUBMUCOSAL LIFT AGNT -- ORISE

## (undated) DEVICE — PAD GROUNDING UNIVERSAL SPLIT UNCORDED

## (undated) DEVICE — SNARE ENDOSCP L240CM LOOP W27MM SHTH DIA2.4MM WRK CHN 2.8MM